# Patient Record
Sex: FEMALE | Race: WHITE | Employment: FULL TIME | URBAN - METROPOLITAN AREA
[De-identification: names, ages, dates, MRNs, and addresses within clinical notes are randomized per-mention and may not be internally consistent; named-entity substitution may affect disease eponyms.]

---

## 2017-06-27 PROBLEM — E78.2 MIXED HYPERLIPIDEMIA: Status: ACTIVE | Noted: 2017-06-27

## 2017-06-27 PROBLEM — G43.001 MIGRAINE WITHOUT AURA AND WITH STATUS MIGRAINOSUS, NOT INTRACTABLE: Status: ACTIVE | Noted: 2017-06-27

## 2017-06-27 PROBLEM — M19.90 ARTHRITIS: Status: ACTIVE | Noted: 2017-06-27

## 2017-06-27 PROBLEM — E03.9 ACQUIRED HYPOTHYROIDISM: Status: ACTIVE | Noted: 2017-06-27

## 2017-06-27 PROBLEM — M79.89 SWELLING OF BOTH HANDS: Status: ACTIVE | Noted: 2017-06-27

## 2017-07-06 ENCOUNTER — HOSPITAL ENCOUNTER (OUTPATIENT)
Dept: GENERAL RADIOLOGY | Age: 52
Discharge: HOME | End: 2017-07-06
Payer: COMMERCIAL

## 2017-07-06 DIAGNOSIS — M79.89 SWELLING OF BOTH HANDS: ICD-10-CM

## 2017-07-06 DIAGNOSIS — M19.90 ARTHRITIS: ICD-10-CM

## 2017-07-06 PROCEDURE — 73120 X-RAY EXAM OF HAND: CPT

## 2017-07-06 NOTE — PROGRESS NOTES
Please let her know that her XR does not show any erosions, just some minor changes from osteoarthritis. At this point we can say this is not rheumatoid or autoimmune arthritis we are dealing with. I would like her to get back with me so we can discuss ways to treat this for her. Thanks.

## 2017-09-14 PROBLEM — D17.1 LIPOMA OF TORSO: Status: ACTIVE | Noted: 2017-09-14

## 2017-09-14 PROBLEM — M19.041 OSTEOARTHRITIS OF BOTH HANDS: Status: ACTIVE | Noted: 2017-06-27

## 2017-09-14 PROBLEM — M19.042 OSTEOARTHRITIS OF BOTH HANDS: Status: ACTIVE | Noted: 2017-06-27

## 2017-11-15 ENCOUNTER — HOSPITAL ENCOUNTER (OUTPATIENT)
Dept: SURGERY | Age: 52
Discharge: HOME | End: 2017-11-15

## 2017-11-15 NOTE — PERIOP NOTES
Patient did not show for 0815 PAT appointment. Message left with Dr. Giselle Whittaker Showers nurse, informing that patient did not show for appointment and will need to be rescheduled.

## 2018-01-02 PROBLEM — F33.41 MDD (MAJOR DEPRESSIVE DISORDER), RECURRENT, IN PARTIAL REMISSION (HCC): Status: ACTIVE | Noted: 2018-01-02

## 2018-01-02 PROBLEM — Z23 ENCOUNTER FOR IMMUNIZATION: Status: ACTIVE | Noted: 2018-01-02

## 2018-01-02 PROBLEM — R68.84 JAW PAIN: Status: ACTIVE | Noted: 2018-01-02

## 2018-01-02 PROBLEM — K21.9 GASTROESOPHAGEAL REFLUX DISEASE WITHOUT ESOPHAGITIS: Status: ACTIVE | Noted: 2018-01-02

## 2018-01-02 PROBLEM — F17.200 TOBACCO DEPENDENCE: Status: ACTIVE | Noted: 2018-01-02

## 2018-01-03 ENCOUNTER — HOSPITAL ENCOUNTER (OUTPATIENT)
Dept: SURGERY | Age: 53
Discharge: HOME | End: 2018-01-03
Attending: SURGERY

## 2018-01-03 VITALS
SYSTOLIC BLOOD PRESSURE: 117 MMHG | HEART RATE: 72 BPM | BODY MASS INDEX: 19.15 KG/M2 | TEMPERATURE: 97.2 F | RESPIRATION RATE: 18 BRPM | HEIGHT: 67 IN | DIASTOLIC BLOOD PRESSURE: 72 MMHG | OXYGEN SATURATION: 100 % | WEIGHT: 122 LBS

## 2018-01-03 RX ORDER — BISMUTH SUBSALICYLATE 262 MG
1 TABLET,CHEWABLE ORAL DAILY
COMMUNITY
End: 2018-04-18

## 2018-01-03 RX ORDER — ZINC GLUCONATE 10 MG
LOZENGE ORAL 2 TIMES DAILY
COMMUNITY
End: 2018-04-18

## 2018-01-03 RX ORDER — LANOLIN ALCOHOL/MO/W.PET/CERES
1000 CREAM (GRAM) TOPICAL DAILY
COMMUNITY
End: 2018-04-18

## 2018-01-03 RX ORDER — IBUPROFEN 200 MG
TABLET ORAL
COMMUNITY
End: 2018-04-18

## 2018-01-03 NOTE — PERIOP NOTES
Patient verified name, , and surgery as listed in [facility]. Type 2 surgery, PAT walk-in assessment complete. Labs per surgeon: No orders received. Orders will be put in [facility] by surgeon. Labs per anesthesia protocol: None per anesthesia protocol and none per Dr. Mario April protocol. EKG: None per anesthesia protocol. Patient states hx of heart murmur. Per Dr. Michelle Bruno note (3/23/17) \"Murmurs: no. Rhythm: regular. Heart sounds: normal, no gallops. \" Per Dr. Saintclair Deep note (17) \"Cardiovascular: Normal rate, regular rhythm and normal heart sounds. Exam reveals no gallop and no friction rub. No murmur heard. \"  No murmur auscultated at PAT appointment by this nurse or by Chivo Edge RN. Patient states last echo was over 10 years ago. Hibiclens and instructions given per hospital policy. Patient provided with and instructed on educational handouts including Guide to Surgery, Pain Management, Hand Hygiene, Blood Transfusion Education, and [facility] Brochure. Patient answered medical/surgical history questions at their best of ability. All prior to admission medications documented in [facility]. Original medication prescription bottle NOT visualized during patient appointment. Patient instructed to hold all vitamins 7 days prior to surgery and NSAIDS 5 days prior to surgery, patient verbalized understanding. Medications to be held: All vitamins/supplements, Excedrin, and Ibuprofen. Patient instructed to continue previous medications as prescribed prior to surgery and to take the following medications the day of surgery according to anesthesia guidelines with a small sip of water: Claritin, Nexium, Citalopram, and Levothyroxine. Patient teach back successful and patient demonstrates knowledge of instructions.

## 2018-01-08 ENCOUNTER — ANESTHESIA EVENT (OUTPATIENT)
Dept: SURGERY | Age: 53
End: 2018-01-08
Payer: COMMERCIAL

## 2018-01-09 ENCOUNTER — HOSPITAL ENCOUNTER (OUTPATIENT)
Age: 53
Setting detail: OUTPATIENT SURGERY
Discharge: HOME | End: 2018-01-09
Attending: SURGERY | Admitting: SURGERY
Payer: COMMERCIAL

## 2018-01-09 ENCOUNTER — ANESTHESIA (OUTPATIENT)
Dept: SURGERY | Age: 53
End: 2018-01-09
Payer: COMMERCIAL

## 2018-01-09 VITALS
HEART RATE: 92 BPM | BODY MASS INDEX: 19.15 KG/M2 | WEIGHT: 122 LBS | RESPIRATION RATE: 16 BRPM | SYSTOLIC BLOOD PRESSURE: 105 MMHG | TEMPERATURE: 97.9 F | HEIGHT: 67 IN | DIASTOLIC BLOOD PRESSURE: 54 MMHG | OXYGEN SATURATION: 93 %

## 2018-01-09 DIAGNOSIS — D17.1 LIPOMA OF TORSO: Primary | ICD-10-CM

## 2018-01-09 PROCEDURE — 76210000017 HC OR PH I REC 1.5 TO 2 HR: Performed by: SURGERY

## 2018-01-09 PROCEDURE — 77030008522 HC TBNG INSUF LAPRO STRY -B: Performed by: SURGERY

## 2018-01-09 PROCEDURE — 88312 SPECIAL STAINS GROUP 1: CPT | Performed by: SURGERY

## 2018-01-09 PROCEDURE — 77030021678 HC GLIDESCP STAT DISP VERT -B: Performed by: ANESTHESIOLOGY

## 2018-01-09 PROCEDURE — 77030031139 HC SUT VCRL2 J&J -A: Performed by: SURGERY

## 2018-01-09 PROCEDURE — 77030008477 HC STYL SATN SLP COVD -A: Performed by: ANESTHESIOLOGY

## 2018-01-09 PROCEDURE — 77030034154 HC SHR COAG HARM ACE J&J -F: Performed by: SURGERY

## 2018-01-09 PROCEDURE — 74011000250 HC RX REV CODE- 250

## 2018-01-09 PROCEDURE — 77030008703 HC TU ET UNCUF COVD -A: Performed by: ANESTHESIOLOGY

## 2018-01-09 PROCEDURE — 88307 TISSUE EXAM BY PATHOLOGIST: CPT | Performed by: SURGERY

## 2018-01-09 PROCEDURE — 77030018836 HC SOL IRR NACL ICUM -A: Performed by: SURGERY

## 2018-01-09 PROCEDURE — 77030011640 HC PAD GRND REM COVD -A: Performed by: SURGERY

## 2018-01-09 PROCEDURE — 74011250636 HC RX REV CODE- 250/636: Performed by: ANESTHESIOLOGY

## 2018-01-09 PROCEDURE — 76010000149 HC OR TIME 1 TO 1.5 HR: Performed by: SURGERY

## 2018-01-09 PROCEDURE — 77030003029 HC SUT VCRL J&J -B: Performed by: SURGERY

## 2018-01-09 PROCEDURE — 77030020782 HC GWN BAIR PAWS FLX 3M -B: Performed by: ANESTHESIOLOGY

## 2018-01-09 PROCEDURE — 77030002916 HC SUT ETHLN J&J -A: Performed by: SURGERY

## 2018-01-09 PROCEDURE — 76060000033 HC ANESTHESIA 1 TO 1.5 HR: Performed by: SURGERY

## 2018-01-09 PROCEDURE — 88304 TISSUE EXAM BY PATHOLOGIST: CPT | Performed by: SURGERY

## 2018-01-09 PROCEDURE — 77030032490 HC SLV COMPR SCD KNE COVD -B: Performed by: SURGERY

## 2018-01-09 PROCEDURE — 77030002982 HC SUT POLYSRB J&J -A: Performed by: SURGERY

## 2018-01-09 PROCEDURE — 77030037892: Performed by: SURGERY

## 2018-01-09 PROCEDURE — 88313 SPECIAL STAINS GROUP 2: CPT | Performed by: SURGERY

## 2018-01-09 PROCEDURE — 77030010513 HC APPL CLP LIG J&J -C: Performed by: SURGERY

## 2018-01-09 PROCEDURE — 74011250636 HC RX REV CODE- 250/636

## 2018-01-09 PROCEDURE — 74011000250 HC RX REV CODE- 250: Performed by: SURGERY

## 2018-01-09 RX ORDER — BUPIVACAINE HYDROCHLORIDE 2.5 MG/ML
INJECTION, SOLUTION EPIDURAL; INFILTRATION; INTRACAUDAL AS NEEDED
Status: DISCONTINUED | OUTPATIENT
Start: 2018-01-09 | End: 2018-01-09 | Stop reason: HOSPADM

## 2018-01-09 RX ORDER — DEXAMETHASONE SODIUM PHOSPHATE 4 MG/ML
INJECTION, SOLUTION INTRA-ARTICULAR; INTRALESIONAL; INTRAMUSCULAR; INTRAVENOUS; SOFT TISSUE AS NEEDED
Status: DISCONTINUED | OUTPATIENT
Start: 2018-01-09 | End: 2018-01-09 | Stop reason: HOSPADM

## 2018-01-09 RX ORDER — GLYCOPYRROLATE 0.2 MG/ML
INJECTION INTRAMUSCULAR; INTRAVENOUS AS NEEDED
Status: DISCONTINUED | OUTPATIENT
Start: 2018-01-09 | End: 2018-01-09 | Stop reason: HOSPADM

## 2018-01-09 RX ORDER — ONDANSETRON 2 MG/ML
INJECTION INTRAMUSCULAR; INTRAVENOUS AS NEEDED
Status: DISCONTINUED | OUTPATIENT
Start: 2018-01-09 | End: 2018-01-09 | Stop reason: HOSPADM

## 2018-01-09 RX ORDER — SODIUM CHLORIDE 0.9 % (FLUSH) 0.9 %
5-10 SYRINGE (ML) INJECTION AS NEEDED
Status: DISCONTINUED | OUTPATIENT
Start: 2018-01-09 | End: 2018-01-09 | Stop reason: HOSPADM

## 2018-01-09 RX ORDER — MIDAZOLAM HYDROCHLORIDE 1 MG/ML
2 INJECTION, SOLUTION INTRAMUSCULAR; INTRAVENOUS
Status: DISCONTINUED | OUTPATIENT
Start: 2018-01-09 | End: 2018-01-09 | Stop reason: HOSPADM

## 2018-01-09 RX ORDER — LIDOCAINE HYDROCHLORIDE 20 MG/ML
INJECTION, SOLUTION EPIDURAL; INFILTRATION; INTRACAUDAL; PERINEURAL AS NEEDED
Status: DISCONTINUED | OUTPATIENT
Start: 2018-01-09 | End: 2018-01-09 | Stop reason: HOSPADM

## 2018-01-09 RX ORDER — PROPOFOL 10 MG/ML
INJECTION, EMULSION INTRAVENOUS AS NEEDED
Status: DISCONTINUED | OUTPATIENT
Start: 2018-01-09 | End: 2018-01-09 | Stop reason: HOSPADM

## 2018-01-09 RX ORDER — SODIUM CHLORIDE 9 MG/ML
25 INJECTION, SOLUTION INTRAVENOUS CONTINUOUS
Status: DISCONTINUED | OUTPATIENT
Start: 2018-01-09 | End: 2018-01-09 | Stop reason: HOSPADM

## 2018-01-09 RX ORDER — HYDROCODONE BITARTRATE AND ACETAMINOPHEN 5; 325 MG/1; MG/1
TABLET ORAL
Qty: 40 TAB | Refills: 0 | Status: SHIPPED
Start: 2018-01-09 | End: 2018-04-18

## 2018-01-09 RX ORDER — FAMOTIDINE 20 MG/1
20 TABLET, FILM COATED ORAL ONCE
Status: DISCONTINUED | OUTPATIENT
Start: 2018-01-09 | End: 2018-01-09 | Stop reason: HOSPADM

## 2018-01-09 RX ORDER — NEOSTIGMINE METHYLSULFATE 1 MG/ML
INJECTION INTRAVENOUS AS NEEDED
Status: DISCONTINUED | OUTPATIENT
Start: 2018-01-09 | End: 2018-01-09 | Stop reason: HOSPADM

## 2018-01-09 RX ORDER — SODIUM CHLORIDE, SODIUM LACTATE, POTASSIUM CHLORIDE, CALCIUM CHLORIDE 600; 310; 30; 20 MG/100ML; MG/100ML; MG/100ML; MG/100ML
100 INJECTION, SOLUTION INTRAVENOUS CONTINUOUS
Status: DISCONTINUED | OUTPATIENT
Start: 2018-01-09 | End: 2018-01-09 | Stop reason: HOSPADM

## 2018-01-09 RX ORDER — OXYCODONE HYDROCHLORIDE 5 MG/1
5 TABLET ORAL
Status: DISCONTINUED | OUTPATIENT
Start: 2018-01-09 | End: 2018-01-09 | Stop reason: HOSPADM

## 2018-01-09 RX ORDER — ROCURONIUM BROMIDE 10 MG/ML
INJECTION, SOLUTION INTRAVENOUS AS NEEDED
Status: DISCONTINUED | OUTPATIENT
Start: 2018-01-09 | End: 2018-01-09 | Stop reason: HOSPADM

## 2018-01-09 RX ORDER — NALOXONE HYDROCHLORIDE 0.4 MG/ML
0.1 INJECTION, SOLUTION INTRAMUSCULAR; INTRAVENOUS; SUBCUTANEOUS AS NEEDED
Status: DISCONTINUED | OUTPATIENT
Start: 2018-01-09 | End: 2018-01-09 | Stop reason: HOSPADM

## 2018-01-09 RX ORDER — HYDROMORPHONE HYDROCHLORIDE 2 MG/ML
0.5 INJECTION, SOLUTION INTRAMUSCULAR; INTRAVENOUS; SUBCUTANEOUS
Status: COMPLETED | OUTPATIENT
Start: 2018-01-09 | End: 2018-01-09

## 2018-01-09 RX ORDER — HYDROCODONE BITARTRATE AND ACETAMINOPHEN 7.5; 325 MG/1; MG/1
1 TABLET ORAL AS NEEDED
Status: DISCONTINUED | OUTPATIENT
Start: 2018-01-09 | End: 2018-01-09 | Stop reason: HOSPADM

## 2018-01-09 RX ORDER — ONDANSETRON 8 MG/1
8 TABLET, ORALLY DISINTEGRATING ORAL
Qty: 10 TAB | Refills: 1 | Status: SHIPPED
Start: 2018-01-09 | End: 2018-04-18

## 2018-01-09 RX ORDER — FENTANYL CITRATE 50 UG/ML
100 INJECTION, SOLUTION INTRAMUSCULAR; INTRAVENOUS ONCE
Status: DISCONTINUED | OUTPATIENT
Start: 2018-01-09 | End: 2018-01-09 | Stop reason: HOSPADM

## 2018-01-09 RX ORDER — SODIUM CHLORIDE 0.9 % (FLUSH) 0.9 %
5-10 SYRINGE (ML) INJECTION EVERY 8 HOURS
Status: DISCONTINUED | OUTPATIENT
Start: 2018-01-09 | End: 2018-01-09 | Stop reason: HOSPADM

## 2018-01-09 RX ORDER — LIDOCAINE HYDROCHLORIDE 10 MG/ML
0.1 INJECTION INFILTRATION; PERINEURAL AS NEEDED
Status: DISCONTINUED | OUTPATIENT
Start: 2018-01-09 | End: 2018-01-09 | Stop reason: HOSPADM

## 2018-01-09 RX ORDER — ACETAMINOPHEN 10 MG/ML
1000 INJECTION, SOLUTION INTRAVENOUS ONCE
Status: COMPLETED | OUTPATIENT
Start: 2018-01-09 | End: 2018-01-09

## 2018-01-09 RX ADMIN — HYDROMORPHONE HYDROCHLORIDE 0.5 MG: 2 INJECTION, SOLUTION INTRAMUSCULAR; INTRAVENOUS; SUBCUTANEOUS at 17:42

## 2018-01-09 RX ADMIN — MIDAZOLAM HYDROCHLORIDE 2 MG: 1 INJECTION, SOLUTION INTRAMUSCULAR; INTRAVENOUS at 15:48

## 2018-01-09 RX ADMIN — GLYCOPYRROLATE 0.2 MG: 0.2 INJECTION INTRAMUSCULAR; INTRAVENOUS at 17:03

## 2018-01-09 RX ADMIN — NEOSTIGMINE METHYLSULFATE 2 MG: 1 INJECTION INTRAVENOUS at 17:03

## 2018-01-09 RX ADMIN — ROCURONIUM BROMIDE 35 MG: 10 INJECTION, SOLUTION INTRAVENOUS at 16:07

## 2018-01-09 RX ADMIN — DEXAMETHASONE SODIUM PHOSPHATE 5 MG: 4 INJECTION, SOLUTION INTRA-ARTICULAR; INTRALESIONAL; INTRAMUSCULAR; INTRAVENOUS; SOFT TISSUE at 16:16

## 2018-01-09 RX ADMIN — HYDROMORPHONE HYDROCHLORIDE 0.5 MG: 2 INJECTION, SOLUTION INTRAMUSCULAR; INTRAVENOUS; SUBCUTANEOUS at 17:47

## 2018-01-09 RX ADMIN — PROPOFOL 200 MG: 10 INJECTION, EMULSION INTRAVENOUS at 16:07

## 2018-01-09 RX ADMIN — SODIUM CHLORIDE, SODIUM LACTATE, POTASSIUM CHLORIDE, AND CALCIUM CHLORIDE: 600; 310; 30; 20 INJECTION, SOLUTION INTRAVENOUS at 17:02

## 2018-01-09 RX ADMIN — ONDANSETRON 4 MG: 2 INJECTION INTRAMUSCULAR; INTRAVENOUS at 16:16

## 2018-01-09 RX ADMIN — HYDROMORPHONE HYDROCHLORIDE 0.5 MG: 2 INJECTION, SOLUTION INTRAMUSCULAR; INTRAVENOUS; SUBCUTANEOUS at 17:25

## 2018-01-09 RX ADMIN — SODIUM CHLORIDE, SODIUM LACTATE, POTASSIUM CHLORIDE, AND CALCIUM CHLORIDE 100 ML/HR: 600; 310; 30; 20 INJECTION, SOLUTION INTRAVENOUS at 14:47

## 2018-01-09 RX ADMIN — HYDROMORPHONE HYDROCHLORIDE 0.5 MG: 2 INJECTION, SOLUTION INTRAMUSCULAR; INTRAVENOUS; SUBCUTANEOUS at 17:30

## 2018-01-09 RX ADMIN — LIDOCAINE HYDROCHLORIDE 100 MG: 20 INJECTION, SOLUTION EPIDURAL; INFILTRATION; INTRACAUDAL; PERINEURAL at 16:07

## 2018-01-09 RX ADMIN — ACETAMINOPHEN 1000 MG: 10 INJECTION, SOLUTION INTRAVENOUS at 18:15

## 2018-01-09 NOTE — OP NOTES
Operative Report    Patient: Vahe Berry MRN: [id number]      YOB: 1965  Age: 45 y.o. Sex: female       Date of Surgery: 1/9/2018     Preoperative Diagnosis: Biliary dyskinesia [K82.8]  Lipoma of back [D17.1]     Postoperative Diagnosis: Biliary dyskinesia [K82.8]  Lipoma of back [D17.1],  Non-specific liver abnormality     Procedure:  Laparoscopic Cholecystectomy -Single Incision/SILS  Laparoscopic wedge liver biopsy  Excision subcutaneous mass right flank, 6cm    Anesthesia: General   Complications: none  Estimated Blood Loss: per anesthesia    Indications:  As outlined in the History and Physical.  Single incision technique is planned to provide the patient with the benefit of less incisional pain and improved cosmesis due to fewer incisions as well as the potential for lower risk of wound infection. This technique is significantly more intricate than standard laparoscopic techniques and typically increases the complexity of the procedure by 10-20%. Procedure in Detail:   Informed consent was obtained and the patient was brought to the operating room and placed on the table in supine position with adequate padding of all pressure points and compression devices on both lower extremities. After the successful induction of general anesthesia, the patients abdomen was prepped and draped sterilely. Under direct laparoscopic visualization and additional local anesthetic, a 5mm optiview-type Trocar was inserted through a curved infraumbilical  incision and pneumoperitoneum was created. Visual exploration revealed no immediately apparent pathology other than what appeared to be focal thickening and/or fibrosis of the capsule of the undersurface of the liver lateral to and just medial to the gallbladder. An additional 5mm Trocar was placed through the incision  and a [place] grasper was inserted alongside that trocar sleeve.  The visibly normal gallbladder was grasped by its neck and retracted anterolaterally. The tissues investing this area were incised and blunt dissection was used to skeletonize the normal caliber cystic duct for approximately 10mm. A generous window was created behind the duct allowing identification of the cystic artery, which was traced to the gallbladder wall and divided with the harmonic scalpel. This permitted  generation of a large window behind the neck of the gallbladder allowing a near 360 degree view of the region to confirm normal anatomy of the origin of the cystic duct. A clip was then placed across the neck of the gallbladder and two on the duct 0.5 cm medially and it was transected. The gallbladder was excised with the harmonic. The liver was noted to demonstrate probable capsular fibrosis as noted above, but the plaque-like areas warranted biopsy. The harmonic scalpel was used to excise a roughly 1 cubic cm wedge from the right lateral aspect at one of the areas of thickening. The site was confirmed to be hemostatic with the harmonic scalpel. After confirming hemostasis of the liver bed, the liver biopsy and gallbladder were placed in a pouch which was extracted through the umbilical site. Pneumoperitoneum was released and the trocars were removed. The fascia was closed with a figure-of-eight 0-Vicryl,  then the incision was  made hemostatic with cautery and closed with subcuticular 4-0 Vicryl and Steri-Strips were applied. The right flank subcutaneous mass was then excised using blunt and cautery dissection. This revealed a 6cm transverse lipomatous mass. The area was infiltrated with local and made hemostatic with cautery. It was closed with layered 4-0 interrupted vicryl. Steri strips were placed. The patient tolerated the procedure well. There were no immediate apparent complications. She was awakened from anesthesia and extubated in the operating room, taken to recovery in satisfactory condition.            Specimens:   ID Type Source Tests Collected by Time Destination   1 : Gallbladder and contents Preservative Gallbladder  Jennifer Alonzo MD 1/9/2018 1628 Pathology   3 : liver biopsy Preservative   Jennifer Alonzo MD 1/9/2018 1639 Pathology   1 : right flank lipoma Tissue   Jennifer Alonzo MD 1/9/2018 1703 Discarded           Counts: Sponge and needle counts were correct.     Signed By:  Jennifer Alonzo MD     January 9, 2018

## 2018-01-09 NOTE — ANESTHESIA PREPROCEDURE EVALUATION
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          Physical Exam    Airway  Mallampati: III  TM Distance: 4 - 6 cm  Neck ROM: normal range of motion   Mouth opening: Normal     Cardiovascular  Regular rate and rhythm,  S1 and S2 normal,  no murmur, click, rub, or gallop             Dental  No notable dental hx       Pulmonary                 Abdominal  GI exam deferred       Other Findings            Anesthetic Plan    ASA: 2  Anesthesia type: general          Induction: Intravenous  Anesthetic plan and risks discussed with: Patient

## 2018-01-09 NOTE — DISCHARGE INSTRUCTIONS
Jaimie Mendoza M.D.  [phone]    Instructions following Laparoscopic Cholecystectomy:    ACTIVITY:   Try to take a few short walks with help around the house later today. It is very important to take short walks to avoid blood clots and pneumonia.  You may be light-headed or sleepy from anesthesia, so be careful going up and down stairs.  Avoid any activity that may be dangerous or involves heavy objects for 24-48 hours. DIET:   Drink only clear, non-carbonated liquids when you get home (sugar-free if you are diabetic), such as Gatorade, chicken broth, etc.   Tomorrow start soft foods such as grits and eggs, mashed potatoes, yogurt, cottage cheese, etc. Remain on soft foods for at least 24-36 hours then you may eat whatever foods you wish.  Many people experience nausea for a day or so after surgery, and many people have loose stools or diarrhea immediately after gallbladder surgery. It is also not uncommon to not have a bowel movement for 2-3 days. PAIN:   You will be given a prescription for pain medication and nausea.  Try to take the pain medication with food, even a few crackers.  You may also use Tylenol, Motrin, Advil, or Aleve instead of the prescription pain medication. Do no take Tylenol and the prescription pain medication within 4 hours of each other.  URINARY RETENTION: If you are unable to empty your bladder within 6 hours after returning home, please go to your nearest Emergency Department or Urgent Care for urinary catheterization. WOUND CARE:   You may shower the day after surgery   It is not uncommon for the incisions to ooze or drain blood-tinged fluid. Cover the area with gauze if the drainage continues.  Incisions will sometimes develop redness around them, up to the size of a quarter, as well as a hard lumpy feel. If this redness continues to get larger, please call the office.     PLEASE NOTE: it is not uncommon to have pain and a \"knot\" to the left of the belly button. CALL THE DOCTOR IF:   You have a temperature higher than 101.5° Fahrenheit for more than 6 hours.  You have severe nausea or vomiting not relieved by medication; or diarrhea.  You develop increasing redness or infection at the incision. Continue home medications as previously prescribed.

## 2018-01-09 NOTE — ANESTHESIA POSTPROCEDURE EVALUATION
Post-Anesthesia Evaluation and Assessment    Patient: Julio Saavedra MRN: [id number]  SSN: [id number]    YOB: 1965  Age: 45 y.o. Sex: female       Cardiovascular Function/Vital Signs  Visit Vitals    /54    Pulse 92    Temp 36.6 °C (97.9 °F)    Resp 16    Ht 5' 7\" (1.702 m)    Wt 55.3 kg (122 lb)    SpO2 93%    BMI 19.11 kg/m2       Patient is status post general anesthesia for Procedure(s):  CHOLECYSTECTOMY LAPAROSCOPIC SINGLE INCISION (SILS)  EXCISION OF RIGHT BACK LIPOMA  LIVER BIOPSY. Nausea/Vomiting: None    Postoperative hydration reviewed and adequate. Pain:  Pain Scale 1: Visual (01/09/18 1800)  Pain Intensity 1: 3 (01/09/18 1800)   Managed    Neurological Status:   Neuro (WDL): Exceptions to WDL (01/09/18 1800)  Neuro  Neurologic State: Drowsy (01/09/18 1800)  Cognition: Follows commands (01/09/18 1800)  LUE Motor Response: Purposeful (01/09/18 1800)  LLE Motor Response: Purposeful (01/09/18 1800)  RUE Motor Response: Purposeful (01/09/18 1800)  RLE Motor Response: Purposeful (01/09/18 1800)   At baseline    Mental Status and Level of Consciousness: Arousable    Pulmonary Status:   O2 Device: Room air (01/09/18 1815)   Adequate oxygenation and airway patent    Complications related to anesthesia: None    Post-anesthesia assessment completed.  No concerns    Signed By: Mark Borges MD     January 9, 2018

## 2018-01-09 NOTE — H&P
Primary/Requesting provider: Cassy Avila MD          Chief Complaint   Patient presents with    Skin Problem       lipoma                  HISTORY OF PRESENT ILLNESS  Kirsten Hines is a 45 y.o. female. HPI  Patient is a 45 y.o. female who presents for consideration of mass removal.  She has a recently enlarging subcutaneous mass of her right flank which has been present for at least 10 years. With recent enlargement she is experiencing occasional pains at the site but also pain when she lays on her right side- \"feels like I'm laying on a Lego piece. \"  She has not had any erythema or bruising at the site. The mass remains mobile.     She then asked if she could discuss her gallbladder. She has had symptoms for at least 10 years including tightness in a band-like distribution across her upper abdomen with nausea and bloating which can last upwards of 36 hours. This seems to be post-prandial and she mentions fried foods and avacados as examples of foods she is trying to avoid. Even just eating bland soup/sandwich at lunch she typically has some degree of symptoms at least once a week. She was evaluated many years ago- recalls HIDA- but never had surgery. She does not report vomiting, fever, chills, jaundice. She does have occasional diarrheal stool but cannot confidently tie it to the other symptoms or to any specific food trigger. She believes her HIDA result was 25% [facility]) and she distinctly remembers having significant pain reproduction with the injection. She has reached a point of intolerance to her symptoms.     Medications:        Current Outpatient Prescriptions   Medication Sig    atorvastatin (LIPITOR) 10 mg tablet Take 1 Tab by mouth daily.  rizatriptan (MAXALT) 5 mg tablet Take 1 Tab by mouth once as needed for Migraine for up to 1 dose.  levothyroxine (SYNTHROID) 50 mcg tablet TAKE 1 TABLET (50 MCG) BY MOUTH DAILY.     butalbital-acetaminophen-caff (FIORICET) -40 mg per capsule Take 1 Cap by mouth every four (4) hours as needed for Pain. Max Daily Amount: 6 Caps.  citalopram (CELEXA) 20 mg tablet Take 1 Tab by mouth daily.  estradiol (ESTRACE) 2 mg tablet Take 1 Tab by mouth daily.  medroxyPROGESTERone (PROVERA) 5 mg tablet Take 1 Tab by mouth daily.  ondansetron hcl (ZOFRAN) 4 mg tablet Take 4 mg by mouth.  oxyCODONE (OXYIR) 5 mg capsule Take 1 Cap by mouth every four (4) hours as needed. Max Daily Amount: 30 mg.    esomeprazole (NEXIUM) 20 mg capsule Take 40 mg by mouth daily.  loratadine (CLARITIN) 10 mg tablet Take 10 mg by mouth daily.      No current facility-administered medications for this visit.          Allergies: Allergies   Allergen Reactions    Uribel [Methen-M. Blue-S. Phos-Phsal-Hyo] Anaphylaxis    Bactrim [Sulfamethoprim Ds] Unknown (comments)    Diflucan [Fluconazole] Unknown (comments)    Penicillins Unknown (comments)    Zyrtec [Cetirizine] Other (comments)       Stomach cramps vaginal bleeding         Past History:       Past Medical History:   Diagnosis Date    Depression      GERD (gastroesophageal reflux disease)      History of chicken pox      History of migraine headaches      History of TMJ disorder      Hypercholesterolemia      Measles      Mumps      Thyroid disease              Past Surgical History:   Procedure Laterality Date    HX BREAST AUGMENTATION        HX COLONOSCOPY   2016    HX GYN         endo ablation and essure tubal    HX GYN   1989 dysplasia     2 cone biopsy           Family and Social History:        Family History   Problem Relation Age of Onset    Hypertension Mother      Hypertension Maternal Grandmother      Ovarian Cancer Neg Hx      Breast Cancer Neg Hx      Colon Cancer Neg Hx        Social History            Social History    Marital status:        Spouse name: N/A    Number of children: N/A    Years of education: N/A          Occupational History    Not on file.     Social History Main Topics    Smoking status: Former Smoker    Smokeless tobacco: Never Used    Alcohol use No    Drug use: No    Sexual activity: Yes       Partners: Male       Birth control/ protection: Surgical           Other Topics Concern    Not on file      Social History Narrative       Review of Systems   Respiratory: Negative for cough, hemoptysis, sputum production and shortness of breath. Cardiovascular: Negative. Negative for chest pain, palpitations, orthopnea, claudication, leg swelling and PND. Gastrointestinal: Negative for abdominal pain, blood in stool, constipation, diarrhea, heartburn, nausea and vomiting. Skin:        Lipoma on the torso of the right side. Has been present for 10-12 years per patient. It has grown in size and is becoming unconformable. Neurological: Negative for headaches. Psychiatric/Behavioral: Negative. Negative for depression, hallucinations, substance abuse and suicidal ideas. The patient is not nervous/anxious.          Physical Exam   Constitutional: She appears well-developed and well-nourished. She is cooperative. Non-toxic appearance. HENT:   Head: Normocephalic and atraumatic. Mouth/Throat: Oropharynx is clear and moist.   Eyes: Conjunctivae and EOM are normal. Pupils are equal, round, and reactive to light. No scleral icterus. Neck: Normal range of motion. No JVD present. No tracheal deviation present. No thyromegaly present. Cardiovascular: Normal rate and regular rhythm. Exam reveals no gallop and no friction rub. No murmur heard. Pulmonary/Chest: Effort normal and breath sounds normal. No respiratory distress. She has no wheezes. She has no rales. Abdominal: Soft. Bowel sounds are normal. She exhibits no distension. There is tenderness (RUQ>epigastrum. increased with inspiration, but no true Mead's sign). There is guarding. There is no rebound. Musculoskeletal:   No gross deformities   Neurological: She is alert.  No cranial nerve deficit. Skin: Skin is warm. She is not diaphoretic. 5+cm lipomatous mass right flank between costal margin and iliac crest   Psychiatric: She has a normal mood and affect. Her behavior is normal. Judgment and thought content normal.   Vitals reviewed.        ASSESSMENT and PLAN       Encounter Diagnoses   Name Primary?  Lipoma of flank Yes    Biliary dyskinesia        GI symptoms are certainly suggestive of a gallbladder etiology, and her HIDA is reported to be abnormal-(25.6% in 2008 per Dr Marco Mcdowell office note at [facility] in 2016)  I think cholecystectomy is reasonable.     Will proceed with cholecystectomy and removal of the right flank 5+cm mass. Technical details of the procedure are reviewed, including the non-standard single incision (SILS) technique if indicated. Risks reviewed include anesthetic risks, bleeding, infection, visceral injury including bile leak, incomplete symptom resolution and persistent post-operative diarrhea as well as conversion to open technique. All questions are answered.

## 2018-01-09 NOTE — IP AVS SNAPSHOT
Jordynthierry Oliver 
 
 
 [address] [address] 
[phone] Patient: Tye Rodrigues 
MRN: [id number] :1965 About your hospitalization You were admitted on:  2018 You last received care in the:  [facility] PACU You were discharged on:  2018 Why you were hospitalized Your primary diagnosis was:  Not on File Follow-up Information Follow up With Details Comments Contact Info Gurdeep Serra MD   [address] [address] 
[phone] Jocelin Kwong MD  as directed [address] [address] 
[phone] Discharge Orders None A check krishan indicates which time of day the medication should be taken. My Medications START taking these medications Instructions Each Dose to Equal  
 Morning Noon Evening Bedtime HYDROcodone-acetaminophen 5-325 mg per tablet Commonly known as:  Jase Rodriguez Your last dose was: Your next dose is: Take 1-2 tabs po Q4-6hrs prn pain  
     
   
   
   
  
 ondansetron 8 mg disintegrating tablet Commonly known as:  ZOFRAN ODT Your last dose was: Your next dose is: Take 1 Tab by mouth every eight (8) hours as needed for Nausea. 8 mg CHANGE how you take these medications Instructions Each Dose to Equal  
 Morning Noon Evening Bedtime  
 atorvastatin 10 mg tablet Commonly known as:  LIPITOR What changed:  when to take this Your last dose was: Your next dose is: Take 1 Tab by mouth daily. 10 mg  
    
   
   
   
  
 cyclobenzaprine 10 mg tablet Commonly known as:  FLEXERIL What changed:  when to take this Your last dose was: Your next dose is: Take 1 Tab by mouth three (3) times daily as needed for Muscle Spasm(s). 10 mg  
    
   
   
   
  
 estradiol 2 mg tablet Commonly known as:  ESTRACE What changed:  when to take this Your last dose was: Your next dose is: Take 1 Tab by mouth daily. 2 mg  
    
   
   
   
  
 medroxyPROGESTERone 5 mg tablet Commonly known as:  PROVERA What changed:  when to take this Your last dose was: Your next dose is: Take 1 Tab by mouth daily. 5 mg CONTINUE taking these medications Instructions Each Dose to Equal  
 Morning Noon Evening Bedtime  
 citalopram 20 mg tablet Commonly known as:  Clinton Mac Your last dose was: Your next dose is: Take 1 Tab by mouth daily. 20 mg CLARITIN 10 mg tablet Generic drug:  loratadine Your last dose was: Your next dose is: Take 10 mg by mouth daily. 10 mg  
    
   
   
   
  
 cyanocobalamin 1,000 mcg tablet Your last dose was: Your next dose is: Take 1,000 mcg by mouth daily. 1000 mcg  
    
   
   
   
  
 esomeprazole 40 mg capsule Commonly known as:  NexIUM Your last dose was: Your next dose is: Take 1 Cap by mouth daily. 40 mg  
    
   
   
   
  
 EXCEDRIN EXTRA STRENGTH PO Your last dose was: Your next dose is: Take  by mouth daily as needed. levothyroxine 50 mcg tablet Commonly known as:  SYNTHROID Your last dose was: Your next dose is: Take 1 Tab by mouth Daily (before breakfast). TAKE 1 TABLET (50 MCG) BY MOUTH DAILY. 50 mcg  
    
   
   
   
  
 magnesium 250 mg Tab Your last dose was: Your next dose is: Take  by mouth two (2) times a day. MOTRIN  mg tablet Generic drug:  ibuprofen Your last dose was: Your next dose is: Take  by mouth every six (6) hours as needed for Pain. multivitamin tablet Commonly known as:  ONE A DAY Your last dose was: Your next dose is: Take 1 Tab by mouth daily. 1 Tab  
    
   
   
   
  
 ondansetron hcl 4 mg tablet Commonly known as:  Ken Burris Your last dose was: Your next dose is: Take 4 mg by mouth. 4 mg  
    
   
   
   
  
 oxyCODONE 5 mg capsule Commonly known as:  OXYIR Your last dose was: Your next dose is: Take 1 Cap by mouth every four (4) hours as needed. Max Daily Amount: 30 mg.  
 5 mg  
    
   
   
   
  
 rizatriptan 5 mg tablet Commonly known as:  Chastity Jimenez Your last dose was: Your next dose is: Take 1-2 Tabs by mouth once as needed for Migraine for up to 1 dose. 5-10 mg Where to Get Your Medications Information on where to get these meds will be given to you by the nurse or doctor. ! Ask your nurse or doctor about these medications HYDROcodone-acetaminophen 5-325 mg per tablet  
 ondansetron 8 mg disintegrating tablet Discharge Instructions Brandon Miner M.D. 
[phone] Instructions following Laparoscopic Cholecystectomy: 
 
ACTIVITY: 
? Try to take a few short walks with help around the house later today. It is very important to take short walks to avoid blood clots and pneumonia. ? You may be light-headed or sleepy from anesthesia, so be careful going up and down stairs. ? Avoid any activity that may be dangerous or involves heavy objects for 24-48 hours. DIET: 
? Drink only clear, non-carbonated liquids when you get home (sugar-free if you are diabetic), such as Gatorade, chicken broth, etc. 
? Tomorrow start soft foods such as grits and eggs, mashed potatoes, yogurt, cottage cheese, etc. Remain on soft foods for at least 24-36 hours then you may eat whatever foods you wish. ? Many people experience nausea for a day or so after surgery, and many people have loose stools or diarrhea immediately after gallbladder surgery. It is also not uncommon to not have a bowel movement for 2-3 days. PAIN: 
? You will be given a prescription for pain medication and nausea. ? Try to take the pain medication with food, even a few crackers. ? You may also use Tylenol, Motrin, Advil, or Aleve instead of the prescription pain medication. Do no take Tylenol and the prescription pain medication within 4 hours of each other. ? URINARY RETENTION: If you are unable to empty your bladder within 6 hours after returning home, please go to your nearest Emergency Department or Urgent Care for urinary catheterization. WOUND CARE: 
? You may shower the day after surgery ? It is not uncommon for the incisions to ooze or drain blood-tinged fluid. Cover the area with gauze if the drainage continues. ? Incisions will sometimes develop redness around them, up to the size of a quarter, as well as a hard lumpy feel. If this redness continues to get larger, please call the office. ? PLEASE NOTE: it is not uncommon to have pain and a \"knot\" to the left of the belly button. CALL THE DOCTOR IF: 
? You have a temperature higher than 101.5° Fahrenheit for more than 6 hours. ? You have severe nausea or vomiting not relieved by medication; or diarrhea. 
? You develop increasing redness or infection at the incision. Continue home medications as previously prescribed. Introducing [facility] HEALTH [facility]! [facility] introduces [employer] patient portal. Now you can access parts of your medical record, email your doctor's office, and request medication refills online. 1. In your internet browser, go to [email or web address] 2. Click on the First Time User? Click Here link in the Sign In box. You will see the New Member Sign Up page. 3. Enter your [email or web address] Access Code exactly as it appears below. You will not need to use this code after youve completed the sign-up process. If you do not sign up before the expiration date, you must request a new code. · [email or web address] Access Code: [email or web address] Expires: 4/2/2018  9:13 AM 
 
4. Enter the last four digits of your Social Security Number (xxxx) and Date of Birth (mm/dd/yyyy) as indicated and click Submit. You will be taken to the next sign-up page. 5. Create a [employer] ID. This will be your [email or web address] login ID and cannot be changed, so think of one that is secure and easy to remember. 6. Create a [employer] password. You can change your password at any time. 7. Enter your Password Reset Question and Answer. This can be used at a later time if you forget your password. 8. Enter your e-mail address. You will receive e-mail notification when new information is available in [address] 9. Click Sign Up. You can now view and download portions of your medical record. 10. Click the Download Summary menu link to download a portable copy of your medical information. If you have questions, please visit the Frequently Asked Questions section of the [email or web address] website. Remember, [email or web address] is NOT to be used for urgent needs. For medical emergencies, dial 911. Now available from your iPhone and Android! Providers Seen During Your Hospitalization Provider Specialty Primary office phone Payton Pete MD General Surgery [phone] Your Primary Care Physician (PCP) Primary Care Physician Office Phone Office Fax Nii Diaz [phone] You are allergic to the following Allergen Reactions Uribel (Methen-M. Blue-S. Phos-Phsal-Hyo) Anaphylaxis Bactrim (Sulfamethoprim Ds) Unknown (comments) Diflucan (Fluconazole) Unknown (comments) Echinacea Swelling Throat swelling Penicillins Unknown (comments) Zyrtec (Cetirizine) Other (comments) Stomach cramps vaginal bleeding Recent Documentation Height Weight BMI OB Status Smoking Status 1.702 m 55.3 kg 19.11 kg/m2 Ablation Current Every Day Smoker Emergency Contacts Name Discharge Info Relation Home Work Mobile Hernan Curiel  Spouse [3] [phone] Patient Belongings The following personal items are in your possession at time of discharge: 
  Dental Appliances: None Please provide this summary of care documentation to your next provider. Signatures-by signing, you are acknowledging that this After Visit Summary has been reviewed with you and you have received a copy. Patient Signature:  ____________________________________________________________ Date:  ____________________________________________________________  
  
[facility] Provider Signature:  ____________________________________________________________ Date:  ____________________________________________________________

## 2018-03-27 PROBLEM — R51.9 UNILATERAL HEADACHE: Status: ACTIVE | Noted: 2018-03-27

## 2018-03-27 PROBLEM — G43.011 INTRACTABLE MIGRAINE WITHOUT AURA AND WITH STATUS MIGRAINOSUS: Status: ACTIVE | Noted: 2017-06-27

## 2018-04-16 ENCOUNTER — HOSPITAL ENCOUNTER (OUTPATIENT)
Dept: MRI IMAGING | Age: 53
Discharge: HOME | End: 2018-04-16
Attending: PSYCHIATRY & NEUROLOGY
Payer: COMMERCIAL

## 2018-04-16 DIAGNOSIS — R51.9 UNILATERAL HEADACHE: ICD-10-CM

## 2018-04-16 DIAGNOSIS — G43.011 INTRACTABLE MIGRAINE WITHOUT AURA AND WITH STATUS MIGRAINOSUS: ICD-10-CM

## 2018-04-16 PROCEDURE — A9577 INJ MULTIHANCE: HCPCS | Performed by: PSYCHIATRY & NEUROLOGY

## 2018-04-16 PROCEDURE — 70553 MRI BRAIN STEM W/O & W/DYE: CPT

## 2018-04-16 PROCEDURE — 74011250636 HC RX REV CODE- 250/636: Performed by: PSYCHIATRY & NEUROLOGY

## 2018-04-16 RX ORDER — SODIUM CHLORIDE 0.9 % (FLUSH) 0.9 %
10 SYRINGE (ML) INJECTION
Status: COMPLETED | OUTPATIENT
Start: 2018-04-16 | End: 2018-04-16

## 2018-04-16 RX ADMIN — Medication 10 ML: at 18:05

## 2018-04-16 RX ADMIN — GADOBENATE DIMEGLUMINE 11 ML: 529 INJECTION, SOLUTION INTRAVENOUS at 18:05

## 2018-04-18 PROBLEM — J41.0 SIMPLE CHRONIC BRONCHITIS (HCC): Status: ACTIVE | Noted: 2018-04-18

## 2018-04-18 PROBLEM — Z12.31 SCREENING MAMMOGRAM, ENCOUNTER FOR: Status: ACTIVE | Noted: 2018-04-18

## 2018-04-18 PROBLEM — R61 NIGHT SWEATS: Status: ACTIVE | Noted: 2018-04-18

## 2018-05-11 ENCOUNTER — HOSPITAL ENCOUNTER (OUTPATIENT)
Dept: MAMMOGRAPHY | Age: 53
Discharge: HOME | End: 2018-05-11
Attending: FAMILY MEDICINE
Payer: COMMERCIAL

## 2018-05-11 DIAGNOSIS — Z12.31 SCREENING MAMMOGRAM, ENCOUNTER FOR: ICD-10-CM

## 2018-05-11 PROCEDURE — 77067 SCR MAMMO BI INCL CAD: CPT

## 2018-08-06 PROBLEM — G43.809 CERVICAL MIGRAINE SYNDROME: Status: ACTIVE | Noted: 2018-08-06

## 2018-10-16 PROBLEM — M54.81 OCCIPITAL NEURALGIA OF LEFT SIDE: Status: ACTIVE | Noted: 2018-10-16

## 2018-10-23 PROBLEM — M79.89 BILATERAL HAND SWELLING: Status: ACTIVE | Noted: 2018-10-23

## 2018-10-25 PROBLEM — J44.1 COPD EXACERBATION (HCC): Status: ACTIVE | Noted: 2018-10-25

## 2018-11-02 ENCOUNTER — APPOINTMENT (OUTPATIENT)
Dept: GENERAL RADIOLOGY | Age: 53
End: 2018-11-02
Attending: EMERGENCY MEDICINE
Payer: COMMERCIAL

## 2018-11-02 ENCOUNTER — HOSPITAL ENCOUNTER (OUTPATIENT)
Age: 53
Setting detail: OBSERVATION
LOS: 1 days | Discharge: HOME | End: 2018-11-03
Attending: EMERGENCY MEDICINE | Admitting: INTERNAL MEDICINE
Payer: COMMERCIAL

## 2018-11-02 DIAGNOSIS — J20.9 ACUTE BRONCHITIS, UNSPECIFIED ORGANISM: Primary | ICD-10-CM

## 2018-11-02 DIAGNOSIS — J44.1 COPD EXACERBATION (HCC): ICD-10-CM

## 2018-11-02 PROBLEM — R06.00 DYSPNEA: Status: ACTIVE | Noted: 2018-11-02

## 2018-11-02 PROBLEM — J18.9 PNEUMONIA OF LEFT LOWER LOBE DUE TO INFECTIOUS ORGANISM: Status: ACTIVE | Noted: 2018-11-02

## 2018-11-02 LAB
ANION GAP SERPL CALC-SCNC: 8 MMOL/L (ref 7–16)
APPEARANCE UR: CLEAR
ATRIAL RATE: 77 BPM
BACTERIA URNS QL MICRO: 0 /HPF
BASOPHILS # BLD: 0 K/UL (ref 0–0.2)
BASOPHILS NFR BLD: 0 % (ref 0–2)
BILIRUB UR QL: NEGATIVE
BNP SERPL-MCNC: 19 PG/ML
BUN SERPL-MCNC: 8 MG/DL (ref 6–23)
CALCIUM SERPL-MCNC: 9.1 MG/DL (ref 8.3–10.4)
CALCULATED P AXIS, ECG09: 83 DEGREES
CALCULATED R AXIS, ECG10: 81 DEGREES
CALCULATED T AXIS, ECG11: 71 DEGREES
CHLORIDE SERPL-SCNC: 103 MMOL/L (ref 98–107)
CO2 SERPL-SCNC: 28 MMOL/L (ref 21–32)
COLOR UR: YELLOW
CREAT SERPL-MCNC: 0.8 MG/DL (ref 0.6–1)
DIAGNOSIS, 93000: NORMAL
DIFFERENTIAL METHOD BLD: ABNORMAL
EOSINOPHIL # BLD: 0.1 K/UL (ref 0–0.8)
EOSINOPHIL NFR BLD: 0 % (ref 0.5–7.8)
EPI CELLS #/AREA URNS HPF: ABNORMAL /HPF
ERYTHROCYTE [DISTWIDTH] IN BLOOD BY AUTOMATED COUNT: 13.3 %
EST. AVERAGE GLUCOSE BLD GHB EST-MCNC: 123 MG/DL
FLUAV AG NPH QL IA: NEGATIVE
FLUBV AG NPH QL IA: NEGATIVE
GLUCOSE BLD STRIP.AUTO-MCNC: 295 MG/DL (ref 65–100)
GLUCOSE SERPL-MCNC: 151 MG/DL (ref 65–100)
GLUCOSE UR STRIP.AUTO-MCNC: ABNORMAL MG/DL
HBA1C MFR BLD: 5.9 % (ref 4.8–6)
HCT VFR BLD AUTO: 41.4 % (ref 35.8–46.3)
HGB BLD-MCNC: 13.7 G/DL (ref 11.7–15.4)
HGB UR QL STRIP: ABNORMAL
IMM GRANULOCYTES # BLD: 0.2 K/UL (ref 0–0.5)
IMM GRANULOCYTES NFR BLD AUTO: 1 % (ref 0–5)
KETONES UR QL STRIP.AUTO: NEGATIVE MG/DL
LACTATE BLD-SCNC: 1.85 MMOL/L (ref 0.5–1.9)
LEUKOCYTE ESTERASE UR QL STRIP.AUTO: NEGATIVE
LYMPHOCYTES # BLD: 1.8 K/UL (ref 0.5–4.6)
LYMPHOCYTES NFR BLD: 8 % (ref 13–44)
MAGNESIUM SERPL-MCNC: 2.1 MG/DL (ref 1.8–2.4)
MCH RBC QN AUTO: 31.5 PG (ref 26.1–32.9)
MCHC RBC AUTO-ENTMCNC: 33.1 G/DL (ref 31.4–35)
MCV RBC AUTO: 95.2 FL (ref 79.6–97.8)
MONOCYTES # BLD: 0.8 K/UL (ref 0.1–1.3)
MONOCYTES NFR BLD: 4 % (ref 4–12)
NEUTS SEG # BLD: 19.8 K/UL (ref 1.7–8.2)
NEUTS SEG NFR BLD: 87 % (ref 43–78)
NITRITE UR QL STRIP.AUTO: NEGATIVE
NRBC # BLD: 0 K/UL (ref 0–0.2)
OTHER OBSERVATIONS,UCOM: ABNORMAL
P-R INTERVAL, ECG05: 136 MS
PH UR STRIP: 5.5 [PH] (ref 5–9)
PLATELET # BLD AUTO: 258 K/UL (ref 150–450)
PMV BLD AUTO: 10.6 FL (ref 9.4–12.3)
POTASSIUM SERPL-SCNC: 3.8 MMOL/L (ref 3.5–5.1)
PROCALCITONIN SERPL-MCNC: <0.1 NG/ML
PROT UR STRIP-MCNC: NEGATIVE MG/DL
Q-T INTERVAL, ECG07: 370 MS
QRS DURATION, ECG06: 74 MS
QTC CALCULATION (BEZET), ECG08: 418 MS
RBC # BLD AUTO: 4.35 M/UL (ref 4.05–5.2)
RBC #/AREA URNS HPF: ABNORMAL /HPF
SODIUM SERPL-SCNC: 139 MMOL/L (ref 136–145)
SP GR UR REFRACTOMETRY: 1.02 (ref 1–1.02)
SPECIMEN SOURCE: NORMAL
TROPONIN I BLD-MCNC: 0 NG/ML (ref 0.02–0.05)
UROBILINOGEN UR QL STRIP.AUTO: 0.2 EU/DL (ref 0.2–1)
VENTRICULAR RATE, ECG03: 77 BPM
WBC # BLD AUTO: 22.7 K/UL (ref 4.3–11.1)
WBC URNS QL MICRO: ABNORMAL /HPF

## 2018-11-02 PROCEDURE — 36415 COLL VENOUS BLD VENIPUNCTURE: CPT

## 2018-11-02 PROCEDURE — 82962 GLUCOSE BLOOD TEST: CPT

## 2018-11-02 PROCEDURE — 74011250636 HC RX REV CODE- 250/636: Performed by: NURSE PRACTITIONER

## 2018-11-02 PROCEDURE — 74011250636 HC RX REV CODE- 250/636: Performed by: EMERGENCY MEDICINE

## 2018-11-02 PROCEDURE — 94760 N-INVAS EAR/PLS OXIMETRY 1: CPT

## 2018-11-02 PROCEDURE — 96374 THER/PROPH/DIAG INJ IV PUSH: CPT | Performed by: EMERGENCY MEDICINE

## 2018-11-02 PROCEDURE — 99218 HC RM OBSERVATION: CPT

## 2018-11-02 PROCEDURE — 81003 URINALYSIS AUTO W/O SCOPE: CPT

## 2018-11-02 PROCEDURE — 93005 ELECTROCARDIOGRAM TRACING: CPT | Performed by: EMERGENCY MEDICINE

## 2018-11-02 PROCEDURE — 87804 INFLUENZA ASSAY W/OPTIC: CPT

## 2018-11-02 PROCEDURE — 85025 COMPLETE CBC W/AUTO DIFF WBC: CPT

## 2018-11-02 PROCEDURE — 99285 EMERGENCY DEPT VISIT HI MDM: CPT | Performed by: EMERGENCY MEDICINE

## 2018-11-02 PROCEDURE — 71045 X-RAY EXAM CHEST 1 VIEW: CPT

## 2018-11-02 PROCEDURE — 74011000250 HC RX REV CODE- 250: Performed by: EMERGENCY MEDICINE

## 2018-11-02 PROCEDURE — 94640 AIRWAY INHALATION TREATMENT: CPT

## 2018-11-02 PROCEDURE — G0378 HOSPITAL OBSERVATION PER HR: HCPCS

## 2018-11-02 PROCEDURE — 77030013140 HC MSK NEB VYRM -A

## 2018-11-02 PROCEDURE — 84484 ASSAY OF TROPONIN QUANT: CPT

## 2018-11-02 PROCEDURE — 83036 HEMOGLOBIN GLYCOSYLATED A1C: CPT

## 2018-11-02 PROCEDURE — 83605 ASSAY OF LACTIC ACID: CPT

## 2018-11-02 PROCEDURE — 83735 ASSAY OF MAGNESIUM: CPT

## 2018-11-02 PROCEDURE — 83880 ASSAY OF NATRIURETIC PEPTIDE: CPT

## 2018-11-02 PROCEDURE — 84145 PROCALCITONIN (PCT): CPT

## 2018-11-02 PROCEDURE — 80048 BASIC METABOLIC PNL TOTAL CA: CPT

## 2018-11-02 PROCEDURE — 74011250637 HC RX REV CODE- 250/637: Performed by: NURSE PRACTITIONER

## 2018-11-02 PROCEDURE — 74011000250 HC RX REV CODE- 250: Performed by: NURSE PRACTITIONER

## 2018-11-02 RX ORDER — SODIUM CHLORIDE 0.9 % (FLUSH) 0.9 %
5-10 SYRINGE (ML) INJECTION AS NEEDED
Status: DISCONTINUED | OUTPATIENT
Start: 2018-11-02 | End: 2018-11-03 | Stop reason: HOSPADM

## 2018-11-02 RX ORDER — SODIUM CHLORIDE 0.9 % (FLUSH) 0.9 %
5-10 SYRINGE (ML) INJECTION EVERY 8 HOURS
Status: DISCONTINUED | OUTPATIENT
Start: 2018-11-02 | End: 2018-11-03 | Stop reason: HOSPADM

## 2018-11-02 RX ORDER — ALBUTEROL SULFATE 0.83 MG/ML
1.25 SOLUTION RESPIRATORY (INHALATION)
Status: DISCONTINUED | OUTPATIENT
Start: 2018-11-02 | End: 2018-11-03 | Stop reason: HOSPADM

## 2018-11-02 RX ORDER — HYDROCODONE BITARTRATE AND ACETAMINOPHEN 5; 325 MG/1; MG/1
1 TABLET ORAL
Status: DISCONTINUED | OUTPATIENT
Start: 2018-11-02 | End: 2018-11-03 | Stop reason: HOSPADM

## 2018-11-02 RX ORDER — IPRATROPIUM BROMIDE AND ALBUTEROL SULFATE 2.5; .5 MG/3ML; MG/3ML
3 SOLUTION RESPIRATORY (INHALATION)
Status: DISCONTINUED | OUTPATIENT
Start: 2018-11-02 | End: 2018-11-03 | Stop reason: HOSPADM

## 2018-11-02 RX ORDER — IPRATROPIUM BROMIDE AND ALBUTEROL SULFATE 2.5; .5 MG/3ML; MG/3ML
3 SOLUTION RESPIRATORY (INHALATION)
Status: COMPLETED | OUTPATIENT
Start: 2018-11-02 | End: 2018-11-02

## 2018-11-02 RX ORDER — ACETAMINOPHEN 325 MG/1
650 TABLET ORAL
Status: DISCONTINUED | OUTPATIENT
Start: 2018-11-02 | End: 2018-11-03 | Stop reason: HOSPADM

## 2018-11-02 RX ORDER — CYCLOBENZAPRINE HCL 10 MG
10 TABLET ORAL
Status: DISCONTINUED | OUTPATIENT
Start: 2018-11-02 | End: 2018-11-03 | Stop reason: HOSPADM

## 2018-11-02 RX ORDER — LORAZEPAM 0.5 MG/1
0.5 TABLET ORAL
Status: DISCONTINUED | OUTPATIENT
Start: 2018-11-02 | End: 2018-11-03 | Stop reason: HOSPADM

## 2018-11-02 RX ORDER — ZOLPIDEM TARTRATE 5 MG/1
5 TABLET ORAL
Status: DISCONTINUED | OUTPATIENT
Start: 2018-11-02 | End: 2018-11-03 | Stop reason: HOSPADM

## 2018-11-02 RX ORDER — NICOTINE 7MG/24HR
1 PATCH, TRANSDERMAL 24 HOURS TRANSDERMAL EVERY 24 HOURS
Status: DISCONTINUED | OUTPATIENT
Start: 2018-11-02 | End: 2018-11-03 | Stop reason: HOSPADM

## 2018-11-02 RX ORDER — AZITHROMYCIN 250 MG/1
250 TABLET, FILM COATED ORAL DAILY
Status: DISCONTINUED | OUTPATIENT
Start: 2018-11-03 | End: 2018-11-03 | Stop reason: HOSPADM

## 2018-11-02 RX ORDER — LEVOTHYROXINE SODIUM 50 UG/1
50 TABLET ORAL
Status: DISCONTINUED | OUTPATIENT
Start: 2018-11-03 | End: 2018-11-03 | Stop reason: HOSPADM

## 2018-11-02 RX ORDER — PREDNISONE 20 MG/1
40 TABLET ORAL
Status: DISCONTINUED | OUTPATIENT
Start: 2018-11-03 | End: 2018-11-03 | Stop reason: HOSPADM

## 2018-11-02 RX ORDER — GABAPENTIN 100 MG/1
100 CAPSULE ORAL 3 TIMES DAILY
Status: DISCONTINUED | OUTPATIENT
Start: 2018-11-02 | End: 2018-11-03 | Stop reason: HOSPADM

## 2018-11-02 RX ORDER — CEFDINIR 300 MG/1
300 CAPSULE ORAL 2 TIMES DAILY
Status: DISCONTINUED | OUTPATIENT
Start: 2018-11-02 | End: 2018-11-03

## 2018-11-02 RX ORDER — ATORVASTATIN CALCIUM 10 MG/1
10 TABLET, FILM COATED ORAL
Status: DISCONTINUED | OUTPATIENT
Start: 2018-11-02 | End: 2018-11-03 | Stop reason: HOSPADM

## 2018-11-02 RX ORDER — NALOXONE HYDROCHLORIDE 0.4 MG/ML
0.4 INJECTION, SOLUTION INTRAMUSCULAR; INTRAVENOUS; SUBCUTANEOUS AS NEEDED
Status: DISCONTINUED | OUTPATIENT
Start: 2018-11-02 | End: 2018-11-03 | Stop reason: HOSPADM

## 2018-11-02 RX ORDER — ELETRIPTAN HYDROBROMIDE 20 MG/1
20 TABLET, FILM COATED ORAL
COMMUNITY
End: 2018-11-21 | Stop reason: DRUGHIGH

## 2018-11-02 RX ORDER — HEPARIN SODIUM 5000 [USP'U]/ML
5000 INJECTION, SOLUTION INTRAVENOUS; SUBCUTANEOUS EVERY 8 HOURS
Status: DISCONTINUED | OUTPATIENT
Start: 2018-11-02 | End: 2018-11-03 | Stop reason: HOSPADM

## 2018-11-02 RX ORDER — INSULIN LISPRO 100 [IU]/ML
INJECTION, SOLUTION INTRAVENOUS; SUBCUTANEOUS
Status: DISCONTINUED | OUTPATIENT
Start: 2018-11-03 | End: 2018-11-03 | Stop reason: HOSPADM

## 2018-11-02 RX ORDER — ONDANSETRON 2 MG/ML
4 INJECTION INTRAMUSCULAR; INTRAVENOUS
Status: DISCONTINUED | OUTPATIENT
Start: 2018-11-02 | End: 2018-11-03 | Stop reason: HOSPADM

## 2018-11-02 RX ORDER — CITALOPRAM 20 MG/1
20 TABLET, FILM COATED ORAL DAILY
Status: DISCONTINUED | OUTPATIENT
Start: 2018-11-03 | End: 2018-11-03 | Stop reason: HOSPADM

## 2018-11-02 RX ORDER — SODIUM CHLORIDE 9 MG/ML
50 INJECTION, SOLUTION INTRAVENOUS CONTINUOUS
Status: DISCONTINUED | OUTPATIENT
Start: 2018-11-02 | End: 2018-11-03 | Stop reason: HOSPADM

## 2018-11-02 RX ORDER — AMOXICILLIN 250 MG
2 CAPSULE ORAL
Status: DISCONTINUED | OUTPATIENT
Start: 2018-11-02 | End: 2018-11-03 | Stop reason: HOSPADM

## 2018-11-02 RX ORDER — PANTOPRAZOLE SODIUM 40 MG/1
40 TABLET, DELAYED RELEASE ORAL
Status: DISCONTINUED | OUTPATIENT
Start: 2018-11-03 | End: 2018-11-03 | Stop reason: HOSPADM

## 2018-11-02 RX ADMIN — Medication 10 ML: at 23:48

## 2018-11-02 RX ADMIN — Medication 10 ML: at 23:49

## 2018-11-02 RX ADMIN — METHYLPREDNISOLONE SODIUM SUCCINATE 125 MG: 125 INJECTION, POWDER, FOR SOLUTION INTRAMUSCULAR; INTRAVENOUS at 14:29

## 2018-11-02 RX ADMIN — GABAPENTIN 100 MG: 100 CAPSULE ORAL at 18:16

## 2018-11-02 RX ADMIN — SODIUM CHLORIDE 1000 ML: 900 INJECTION, SOLUTION INTRAVENOUS at 14:29

## 2018-11-02 RX ADMIN — SODIUM CHLORIDE 50 ML/HR: 900 INJECTION, SOLUTION INTRAVENOUS at 18:15

## 2018-11-02 RX ADMIN — IPRATROPIUM BROMIDE AND ALBUTEROL SULFATE 3 ML: .5; 3 SOLUTION RESPIRATORY (INHALATION) at 20:45

## 2018-11-02 RX ADMIN — Medication 5 ML: at 17:09

## 2018-11-02 RX ADMIN — IPRATROPIUM BROMIDE AND ALBUTEROL SULFATE 3 ML: .5; 3 SOLUTION RESPIRATORY (INHALATION) at 14:19

## 2018-11-02 RX ADMIN — HEPARIN SODIUM 5000 UNITS: 5000 INJECTION INTRAVENOUS; SUBCUTANEOUS at 23:48

## 2018-11-02 RX ADMIN — Medication 10 ML: at 18:15

## 2018-11-02 RX ADMIN — IPRATROPIUM BROMIDE AND ALBUTEROL SULFATE 3 ML: .5; 3 SOLUTION RESPIRATORY (INHALATION) at 15:19

## 2018-11-02 NOTE — ED NOTES
TRANSFER - OUT REPORT: 
 
Verbal report given to Opal Rivers on [address] [place] [address] Fig  being transferred to [facility] for routine progression of care Report consisted of patients Situation, Background, Assessment and  
Recommendations(SBAR). Information from the following report(s) SBAR, MAR and Recent Results was reviewed with the receiving nurse. Lines:  
Peripheral IV 11/02/18 Left Antecubital (Active) Site Assessment Clean, dry, & intact 11/2/2018  1:25 PM  
Phlebitis Assessment 0 11/2/2018  1:25 PM  
Infiltration Assessment 0 11/2/2018  1:25 PM  
Dressing Status Clean, dry, & intact 11/2/2018  1:25 PM  
  
 
Opportunity for questions and clarification was provided.

## 2018-11-02 NOTE — PROGRESS NOTES
Patient arrived to the floor via stretcher from ED. Patient ambulated to bed with no assistance. No incident noted. Patient is stable condition. Respirations even/unlabored. No acute distress noted. Skin remains intact. Dual skin assessment completed with Angie Taylor RN. No pressure ulcers noted. Patient oriented to floor. Spouse at bedside. Will continue to monitor and carry out MD orders.

## 2018-11-02 NOTE — ED PROVIDER NOTES
58-year-old female presents with complaints of shortness of breath, productive cough Patient has been seen several times by her primary care provider has currently on her second round of antibiotics, Omnicef, and Zithromax. She's been maintained on steroids throughout this time as well. She continues to smoke Patient was sent here to the ER due to lack of improvement with her current treatment. The history is provided by the patient and the spouse. Cough This is a recurrent problem. The current episode started more than 1 week ago. The problem occurs constantly. The problem has been gradually worsening. The cough is productive of sputum. Patient reports a subjective fever - was not measured. Associated symptoms include chest pain, chills, myalgias, shortness of breath and wheezing. Pertinent negatives include no ear pain, no headaches, no rhinorrhea and no vomiting. She has tried inhalers, steroids and antibiotics for the symptoms. The treatment provided no relief. She is a smoker. Her past medical history is significant for pneumonia. Past Medical History:  
Diagnosis Date  Cervical migraine syndrome 8/6/2018  Current smoker  Depression  Environmental and seasonal allergies  GERD (gastroesophageal reflux disease)   
 managed with medication-patient states she has to remain slightly inclined when she sleeps to prevent GERD.  Heart murmur   
 history per patient-\"I was told years ago that I had a murmur. \" Per patient last echo over 10 years ago. No murmur ascultated at PAT appointment.  History of chicken pox  History of migraine headaches PRN medication-followed by PCP  History of TMJ disorder  Hypercholesterolemia   
 managed with medication  Intractable migraine without aura and with status migrainosus 6/27/2017  Measles  Mumps  Occipital neuralgia of left side 10/16/2018  Thyroid disease   
 hypo-managed with medication  Unilateral headache 3/27/2018 Past Surgical History:  
Procedure Laterality Date  HX BREAST AUGMENTATION    
 HX COLONOSCOPY  2016  HX GYN    
 endo ablation and essure tubal  
 HX GYN  1989 dysplasia 2 cone biopsy [facility] TMJ surgery [address] Family History:  
Problem Relation Age of Onset  Hypertension Mother  Hypertension Maternal Grandmother  Ovarian Cancer Neg Hx  Breast Cancer Neg Hx  Colon Cancer Neg Hx Social History Socioeconomic History  Marital status:  Spouse name: Not on file  Number of children: Not on file  Years of education: Not on file  Highest education level: Not on file Social Needs  Financial resource strain: Not on file  Food insecurity - worry: Not on file  Food insecurity - inability: Not on file  Transportation needs - medical: Not on file  Transportation needs - non-medical: Not on file Occupational History  Not on file Tobacco Use  Smoking status: Current Every Day Smoker Packs/day: 0.50 Years: 5.00 Pack years: 2.50  Smokeless tobacco: Never Used Substance and Sexual Activity  Alcohol use: No  
 Drug use: No  
 Sexual activity: Yes  
  Partners: Male Birth control/protection: Surgical  
Other Topics Concern  Not on file Social History Narrative  Not on file ALLERGIES: Uribel [methen-m.blue-s.phos-phsal-hyo]; Bactrim [sulfamethoprim ds]; Diflucan [fluconazole]; Echinacea; Penicillins; and Zyrtec [cetirizine] Review of Systems Constitutional: Positive for chills. Negative for fever. HENT: Negative for congestion, ear pain and rhinorrhea. Eyes: Negative for photophobia and discharge. Respiratory: Positive for cough, shortness of breath and wheezing. Cardiovascular: Positive for chest pain. Negative for palpitations.   
Gastrointestinal: Negative for abdominal pain, constipation, diarrhea and vomiting. Endocrine: Negative for cold intolerance and heat intolerance. Genitourinary: Negative for dysuria and flank pain. Musculoskeletal: Positive for myalgias. Negative for arthralgias and neck pain. Skin: Negative for rash and wound. Allergic/Immunologic: Negative for environmental allergies and food allergies. Neurological: Negative for syncope and headaches. Hematological: Negative for adenopathy. Does not bruise/bleed easily. Psychiatric/Behavioral: Negative for dysphoric mood. The patient is not nervous/anxious. All other systems reviewed and are negative. Vitals:  
 11/02/18 1322 BP: 126/81 Pulse: 99 Resp: 18 Temp: 98.5 °F (36.9 °C) SpO2: 99% Weight: 54.9 kg (121 lb) Height: 5' 7\" (1.702 m) Physical Exam  
Constitutional: She is oriented to person, place, and time. She appears well-developed and well-nourished. She has a sickly appearance. She appears distressed. HENT:  
Head: Normocephalic and atraumatic. Right Ear: External ear normal.  
Left Ear: External ear normal.  
Mouth/Throat: Oropharynx is clear and moist. No oropharyngeal exudate. Eyes: Conjunctivae and EOM are normal. Pupils are equal, round, and reactive to light. Neck: Normal range of motion. Neck supple. No JVD present. Cardiovascular: Normal rate, regular rhythm, normal heart sounds and intact distal pulses. Exam reveals no gallop and no friction rub. No murmur heard. Pulmonary/Chest: Effort normal. No accessory muscle usage. No apnea. No respiratory distress. She has decreased breath sounds. She has wheezes. She has no rales. Rhonchi and wheezing noted in both lower lung fields Abdominal: Soft. Normal appearance and bowel sounds are normal. She exhibits no distension and no mass. There is no hepatosplenomegaly. There is no tenderness. Musculoskeletal: Normal range of motion. She exhibits no edema or deformity. No pedal edema Neurological: She is alert and oriented to person, place, and time. She has normal strength. No cranial nerve deficit or sensory deficit. She displays a negative Romberg sign. Gait normal.  
Skin: Skin is warm and dry. Capillary refill takes less than 2 seconds. No rash noted. Psychiatric: She has a normal mood and affect. Her speech is normal and behavior is normal. Judgment and thought content normal. Cognition and memory are normal.  
Nursing note and vitals reviewed. MDM Number of Diagnoses or Management Options Acute bronchitis, unspecified organism: new and requires workup COPD exacerbation ([facility] [address]): established and worsening Diagnosis management comments: EKG reviewed Sinus rhythm Normal axis, normal intervals No ectopy No acute ischemic changes 3:12 PM 
Patient has persistent wheezing and dyspnea on exertion We'll repeat treatment; ask the hospitalist to evaluate for admission Amount and/or Complexity of Data Reviewed Clinical lab tests: ordered and reviewed Tests in the radiology section of CPT®: ordered and reviewed Tests in the medicine section of CPT®: ordered and reviewed Obtain history from someone other than the patient: yes Review and summarize past medical records: yes Discuss the patient with other providers: yes Independent visualization of images, tracings, or specimens: yes Risk of Complications, Morbidity, and/or Mortality Presenting problems: moderate Diagnostic procedures: moderate Management options: moderate General comments: Elements of this note have been dictated via voice recognition software. Text and phrases may be limited by the accuracy of the software. The chart has been reviewed, but errors may still be present. Patient Progress Patient progress: stable Procedures

## 2018-11-02 NOTE — PROGRESS NOTES
TRANSFER - IN REPORT: 
 
Verbal report received from [place], RN(name) on [address] Fig  being received from ER(unit) for routine progression of care Report consisted of patients Situation, Background, Assessment and  
Recommendations(SBAR). Information from the following report(s) SBAR, Kardex, [facility] and Recent Results was reviewed with the receiving nurse. Opportunity for questions and clarification was provided. Report given to Girish Gilliland RN, who will be assuming primary care on arrival to floor.

## 2018-11-02 NOTE — PROGRESS NOTES
Patient remains in stable condition. Respirations even/unlabored. No acute distress noted. IVF running at MD ordered rate. Safety measures remain in place. SBAR given to DELPHINE Clarke.

## 2018-11-02 NOTE — ED TRIAGE NOTES
PT TO ED WITH C.O POSSIBLE PNEUMONIA - PT REPORTS THAT SHE HAS BEEN SHOB AND PRODUCTIVE COUGH SINCE Monday - PT REPORTS THAT SHE HAS BEEN WHEEZING - PT REPORTS THAT SHE WAS SEEN AT HER PCP TODAY AND SENT TO THE ED

## 2018-11-02 NOTE — H&P
Hospitalist H&P Note Admit Date:  2018  1:28 PM  
Name:  Donnell Rivera  
Age:  52 y.o. 
:  1965 MRN:  [id number] PCP:  Venu Petty MD 
Treatment Team: Attending Provider: Leo Friedman MD 
 
HPI:  
 
Patient is a 49yo female with PMH GERD, Migraines, Depression, Tobacco abuse, who came into the ER after visiting her PCP with reports of productive cough with yellow sputum, wheezing, sore throat, and chills. Patient has been treated by her PCP for bronchitis/COPD exacerbation with doxy and prednisone. Patient then returned without improvement and was given azithromycin and cefdinir for PNA. PCP sent patient to ER today after patient reported she was not improving. CXR negative. WBC 22.7 (on prednisone), afebrile, O2 sats in 90's on room air. Lactic Acid <2.0 . Crackles heard in LLL. Patient reported she hasn't gotten better, but she hasn't gotten any worse with current treatment. Report migraine headache earlier today, but not hurting now. Denies neck pain, HA, sinus drainage, n/v/d, body aches, CP.   
 
 
10 systems reviewed and negative except as noted in HPI. Past Medical History:  
Diagnosis Date  Cervical migraine syndrome 2018  Current smoker  Depression  Environmental and seasonal allergies  GERD (gastroesophageal reflux disease)   
 managed with medication-patient states she has to remain slightly inclined when she sleeps to prevent GERD.  Heart murmur   
 history per patient-\"I was told years ago that I had a murmur. \" Per patient last echo over 10 years ago. No murmur ascultated at PAT appointment.  History of chicken pox  History of migraine headaches PRN medication-followed by PCP  History of TMJ disorder  Hypercholesterolemia   
 managed with medication  Intractable migraine without aura and with status migrainosus 2017  Measles  Mumps  Occipital neuralgia of left side 10/16/2018  Thyroid disease hypo-managed with medication  Unilateral headache 3/27/2018 Past Surgical History:  
Procedure Laterality Date  HX BREAST AUGMENTATION    
 HX COLONOSCOPY  2016  HX GYN    
 endo ablation and essure tubal  
 HX GYN  1989 dysplasia 2 cone biopsy [facility] 27 TMJ surgery [address] Allergies Allergen Reactions  Uribel [Methen-M. Blue-S. Phos-Phsal-Hyo] Anaphylaxis  Bactrim [Sulfamethoprim Ds] Unknown (comments)  Diflucan [Fluconazole] Unknown (comments)  Echinacea Swelling Throat swelling  Penicillins Unknown (comments)  Zyrtec [Cetirizine] Other (comments) Stomach cramps vaginal bleeding Social History Tobacco Use  Smoking status: Current Every Day Smoker Packs/day: 0.50 Years: 5.00 Pack years: 2.50  Smokeless tobacco: Never Used Substance Use Topics  Alcohol use: No  
  
Family History Problem Relation Age of Onset  Hypertension Mother  Hypertension Maternal Grandmother  Ovarian Cancer Neg Hx  Breast Cancer Neg Hx  Colon Cancer Neg Hx Immunization History Administered Date(s) Administered  Pneumococcal Conjugate (PCV-13) 2018 PTA Medications: 
Prior to Admission Medications Prescriptions Last Dose Informant Patient Reported? Taking? EPINEPHrine (EPIPEN) 0.3 mg/0.3 mL injection   No No  
Si.3 mL by IntraMUSCular route once as needed for up to 1 dose. ZOLMitriptan (ZOMIG-ZMT) 5 mg disintegrating tablet   No No  
Sig: Take 1 Tab by mouth as needed for Migraine. albuterol (PROVENTIL HFA, VENTOLIN HFA, PROAIR HFA) 90 mcg/actuation inhaler   No No  
Sig: Take 2 Puffs by inhalation every four (4) hours as needed for Wheezing. atorvastatin (LIPITOR) 10 mg tablet   No No  
Sig: Take 1 Tab by mouth nightly. azithromycin (ZITHROMAX) 250 mg tablet   No No  
Sig: Take 1 Tab by mouth See Admin Instructions for 5 days. cefdinir (OMNICEF) 300 mg capsule   No No  
Sig: Take 1 Cap by mouth two (2) times a day. citalopram (CELEXA) 20 mg tablet   No No  
Sig: Take 1 Tab by mouth daily. cyclobenzaprine (FLEXERIL) 10 mg tablet   No No  
Sig: Take 1 Tab by mouth nightly. doxycycline (MONODOX) 100 mg capsule   No No  
Sig: Take 1 Cap by mouth two (2) times a day. esomeprazole (NEXIUM) 40 mg capsule   No No  
Sig: Take 1 Cap by mouth daily. gabapentin (NEURONTIN) 100 mg capsule   Yes No  
Si tid, may increase by 1 every 3 days up to 2-3 tid prn.  
lamoTRIgine (LAMICTAL) 100 mg tablet   No No  
Si/2 tab qhs x 1 wk, then 1/2 tab bid x 1 wk, 1/2 tab qam and 1 qhs x 1 wk, then 1 bid  
levothyroxine (SYNTHROID) 50 mcg tablet   No No  
Sig: Take 1 Tab by mouth Daily (before breakfast). TAKE 1 TABLET (50 MCG) BY MOUTH DAILY. predniSONE (STERAPRED DS) 10 mg dose pack   No No  
Sig: Take 1 Tab by mouth See Admin Instructions. See administration instruction per 10mg dose pack Facility-Administered Medications: None Objective:  
 
Patient Vitals for the past 24 hrs: 
 Temp Pulse Resp BP SpO2  
18 1558  (!) 101 18 134/79 99 % 18 1519     99 % 18 1439     97 % 18 1322 98.5 °F (36.9 °C) 99 18 126/81 99 % Oxygen Therapy O2 Sat (%): 99 % (18 1558) Pulse via Oximetry: 101 beats per minute (18 1558) O2 Device: Room air (18 1519) No intake or output data in the 24 hours ending 18 1630 Physical Exam: 
General:    Well nourished. Alert. Eyes:   Normal sclera. Extraocular movements intact. ENT:  Normocephalic, atraumatic. Moist mucous membranes. Throat red. CV:   RRR. No m/r/g. Peripheral pulses 2+. Capillary refill <2s. Lungs:  Crackles LLL. Diminished. Room air Abdomen: Soft, nontender, nondistended. Bowel sounds normal.  
Extremities: Warm and dry. No cyanosis or edema. Neurologic: CN II-XII grossly intact. Sensation intact. Skin:     No rashes or jaundice. Normal coloration Psych:  Normal mood and affect. I reviewed the labs, imaging, EKGs, telemetry, and other studies done this admission. Data Review:  
Recent Results (from the past 24 hour(s)) CBC WITH AUTOMATED DIFF Collection Time: 11/02/18  1:26 PM  
Result Value Ref Range WBC 22.7 (H) 4.3 - 11.1 K/uL  
 RBC 4.35 4.05 - 5.2 M/uL  
 HGB 13.7 11.7 - 15.4 g/dL HCT 41.4 35.8 - 46.3 % MCV 95.2 79.6 - 97.8 FL  
 MCH 31.5 26.1 - 32.9 PG  
 MCHC 33.1 31.4 - 35.0 g/dL  
 RDW 13.3 % PLATELET 991 562 - 947 K/uL MPV 10.6 9.4 - 12.3 FL ABSOLUTE NRBC 0.00 0.0 - 0.2 K/uL  
 DF AUTOMATED NEUTROPHILS 87 (H) 43 - 78 % LYMPHOCYTES 8 (L) 13 - 44 % MONOCYTES 4 4.0 - 12.0 % EOSINOPHILS 0 (L) 0.5 - 7.8 % BASOPHILS 0 0.0 - 2.0 % IMMATURE GRANULOCYTES 1 0.0 - 5.0 %  
 ABS. NEUTROPHILS 19.8 (H) 1.7 - 8.2 K/UL  
 ABS. LYMPHOCYTES 1.8 0.5 - 4.6 K/UL  
 ABS. MONOCYTES 0.8 0.1 - 1.3 K/UL  
 ABS. EOSINOPHILS 0.1 0.0 - 0.8 K/UL  
 ABS. BASOPHILS 0.0 0.0 - 0.2 K/UL  
 ABS. IMM. GRANS. 0.2 0.0 - 0.5 K/UL METABOLIC PANEL, BASIC Collection Time: 11/02/18  1:26 PM  
Result Value Ref Range Sodium 139 136 - 145 mmol/L Potassium 3.8 3.5 - 5.1 mmol/L Chloride 103 98 - 107 mmol/L  
 CO2 28 21 - 32 mmol/L Anion gap 8 7 - 16 mmol/L Glucose 151 (H) 65 - 100 mg/dL BUN 8 6 - 23 MG/DL Creatinine 0.80 0.6 - 1.0 MG/DL  
 GFR est AA >60 >60 ml/min/1.73m2 GFR est non-AA >60 >60 ml/min/1.73m2 Calcium 9.1 8.3 - 10.4 MG/DL  
BNP Collection Time: 11/02/18  1:26 PM  
Result Value Ref Range BNP 19 pg/mL POC LACTIC ACID Collection Time: 11/02/18  2:20 PM  
Result Value Ref Range Lactic Acid (POC) 1.85 0.5 - 1.9 mmol/L  
EKG, 12 LEAD, INITIAL Collection Time: 11/02/18  2:21 PM  
Result Value Ref Range Ventricular Rate 77 BPM  
 Atrial Rate 77 BPM  
 P-R Interval 136 ms QRS Duration 74 ms Q-T Interval 370 ms QTC Calculation (Bezet) 418 ms Calculated P Axis 83 degrees Calculated R Axis 81 degrees Calculated T Axis 71 degrees Diagnosis    
  !! AGE AND GENDER SPECIFIC ECG ANALYSIS !! Normal sinus rhythm Biatrial enlargement Anteroseptal infarct , age undetermined Abnormal ECG No previous ECGs available Confirmed by Tiffanie Douglas MD (), JS KEMP ([id number]) on 11/2/2018 3:43:29 PM 
  
POC TROPONIN-I Collection Time: 11/02/18  2:32 PM  
Result Value Ref Range Troponin-I (POC) 0 (L) 0.02 - 0.05 ng/ml All Micro Results None Other Studies: Xr Chest [facility] Result Date: 11/2/2018 Single View portable upright chest x-ray dated   November 2, 2018 Reference Exam: None Indication: Chest pain Findings: The cardiac silhouette is normal in size and contour. The lungs and pulmonary vascularity appear normal.  
 
Impression: Normal single portable chest x-ray. Assessment and Plan:  
 
Hospital Problems as of 11/2/2018 Date Reviewed: 11/2/2018 Codes Class Noted - Resolved POA Dyspnea ICD-10-CM: R06.00 
ICD-9-CM: 786.09  11/2/2018 - Present Unknown PLAN: 
Bronchitis/COPD exacerbation 
-Continue current home abx x 3 days 
-Prednisone 40mg every day 
-Duonebs Q 6 
-CXR negative 
-PRN pain/fever/nausea 
-BNP 
-Procalcintonin Fatigue/Malaise -Influenza 
-Strep GERD 
-Continue home meds Hypothyroidism 
-Continue home medication Tobacco Abuse 
-Cessation counseling 
-nicoderm Discharge planning:  home DVT ppx: SQH Code status:  Full Estimated LOS:  Greater than 2 midnights Risk:  high Plan of care discussed with Dr. Liz Mueller, Signed: 
Yesi Reese NP

## 2018-11-03 VITALS
RESPIRATION RATE: 18 BRPM | WEIGHT: 121 LBS | BODY MASS INDEX: 18.99 KG/M2 | OXYGEN SATURATION: 98 % | HEIGHT: 67 IN | SYSTOLIC BLOOD PRESSURE: 130 MMHG | DIASTOLIC BLOOD PRESSURE: 76 MMHG | TEMPERATURE: 98.1 F | HEART RATE: 87 BPM

## 2018-11-03 LAB
ANION GAP SERPL CALC-SCNC: 7 MMOL/L (ref 7–16)
BASOPHILS # BLD: 0 K/UL (ref 0–0.2)
BASOPHILS NFR BLD: 0 % (ref 0–2)
BUN SERPL-MCNC: 8 MG/DL (ref 6–23)
CALCIUM SERPL-MCNC: 8.5 MG/DL (ref 8.3–10.4)
CHLORIDE SERPL-SCNC: 111 MMOL/L (ref 98–107)
CO2 SERPL-SCNC: 26 MMOL/L (ref 21–32)
CREAT SERPL-MCNC: 0.55 MG/DL (ref 0.6–1)
DEPRECATED S PYO AG THROAT QL EIA: NEGATIVE
DIFFERENTIAL METHOD BLD: ABNORMAL
EOSINOPHIL # BLD: 0 K/UL (ref 0–0.8)
EOSINOPHIL NFR BLD: 0 % (ref 0.5–7.8)
ERYTHROCYTE [DISTWIDTH] IN BLOOD BY AUTOMATED COUNT: 13.2 %
EST. AVERAGE GLUCOSE BLD GHB EST-MCNC: 134 MG/DL
GLUCOSE BLD STRIP.AUTO-MCNC: 108 MG/DL (ref 65–100)
GLUCOSE BLD STRIP.AUTO-MCNC: 117 MG/DL (ref 65–100)
GLUCOSE SERPL-MCNC: 114 MG/DL (ref 65–100)
HBA1C MFR BLD: 6.3 % (ref 4.8–6)
HCT VFR BLD AUTO: 36.5 % (ref 35.8–46.3)
HGB BLD-MCNC: 12 G/DL (ref 11.7–15.4)
IMM GRANULOCYTES # BLD: 0.2 K/UL (ref 0–0.5)
IMM GRANULOCYTES NFR BLD AUTO: 1 % (ref 0–5)
LYMPHOCYTES # BLD: 1.8 K/UL (ref 0.5–4.6)
LYMPHOCYTES NFR BLD: 12 % (ref 13–44)
MCH RBC QN AUTO: 31.3 PG (ref 26.1–32.9)
MCHC RBC AUTO-ENTMCNC: 32.9 G/DL (ref 31.4–35)
MCV RBC AUTO: 95.3 FL (ref 79.6–97.8)
MONOCYTES # BLD: 1 K/UL (ref 0.1–1.3)
MONOCYTES NFR BLD: 6 % (ref 4–12)
NEUTS SEG # BLD: 12.8 K/UL (ref 1.7–8.2)
NEUTS SEG NFR BLD: 81 % (ref 43–78)
NRBC # BLD: 0 K/UL (ref 0–0.2)
PLATELET # BLD AUTO: 211 K/UL (ref 150–450)
PMV BLD AUTO: 10.9 FL (ref 9.4–12.3)
POTASSIUM SERPL-SCNC: 4 MMOL/L (ref 3.5–5.1)
RBC # BLD AUTO: 3.83 M/UL (ref 4.05–5.2)
SODIUM SERPL-SCNC: 144 MMOL/L (ref 136–145)
WBC # BLD AUTO: 15.8 K/UL (ref 4.3–11.1)

## 2018-11-03 PROCEDURE — 36415 COLL VENOUS BLD VENIPUNCTURE: CPT

## 2018-11-03 PROCEDURE — 83036 HEMOGLOBIN GLYCOSYLATED A1C: CPT

## 2018-11-03 PROCEDURE — 74011250637 HC RX REV CODE- 250/637: Performed by: NURSE PRACTITIONER

## 2018-11-03 PROCEDURE — 94761 N-INVAS EAR/PLS OXIMETRY MLT: CPT

## 2018-11-03 PROCEDURE — 99218 HC RM OBSERVATION: CPT

## 2018-11-03 PROCEDURE — 87880 STREP A ASSAY W/OPTIC: CPT

## 2018-11-03 PROCEDURE — 94640 AIRWAY INHALATION TREATMENT: CPT

## 2018-11-03 PROCEDURE — 94760 N-INVAS EAR/PLS OXIMETRY 1: CPT

## 2018-11-03 PROCEDURE — 74011250636 HC RX REV CODE- 250/636: Performed by: NURSE PRACTITIONER

## 2018-11-03 PROCEDURE — 74011636637 HC RX REV CODE- 636/637: Performed by: NURSE PRACTITIONER

## 2018-11-03 PROCEDURE — 82962 GLUCOSE BLOOD TEST: CPT

## 2018-11-03 PROCEDURE — 74011000250 HC RX REV CODE- 250: Performed by: NURSE PRACTITIONER

## 2018-11-03 PROCEDURE — G0378 HOSPITAL OBSERVATION PER HR: HCPCS

## 2018-11-03 PROCEDURE — 80048 BASIC METABOLIC PNL TOTAL CA: CPT

## 2018-11-03 PROCEDURE — 85025 COMPLETE CBC W/AUTO DIFF WBC: CPT

## 2018-11-03 PROCEDURE — 87081 CULTURE SCREEN ONLY: CPT

## 2018-11-03 RX ORDER — AZITHROMYCIN 500 MG/1
500 TABLET, FILM COATED ORAL DAILY
Qty: 3 TAB | Refills: 0 | Status: SHIPPED | OUTPATIENT
Start: 2018-11-03 | End: 2018-11-06 | Stop reason: ALTCHOICE

## 2018-11-03 RX ORDER — PREDNISONE 20 MG/1
20 TABLET ORAL
Qty: 5 TAB | Refills: 0 | Status: SHIPPED | OUTPATIENT
Start: 2018-11-03 | End: 2018-11-06 | Stop reason: ALTCHOICE

## 2018-11-03 RX ORDER — NICOTINE 7MG/24HR
1 PATCH, TRANSDERMAL 24 HOURS TRANSDERMAL EVERY 24 HOURS
Qty: 30 PATCH | Refills: 0 | Status: SHIPPED | OUTPATIENT
Start: 2018-11-03 | End: 2018-12-03

## 2018-11-03 RX ORDER — NEBULIZER AND COMPRESSOR
1 EACH MISCELLANEOUS
Qty: 1 EACH | Refills: 0 | Status: SHIPPED | OUTPATIENT
Start: 2018-11-03 | End: 2019-08-28

## 2018-11-03 RX ORDER — IPRATROPIUM BROMIDE AND ALBUTEROL SULFATE 2.5; .5 MG/3ML; MG/3ML
3 SOLUTION RESPIRATORY (INHALATION)
Qty: 30 NEBULE | Refills: 0 | Status: SHIPPED | OUTPATIENT
Start: 2018-11-03 | End: 2019-08-28

## 2018-11-03 RX ORDER — PREDNISONE 20 MG/1
40 TABLET ORAL
Qty: 6 TAB | Refills: 0 | Status: SHIPPED | OUTPATIENT
Start: 2018-11-04 | End: 2018-11-21

## 2018-11-03 RX ORDER — CEFDINIR 300 MG/1
300 CAPSULE ORAL 2 TIMES DAILY
Status: DISCONTINUED | OUTPATIENT
Start: 2018-11-03 | End: 2018-11-03 | Stop reason: HOSPADM

## 2018-11-03 RX ADMIN — IPRATROPIUM BROMIDE AND ALBUTEROL SULFATE 3 ML: .5; 3 SOLUTION RESPIRATORY (INHALATION) at 08:27

## 2018-11-03 RX ADMIN — Medication 10 ML: at 05:49

## 2018-11-03 RX ADMIN — IPRATROPIUM BROMIDE AND ALBUTEROL SULFATE 3 ML: .5; 3 SOLUTION RESPIRATORY (INHALATION) at 13:42

## 2018-11-03 RX ADMIN — CEFDINIR 300 MG: 300 CAPSULE ORAL at 09:00

## 2018-11-03 RX ADMIN — LEVOTHYROXINE SODIUM 50 MCG: 50 TABLET ORAL at 05:46

## 2018-11-03 RX ADMIN — PREDNISONE 40 MG: 20 TABLET ORAL at 08:18

## 2018-11-03 RX ADMIN — IPRATROPIUM BROMIDE AND ALBUTEROL SULFATE 3 ML: .5; 3 SOLUTION RESPIRATORY (INHALATION) at 01:32

## 2018-11-03 RX ADMIN — HEPARIN SODIUM 5000 UNITS: 5000 INJECTION INTRAVENOUS; SUBCUTANEOUS at 05:47

## 2018-11-03 RX ADMIN — AZITHROMYCIN 250 MG: 250 TABLET, FILM COATED ORAL at 08:18

## 2018-11-03 RX ADMIN — PANTOPRAZOLE SODIUM 40 MG: 40 TABLET, DELAYED RELEASE ORAL at 05:46

## 2018-11-03 RX ADMIN — GABAPENTIN 100 MG: 100 CAPSULE ORAL at 08:18

## 2018-11-03 RX ADMIN — CITALOPRAM HYDROBROMIDE 20 MG: 20 TABLET ORAL at 08:18

## 2018-11-03 NOTE — PROGRESS NOTES
Patient is resting quietly in bed. Assessment completed. Respirations are even and unlabored. Patient denies any pain. No distress at this time. Patient is encouraged to call for assistance. Safety measures in place.

## 2018-11-03 NOTE — PROGRESS NOTES
Patient discharged via wheelchair to private auto, accompanied by spouse and hospital personnel. Patient in stable condition with respirations even/unlabored. No acute distress noted. No further needs noted.

## 2018-11-03 NOTE — PROGRESS NOTES
Report from  that patient urine glucose was 2,000. MD notified. Dr Julieta Rodriguez asked the nurse to check blood sugar.

## 2018-11-03 NOTE — PROGRESS NOTES
Patient took home medications for tonight. Nurse instructed patient not  to take home medications and asked patient to send them home.

## 2018-11-03 NOTE — PROGRESS NOTES
MD has spoken with patient and convinced her to walk with RT before she leaves. RT contacted and will come walk patient. Will continue to monitor.

## 2018-11-03 NOTE — DISCHARGE SUMMARY
Hospitalist Discharge Summary     Patient ID:  Tess Moreno  [id number]  40 y.o.  1965  Admit date: 11/2/2018  1:28 PM  Discharge date and time: 11/3/2018  Attending: Jolane Mcburney, MD  PCP:  Tonja Dailey MD  Treatment Team: Attending Provider: Jolane Mcburney, MD; Utilization Review: Danie Wheat RN    Principal Diagnosis <principal problem not specified>   Active Problems:    COPD exacerbation ([address] (10/25/2018)      Dyspnea (11/2/2018)     Hospital Course: \"51yo female with PMH GERD, Migraines, Depression, Tobacco abuse, who came into the ER after visiting her PCP with reports of productive cough with yellow sputum, wheezing, sore throat, and chills. Patient has been treated by her PCP for bronchitis/COPD exacerbation with doxy and prednisone. Patient then returned without improvement and was given azithromycin and cefdinir for PNA. PCP sent patient to ER patient reported she was not improving.      CXR negative. WBC 22.7 (on prednisone), afebrile, O2 sats in 90's on room air. Lactic Acid <2.0 . Crackles heard in LLL. \"    Pro calcitonin within normal limits. Patient was placed on scheduled nebulizer treatments. Throughout stay, patient's breathing improved significantly and satting well on RA. Still had cough but patient started to threaten leaving AMA because she \"cannot sleep nor smoke a cigarette. \" In order for the patient to receive her medications, will discharge but stated the importance for close follow up with her PCP. She understands and agrees. If worsening SOB, chest pain, or AMS, please come back to the ED. Vitals stable, patient has mild cough with wheezing on exam, but no desaturations in oxygen levels. Will discharge home with dounebs, compressor machine, nicotine patches, Prednisone 40mg daily for the next three days, then titrate to prednisone 20mg daily for three days, and then prednisone 10mg daily until finished.  Azithromycin daily for three days,       Please refer to the admission H&P for details of presentation. In summary, the patient is stable for discharge. Significant Diagnostic Studies:       Labs: Results:       Chemistry Recent Labs     11/03/18  0534 11/02/18  1326   * 151*    139   K 4.0 3.8   * 103   CO2 26 28   BUN 8 8   CREA 0.55* 0.80   CA 8.5 9.1   AGAP 7 8      CBC w/Diff Recent Labs     11/03/18  0534 11/02/18  1326   WBC 15.8* 22.7*   RBC 3.83* 4.35   HGB 12.0 13.7   HCT 36.5 41.4    258   GRANS 81* 87*   LYMPH 12* 8*   EOS 0* 0*      Cardiac Enzymes No results for input(s): CPK, CKND1, VIDYA in the last 72 hours. No lab exists for component: CKRMB, TROIP   Coagulation No results for input(s): PTP, INR, APTT in the last 72 hours. No lab exists for component: INREXT    Lipid Panel Lab Results   Component Value Date/Time    Cholesterol, total 172 06/27/2017 09:04 AM    HDL Cholesterol 32 (L) 06/27/2017 09:04 AM    LDL, calculated 116 (H) 06/27/2017 09:04 AM    VLDL, calculated 24 06/27/2017 09:04 AM    Triglyceride 121 06/27/2017 09:04 AM      BNP No results for input(s): BNPP in the last 72 hours. Liver Enzymes No results for input(s): TP, ALB, TBIL, AP, SGOT, GPT in the last 72 hours. No lab exists for component: DBIL   Thyroid Studies Lab Results   Component Value Date/Time    TSH 3.510 08/03/2018 12:29 PM            Discharge Exam:  Visit Vitals  /76   Pulse 87   Temp 98.1 °F (36.7 °C)   Resp 18   Ht 5' 7\" (1.702 m)   Wt 54.9 kg (121 lb)   SpO2 98%   BMI 18.95 kg/m²     General appearance: alert, cooperative, no distress, appears stated age  Lungs: inspiratory wheezing at lower lobes, more so on the left. Heart: regular rate and rhythm, S1, S2 normal, no murmur, click, rub or gallop  Abdomen: soft, non-tender.  Bowel sounds normal. No masses,  no organomegaly  Extremities: no cyanosis or edema  Neurologic: Grossly normal    Disposition: home   Discharge Condition: stable  Patient Instructions: as above Current Discharge Medication List      START taking these medications    Details   albuterol-ipratropium (DUO-NEB) 2.5 mg-0.5 mg/3 ml nebu 3 mL by Nebulization route every six (6) hours as needed. Qty: 30 Nebule, Refills: 0      nicotine (NICODERM CQ) 7 mg/24 hr 1 Patch by TransDERmal route every twenty-four (24) hours for 30 days. Qty: 30 Patch, Refills: 0      predniSONE (DELTASONE) 20 mg tablet Take 2 Tabs by mouth daily (with breakfast). Qty: 6 Tab, Refills: 0      Nebulizer & Compressor machine 1 Each by Nebulization route every six (6) hours as needed. Qty: 1 Each, Refills: 0         CONTINUE these medications which have CHANGED    Details   azithromycin (ZITHROMAX) 500 mg tab Take 1 Tab by mouth daily. Qty: 3 Tab, Refills: 0         CONTINUE these medications which have NOT CHANGED    Details   eletriptan (RELPAX) 20 mg tablet Take 20 mg by mouth two (2) times daily as needed for Migraine. may repeat in 2 hours if necessary      albuterol (PROVENTIL HFA, VENTOLIN HFA, PROAIR HFA) 90 mcg/actuation inhaler Take 2 Puffs by inhalation every four (4) hours as needed for Wheezing. Qty: 1 Inhaler, Refills: 1    Associated Diagnoses: COPD exacerbation (HCC)      ZOLMitriptan (ZOMIG-ZMT) 5 mg disintegrating tablet Take 1 Tab by mouth as needed for Migraine. Qty: 15 Tab, Refills: 1    Associated Diagnoses: Intractable migraine without aura and with status migrainosus      gabapentin (NEURONTIN) 100 mg capsule 1 tid, may increase by 1 every 3 days up to 2-3 tid prn. Currently taking BID    Associated Diagnoses: Intractable migraine without aura and with status migrainosus; Primary osteoarthritis of both hands      citalopram (CELEXA) 20 mg tablet Take 1 Tab by mouth daily. Qty: 90 Tab, Refills: 0      levothyroxine (SYNTHROID) 50 mcg tablet Take 1 Tab by mouth Daily (before breakfast). TAKE 1 TABLET (50 MCG) BY MOUTH DAILY.   Qty: 90 Tab, Refills: 1    Associated Diagnoses: Acquired hypothyroidism esomeprazole (NEXIUM) 40 mg capsule Take 1 Cap by mouth daily. Qty: 90 Cap, Refills: 1    Associated Diagnoses: Gastroesophageal reflux disease without esophagitis      atorvastatin (LIPITOR) 10 mg tablet Take 1 Tab by mouth nightly. Qty: 90 Tab, Refills: 1    Associated Diagnoses: Mixed hyperlipidemia      cyclobenzaprine (FLEXERIL) 10 mg tablet Take 1 Tab by mouth nightly. Qty: 90 Tab, Refills: 1    Associated Diagnoses: Jaw pain      EPINEPHrine (EPIPEN) 0.3 mg/0.3 mL injection 0.3 mL by IntraMUSCular route once as needed for up to 1 dose. Qty: 2 Syringe, Refills: 1         STOP taking these medications       cefdinir (OMNICEF) 300 mg capsule Comments:   Reason for Stopping:         predniSONE (STERAPRED DS) 10 mg dose pack Comments:   Reason for Stopping:               Activity: up and keny   Diet: Regular   Wound Care: none     Follow-up PCP in one week.    ·     Time spent to discharge patient 35 minutes  Signed:  Luma Madrid DO  11/3/2018  2:15 PM

## 2018-11-03 NOTE — DISCHARGE INSTRUCTIONS
Chronic Obstructive Pulmonary Disease (COPD): Care Instructions  Your Care Instructions    Chronic obstructive pulmonary disease (COPD) is a general term for a group of lung diseases, including emphysema and chronic bronchitis. People with COPD have decreased airflow in and out of the lungs, which makes it hard to breathe. The airways also can get clogged with thick mucus. Cigarette smoking is a major cause of COPD. Although there is no cure for COPD, you can slow its progress. Following your treatment plan and taking care of yourself can help you feel better and live longer. Follow-up care is a key part of your treatment and safety. Be sure to make and go to all appointments, and call your doctor if you are having problems. It's also a good idea to know your test results and keep a list of the medicines you take. How can you care for yourself at home?   Staying healthy    · Do not smoke. This is the most important step you can take to prevent more damage to your lungs. If you need help quitting, talk to your doctor about stop-smoking programs and medicines. These can increase your chances of quitting for good.     · Avoid colds and flu. Get a pneumococcal vaccine shot. If you have had one before, ask your doctor whether you need a second dose. Get the flu vaccine every fall. If you must be around people with colds or the flu, wash your hands often.     · Avoid secondhand smoke, air pollution, and high altitudes. Also avoid cold, dry air and hot, humid air. Stay at home with your windows closed when air pollution is bad.    Medicines and oxygen therapy    · Take your medicines exactly as prescribed. Call your doctor if you think you are having a problem with your medicine.     · You may be taking medicines such as:  ? Bronchodilators. These help open your airways and make breathing easier. Bronchodilators are either short-acting (work for 6 to 9 hours) or long-acting (work for 24 hours).  You inhale most bronchodilators, so they start to act quickly. Always carry your quick-relief inhaler with you in case you need it while you are away from home. ? Corticosteroids (prednisone, budesonide). These reduce airway inflammation. They come in pill or inhaled form. You must take these medicines every day for them to work well.     · A spacer may help you get more inhaled medicine to your lungs. Ask your doctor or pharmacist if a spacer is right for you. If it is, ask how to use it properly.     · Do not take any vitamins, over-the-counter medicine, or herbal products without talking to your doctor first.     · If your doctor prescribed antibiotics, take them as directed. Do not stop taking them just because you feel better. You need to take the full course of antibiotics.     · Oxygen therapy boosts the amount of oxygen in your blood and helps you breathe easier. Use the flow rate your doctor has recommended, and do not change it without talking to your doctor first.   Activity    · Get regular exercise. Walking is an easy way to get exercise. Start out slowly, and walk a little more each day.     · Pay attention to your breathing. You are exercising too hard if you cannot talk while you are exercising.     · Take short rest breaks when doing household chores and other activities.     · Learn breathing methods--such as breathing through pursed lips--to help you become less short of breath.     · If your doctor has not set you up with a pulmonary rehabilitation program, talk to him or her about whether rehab is right for you. Rehab includes exercise programs, education about your disease and how to manage it, help with diet and other changes, and emotional support. Diet    · Eat regular, healthy meals. Use bronchodilators about 1 hour before you eat to make it easier to eat. Eat several small meals instead of three large ones.  Drink beverages at the end of the meal. Avoid foods that are hard to chew.     · Eat foods that contain protein so that you do not lose muscle mass.     · Talk with your doctor if you gain too much weight or if you lose weight without trying.    Mental health    · Talk to your family, friends, or a therapist about your feelings. It is normal to feel frightened, angry, hopeless, helpless, and even guilty. Talking openly about bad feelings can help you cope. If these feelings last, talk to your doctor. When should you call for help? Call 911 anytime you think you may need emergency care. For example, call if:    · You have severe trouble breathing.    Call your doctor now or seek immediate medical care if:    · You have new or worse trouble breathing.     · You cough up blood.     · You have a fever.    Watch closely for changes in your health, and be sure to contact your doctor if:    · You cough more deeply or more often, especially if you notice more mucus or a change in the color of your mucus.     · You have new or worse swelling in your legs or belly.     · You are not getting better as expected. Where can you learn more? Go to http://tariq-roxana.info/. Pascual Hashimoto in the search box to learn more about \"Chronic Obstructive Pulmonary Disease (COPD): Care Instructions. \"  Current as of: December 6, 2017  Content Version: 11.8  © 3079-0393 [employer]. Care instructions adapted under license by [employer] (which disclaims liability or warranty for this information). If you have questions about a medical condition or this instruction, always ask your healthcare professional. [place] [address] any warranty or liability for your use of this information.       DISCHARGE SUMMARY from Nurse    PATIENT INSTRUCTIONS:    After general anesthesia or intravenous sedation, for 24 hours or while taking prescription Narcotics:  Limit your activities  Do not drive and operate hazardous machinery  Do not make important personal or business decisions  Do  not drink alcoholic beverages  If you have not urinated within 8 hours after discharge, please contact your surgeon on call. Report the following to your surgeon:  Excessive pain, swelling, redness or odor of or around the surgical area  Temperature over 100.5  Nausea and vomiting lasting longer than 4 hours or if unable to take medications  Any signs of decreased circulation or nerve impairment to extremity: change in color, persistent  numbness, tingling, coldness or increase pain  Any questions    What to do at Home:  Recommended activity: Activity as tolerated,     If you experience any of the following symptoms fever greater then 100.5, pain unrelieved by medication, increase in shortness of breath , please follow up with primary care doctor. *  Please give a list of your current medications to your Primary Care Provider. *  Please update this list whenever your medications are discontinued, doses are      changed, or new medications (including over-the-counter products) are added. *  Please carry medication information at all times in case of emergency situations. These are general instructions for a healthy lifestyle:    No smoking/ No tobacco products/ Avoid exposure to second hand smoke  Surgeon General's Warning:  Quitting smoking now greatly reduces serious risk to your health. Obesity, smoking, and sedentary lifestyle greatly increases your risk for illness    A healthy diet, regular physical exercise & weight monitoring are important for maintaining a healthy lifestyle    You may be retaining fluid if you have a history of heart failure or if you experience any of the following symptoms:  Weight gain of 3 pounds or more overnight or 5 pounds in a week, increased swelling in our hands or feet or shortness of breath while lying flat in bed. Please call your doctor as soon as you notice any of these symptoms; do not wait until your next office visit.     Recognize signs and symptoms of STROKE:    F-face looks uneven    A-arms unable to move or move unevenly    S-speech slurred or non-existent    T-time-call 911 as soon as signs and symptoms begin-DO NOT go       Back to bed or wait to see if you get better-TIME IS BRAIN. Warning Signs of HEART ATTACK     Call 911 if you have these symptoms:  Chest discomfort. Most heart attacks involve discomfort in the center of the chest that lasts more than a few minutes, or that goes away and comes back. It can feel like uncomfortable pressure, squeezing, fullness, or pain. Discomfort in other areas of the upper body. Symptoms can include pain or discomfort in one or both arms, the back, neck, jaw, or stomach. Shortness of breath with or without chest discomfort. Other signs may include breaking out in a cold sweat, nausea, or lightheadedness. Don't wait more than five minutes to call 911 - MINUTES MATTER! Fast action can save your life. Calling 911 is almost always the fastest way to get lifesaving treatment. Emergency Medical Services staff can begin treatment when they arrive -- up to an hour sooner than if someone gets to the hospital by car. The discharge information has been reviewed with the patient. The patient verbalized understanding. Discharge medications reviewed with the patient and appropriate educational materials and side effects teaching were provided.   ___________________________________________________________________________________________________________________________________

## 2018-11-03 NOTE — PROGRESS NOTES
Patient leaving AMA. Patient has signed papers and IV has been removed. MD notified and requests to come speak with the patient first. Pharmacy is sending one of the patients home medications back to the unit and will return.

## 2018-11-03 NOTE — PROGRESS NOTES
Oxygen Qualifier Room air: SpO2 with O2 and liter flow Resting SpO2  98% Ambulating SpO2  95% Completed by: 
 
Salvador Rubio, RT

## 2018-11-03 NOTE — PROGRESS NOTES
Gave discharge instructions given to the pt. The pt voices a clear understanding, no iv to remove. All questions answered.

## 2018-11-03 NOTE — PROGRESS NOTES
BOLIVARAR recevied from Vazquez Grady RN. Patient remains in stable condition with respirations even/unlabored. No acute distress noted at this time. Call light remains within reach, patient encouraged to call nurse prn assist. Will continue to monitor per policy.

## 2018-11-05 LAB
BACTERIA SPEC CULT: NORMAL
SERVICE CMNT-IMP: NORMAL

## 2018-11-06 PROBLEM — F17.211 CIGARETTE NICOTINE DEPENDENCE IN REMISSION: Status: ACTIVE | Noted: 2018-01-02

## 2018-11-06 PROBLEM — J44.1 COPD EXACERBATION (HCC): Status: RESOLVED | Noted: 2018-10-25 | Resolved: 2018-11-06

## 2019-02-27 PROBLEM — J18.9 PNEUMONIA OF LEFT LOWER LOBE DUE TO INFECTIOUS ORGANISM: Status: RESOLVED | Noted: 2018-11-02 | Resolved: 2019-02-27

## 2019-04-01 ENCOUNTER — HOSPITAL ENCOUNTER (OUTPATIENT)
Dept: GENERAL RADIOLOGY | Age: 54
Discharge: HOME | End: 2019-04-01

## 2019-04-01 DIAGNOSIS — M25.522 LEFT ELBOW PAIN: ICD-10-CM

## 2019-04-01 NOTE — PROGRESS NOTES
XR looks OK, I am not sure what is going on with her elbow. Is it feeling any better? Has she been icing it? Thanks.

## 2019-06-07 PROBLEM — M62.838 TRAPEZIUS MUSCLE SPASM: Status: ACTIVE | Noted: 2019-06-07

## 2019-09-24 PROBLEM — Z12.31 SCREENING MAMMOGRAM, ENCOUNTER FOR: Status: RESOLVED | Noted: 2018-04-18 | Resolved: 2019-09-24

## 2019-09-24 PROBLEM — Z23 ENCOUNTER FOR IMMUNIZATION: Status: RESOLVED | Noted: 2018-01-02 | Resolved: 2019-09-24

## 2019-12-03 PROBLEM — J30.9 ALLERGIC RHINITIS: Status: ACTIVE | Noted: 2019-12-03

## 2019-12-03 PROBLEM — Z91.030 BEE STING ALLERGY: Status: ACTIVE | Noted: 2019-12-03

## 2019-12-03 PROBLEM — M25.561 ACUTE PAIN OF RIGHT KNEE: Status: ACTIVE | Noted: 2019-12-03

## 2019-12-03 PROBLEM — M06.9 RHEUMATOID ARTHRITIS INVOLVING BOTH HANDS (HCC): Status: ACTIVE | Noted: 2019-12-03

## 2020-02-15 PROBLEM — M17.11 OSTEOARTHRITIS OF RIGHT KNEE: Status: ACTIVE | Noted: 2020-02-15

## 2020-05-28 PROBLEM — M22.41 CHONDROMALACIA OF RIGHT PATELLA: Status: ACTIVE | Noted: 2020-05-28

## 2020-05-28 PROBLEM — M25.561 CHRONIC PAIN OF RIGHT KNEE: Status: ACTIVE | Noted: 2020-05-28

## 2020-05-28 PROBLEM — G89.29 CHRONIC PAIN OF RIGHT KNEE: Status: ACTIVE | Noted: 2020-05-28

## 2020-05-28 PROBLEM — Z12.11 SCREEN FOR COLON CANCER: Status: ACTIVE | Noted: 2020-05-28

## 2020-06-27 PROBLEM — Z12.11 SCREEN FOR COLON CANCER: Status: RESOLVED | Noted: 2020-05-28 | Resolved: 2020-06-27

## 2020-06-30 PROBLEM — M54.2 NECK PAIN: Status: ACTIVE | Noted: 2020-06-30

## 2020-07-02 ENCOUNTER — ANESTHESIA EVENT (OUTPATIENT)
Dept: ENDOSCOPY | Age: 55
End: 2020-07-02
Payer: COMMERCIAL

## 2020-07-02 RX ORDER — SODIUM CHLORIDE, SODIUM LACTATE, POTASSIUM CHLORIDE, CALCIUM CHLORIDE 600; 310; 30; 20 MG/100ML; MG/100ML; MG/100ML; MG/100ML
100 INJECTION, SOLUTION INTRAVENOUS CONTINUOUS
Status: CANCELLED | OUTPATIENT
Start: 2020-07-02

## 2020-07-02 RX ORDER — SODIUM CHLORIDE 0.9 % (FLUSH) 0.9 %
5-40 SYRINGE (ML) INJECTION AS NEEDED
Status: CANCELLED | OUTPATIENT
Start: 2020-07-02

## 2020-07-02 RX ORDER — SODIUM CHLORIDE 0.9 % (FLUSH) 0.9 %
5-40 SYRINGE (ML) INJECTION EVERY 8 HOURS
Status: CANCELLED | OUTPATIENT
Start: 2020-07-02

## 2020-07-03 ENCOUNTER — ANESTHESIA (OUTPATIENT)
Dept: ENDOSCOPY | Age: 55
End: 2020-07-03
Payer: COMMERCIAL

## 2020-07-03 ENCOUNTER — HOSPITAL ENCOUNTER (OUTPATIENT)
Age: 55
Setting detail: OUTPATIENT SURGERY
Discharge: HOME | End: 2020-07-03
Attending: SURGERY | Admitting: SURGERY
Payer: COMMERCIAL

## 2020-07-03 VITALS
SYSTOLIC BLOOD PRESSURE: 112 MMHG | TEMPERATURE: 98.6 F | RESPIRATION RATE: 20 BRPM | OXYGEN SATURATION: 100 % | WEIGHT: 122 LBS | HEIGHT: 66 IN | DIASTOLIC BLOOD PRESSURE: 64 MMHG | BODY MASS INDEX: 19.61 KG/M2 | HEART RATE: 76 BPM

## 2020-07-03 DIAGNOSIS — Z12.11 ENCOUNTER FOR COLONOSCOPY DUE TO HISTORY OF ADENOMATOUS COLONIC POLYPS: ICD-10-CM

## 2020-07-03 DIAGNOSIS — Z86.010 ENCOUNTER FOR COLONOSCOPY DUE TO HISTORY OF ADENOMATOUS COLONIC POLYPS: ICD-10-CM

## 2020-07-03 PROCEDURE — 45378 DIAGNOSTIC COLONOSCOPY: CPT | Performed by: SURGERY

## 2020-07-03 PROCEDURE — 76040000026: Performed by: SURGERY

## 2020-07-03 PROCEDURE — 74011250636 HC RX REV CODE- 250/636: Performed by: NURSE ANESTHETIST, CERTIFIED REGISTERED

## 2020-07-03 PROCEDURE — 76060000032 HC ANESTHESIA 0.5 TO 1 HR: Performed by: SURGERY

## 2020-07-03 PROCEDURE — 74011250636 HC RX REV CODE- 250/636: Performed by: ANESTHESIOLOGY

## 2020-07-03 PROCEDURE — 74011000250 HC RX REV CODE- 250: Performed by: NURSE ANESTHETIST, CERTIFIED REGISTERED

## 2020-07-03 RX ORDER — PROPOFOL 10 MG/ML
INJECTION, EMULSION INTRAVENOUS
Status: DISCONTINUED | OUTPATIENT
Start: 2020-07-03 | End: 2020-07-03 | Stop reason: HOSPADM

## 2020-07-03 RX ORDER — SODIUM CHLORIDE, SODIUM LACTATE, POTASSIUM CHLORIDE, CALCIUM CHLORIDE 600; 310; 30; 20 MG/100ML; MG/100ML; MG/100ML; MG/100ML
100 INJECTION, SOLUTION INTRAVENOUS CONTINUOUS
Status: DISCONTINUED | OUTPATIENT
Start: 2020-07-03 | End: 2020-07-03 | Stop reason: HOSPADM

## 2020-07-03 RX ORDER — PROPOFOL 10 MG/ML
INJECTION, EMULSION INTRAVENOUS AS NEEDED
Status: DISCONTINUED | OUTPATIENT
Start: 2020-07-03 | End: 2020-07-03 | Stop reason: HOSPADM

## 2020-07-03 RX ORDER — LIDOCAINE HYDROCHLORIDE 20 MG/ML
INJECTION, SOLUTION EPIDURAL; INFILTRATION; INTRACAUDAL; PERINEURAL AS NEEDED
Status: DISCONTINUED | OUTPATIENT
Start: 2020-07-03 | End: 2020-07-03 | Stop reason: HOSPADM

## 2020-07-03 RX ORDER — SODIUM CHLORIDE 9 MG/ML
10 INJECTION, SOLUTION INTRAVENOUS CONTINUOUS
Status: DISCONTINUED | OUTPATIENT
Start: 2020-07-03 | End: 2020-07-03 | Stop reason: HOSPADM

## 2020-07-03 RX ADMIN — LIDOCAINE HYDROCHLORIDE 40 MG: 20 INJECTION, SOLUTION EPIDURAL; INFILTRATION; INTRACAUDAL; PERINEURAL at 08:38

## 2020-07-03 RX ADMIN — SODIUM CHLORIDE, SODIUM LACTATE, POTASSIUM CHLORIDE, AND CALCIUM CHLORIDE 100 ML/HR: 600; 310; 30; 20 INJECTION, SOLUTION INTRAVENOUS at 08:06

## 2020-07-03 RX ADMIN — PROPOFOL 70 MG: 10 INJECTION, EMULSION INTRAVENOUS at 08:38

## 2020-07-03 RX ADMIN — PROPOFOL 160 MCG/KG/MIN: 10 INJECTION, EMULSION INTRAVENOUS at 08:38

## 2020-07-03 NOTE — PROCEDURES
Procedure in Detail:  Informed consent was obtained for the procedure. The patient was placed in the left lateral decubitus position and sedation was induced by anesthesia. The scope was inserted into the rectum and advanced under direct vision to the cecum, which was identified by presumed ileocecal valve and transillumination. The quality of the colonic preparation was poor,with formed stool at all flexures, and sludge throughout which was lavaged. A careful inspection was made as the colonoscope was withdrawn, including a retroflexed view of the rectum; findings and interventions are described below. Appropriate photodocumentation was obtained. Findings:   no mass identified in any segment. visualized segments of mucosa of the transverse, descending, and rectal segments were healthy with no polyps    Specimens: No specimens were collected. Complications: None; patient tolerated the procedure well. \    EBL - none    Recommendations:   - Repeat colonoscopy in 3 years.      - If < 10 years, reason: inadequate prep     PT WILL REQUIRE 2-DAY PREP     Signed By: Irais Smith MD                        July 3, 2020

## 2020-07-03 NOTE — ROUTINE PROCESS
Pt discharged home with , Yousif Harness,  driving, VSS, instructions reviewed with Pt and , understanding verbalized. All belongings sent home with Pt. Pt transported out via wheelchair by yeison Burton

## 2020-07-03 NOTE — DISCHARGE INSTRUCTIONS
Chloe Bryant M.D.  [phone]    Instructions following colonoscopy:    ACTIVITY:   Resume usual, basic activities around the house today.  You may be light-headed or sleepy from anesthesia, so be careful going up and down stairs.  Avoid driving, operating machinery, or signing documents for 24 hours. DIET:   No restriction. Please note, some people may have nausea or cramps after this procedure which can result in an upset stomach after eating.  Do not drink alcohol today.  Many people have loose stools or diarrhea immediately after colonoscopy. It is also not uncommon to not have a bowel movement for 2-3 days. PAIN:   Some cramping or gas pain is normal after colonoscopy. However, if you experience worsening pain over the course of the day, or pain with associated fever please call the office immediately      [address] IF:   You have a temperature higher than 101.5° Fahrenheit for more than 6 hours.  You have severe nausea or vomiting not relieved by medication; or diarrhea. Repeat colonoscopy in 3 years due to inadequate prep. Please do a two day prep prior to next colonoscopy.     PT WILL REQUIRE 2-DAY PREP        Otherwise, continue home medications as previously prescribed.

## 2020-07-03 NOTE — ANESTHESIA PREPROCEDURE EVALUATION
Relevant Problems   No relevant active problems       Anesthetic History   No history of anesthetic complications            Review of Systems / Medical History  Patient summary reviewed and pertinent labs reviewed    Pulmonary          Smoker (vapes)         Neuro/Psych         Headaches     Cardiovascular              Hyperlipidemia    Exercise tolerance: >4 METS     GI/Hepatic/Renal     GERD: well controlled           Endo/Other      Hypothyroidism: well controlled  Arthritis     Other Findings              Physical Exam    Airway  Mallampati: II  TM Distance: 4 - 6 cm  Neck ROM: normal range of motion   Mouth opening: Normal     Cardiovascular  Regular rate and rhythm,  S1 and S2 normal,  no murmur, click, rub, or gallop  Rhythm: regular  Rate: normal         Dental      Comments: Temp crown   Pulmonary  Breath sounds clear to auscultation               Abdominal  Abdominal exam normal       Other Findings            Anesthetic Plan    ASA: 2  Anesthesia type: total IV anesthesia          Induction: Intravenous

## 2020-07-03 NOTE — H&P
History and Physical      Patient: Hermilo Powell    Physician: Radha Cruz MD    Referring Physician: Davy Willis MD    Chief Complaint: For colonoscopy    History of Present Illness: Pt presents for colonoscopy. Last exam date: dr Diogenes [place]- 9/2016. Findings:normal colonic mucosa throughout and 5 total polyps, TAs and Hyperplastic- numbers of each not specified in office notes;. History:  Past Medical History:   Diagnosis Date    Cervical migraine syndrome 8/6/2018    Current smoker     Depression     Environmental and seasonal allergies     GERD (gastroesophageal reflux disease)     managed with medication-patient states she has to remain slightly inclined when she sleeps to prevent GERD.  Heart murmur     history per patient-\"I was told years ago that I had a murmur. \" Per patient last echo over 10 years ago. No murmur ascultated at PAT appointment.      High cholesterol 02/15/2020    History of chicken pox     History of migraine headaches     PRN medication-followed by PCP     History of TMJ disorder     Hypercholesterolemia     managed with medication     Intractable migraine without aura and with status migrainosus 6/27/2017    Measles     Mumps     Neck pain 6/30/2020    Nicotine vapor product user     Occipital neuralgia of left side 10/16/2018    Thyroid disease     hypo-managed with medication     Trapezius muscle spasm 6/7/2019    Unilateral headache 3/27/2018     Past Surgical History:   Procedure Laterality Date    HX BREAST AUGMENTATION      HX COLONOSCOPY  2016    HX GYN      endo ablation and essure tubal    HX GYN  1989 dysplasia    2 cone biopsy      HX HEENT  1990    TMJ surgery     IMPLANT BREAST SILICONE/EQ        Social History     Socioeconomic History    Marital status:      Spouse name: Not on file    Number of children: Not on file    Years of education: Not on file    Highest education level: Not on file   Tobacco Use    Smoking status: Current Every Day Smoker     Packs/day: 0.50     Years: 5.00     Pack years: 2.50    Smokeless tobacco: Never Used    Tobacco comment: vape    Substance and Sexual Activity    Alcohol use: No    Drug use: No    Sexual activity: Yes     Partners: Male     Birth control/protection: Surgical   Other Topics Concern      Family History   Problem Relation Age of Onset    Hypertension Mother     Hypertension Maternal Grandmother     Ovarian Cancer Neg Hx     Breast Cancer Neg Hx     Colon Cancer Neg Hx        Medications:   Prior to Admission medications    Medication Sig Start Date End Date Taking? Authorizing Provider   rizatriptan (MAXALT-MLT) 10 mg disintegrating tablet Young@[employer] migraine, may repeat in 2h prn, max 2/24h, do not use > 3d/wk 6/30/20  Yes Maria Isabel Mitchell MD   atorvastatin (LIPITOR) 10 mg tablet Take 1 Tab by mouth nightly. 5/28/20  Yes Adrianne Yang MD   diclofenac (VOLTAREN) 1 % gel Apply  to affected area four (4) times daily. 5/28/20  Yes Adrianne Yang MD   promethazine (PHENERGAN) 25 mg tablet Take 1 Tab by mouth nightly as needed for Nausea. 5/28/20  Yes Adrianne Yang MD   butalbital-acetaminophen-caffeine (FIORICET, ESGIC) -40 mg per tablet 1 for moderate to severe migraine, may repeat in 4-6 hrs. Do not take > 3d/wk, 24 tabs/mo 5/28/20  Yes Adrianne Yang MD   montelukast (SINGULAIR) 10 mg tablet TAKE 1 TABLET BY MOUTH EVERY DAY 5/22/20  Yes Steven MCNAMARA MD   tiZANidine (ZANAFLEX) 4 mg tablet 1 qhs, may up to 1.5 mg in 2 weeks prn, max 2 qhs or 1 bid. Stop flexeril. Indications: muscle spasms caused by a spinal disease 4/16/20  Yes Maria Isabel Mitchell MD   FLUoxetine (PROZAC) 20 mg capsule TAKE 1 CAPSULE BY MOUTH EVERY DAY 3/9/20  Yes Steven MCNAMARA MD   levothyroxine (SYNTHROID) 100 mcg tablet TAKE 1 TAB BY MOUTH DAILY (BEFORE BREAKFAST). TAKE 1 TABLET (50 MCG) BY MOUTH DAILY.  2/27/20  Yes Adrianne Ynag MD   ondansetron hcl (ZOFRAN) 8 mg tablet Take 1 Tab by mouth every eight (8) hours as needed for Nausea (2 months supply). 12/13/19  Yes Nessa Rodriguez MD   galcanezumab-gnlm (EMGALITY) 120 mg/mL injection 1 mL by SubCUTAneous route every thirty (30) days. 12/13/19  Yes Nessa Rodriguez MD   methotrexate (RHEUMATREX) 2.5 mg tablet Every Saturday. Saturday 8/9/19  Yes Provider, Historical   hydroxychloroquine (PLAQUENIL) 200 mg tablet Take 200 mg by mouth. 8/9/19  Yes Provider, Historical   esomeprazole (NEXIUM) 40 mg capsule Take 1 Cap by mouth daily. 8/3/18  Yes Terra Carrasco MD       Allergies: Allergies   Allergen Reactions    Fire Ant Anaphylaxis    Hymenoptera Allergenic Extract Anaphylaxis    Ayan Bury [Methen-M. Blue-S. Phos-Phsal-Hyo] Anaphylaxis    Bactrim [Sulfamethoprim Ds] Unknown (comments)    Diflucan [Fluconazole] Unknown (comments)    Echinacea Swelling     Throat swelling     Levaquin [Levofloxacin] Other (comments)     Drops her blood pressure    Penicillins Unknown (comments)    Zyrtec [Cetirizine] Other (comments)     Stomach cramps vaginal bleeding       Physical Exam:     Vital Signs:   Visit Vitals  /58   Pulse 86   Temp 98.1 °F (36.7 °C)   Resp 17   Ht 5' 6\" (1.676 m)   Wt 122 lb (55.3 kg)   SpO2 98%   Breastfeeding No   BMI 19.69 kg/m²     . General: no distress      Heart: regular   Lungs: unlabored   Abdominal: soft   Neurological: Grossly normal        Findings/Diagnosis: Encounter for colorectal cancer screening due to personal history of adenomatous polyp(s)     Plan of Care/Planned Procedure: Colonoscopy, possible polypectomy. Pt/designee has reviewed the colonoscopy information sheet. Any questions have been discussed. They agree to proceed.       Signed:  Bel Menard MD   7/3/2020

## 2020-07-03 NOTE — ANESTHESIA POSTPROCEDURE EVALUATION
Procedure(s): 
COLONOSCOPY 20. 
 
total IV anesthesia Anesthesia Post Evaluation Multimodal analgesia: multimodal analgesia used between 6 hours prior to anesthesia start to PACU discharge Patient location during evaluation: bedside Patient participation: complete - patient participated Level of consciousness: awake Pain management: adequate Airway patency: patent Anesthetic complications: no 
Cardiovascular status: acceptable and stable Respiratory status: acceptable and room air Hydration status: acceptable INITIAL Post-op Vital signs: No vitals data found for the desired time range.

## 2021-01-06 PROBLEM — E86.0 DEHYDRATION: Status: ACTIVE | Noted: 2021-01-06

## 2021-01-06 PROBLEM — K59.09 OTHER CONSTIPATION: Status: ACTIVE | Noted: 2021-01-06

## 2021-01-06 PROBLEM — A08.4 STOMACH FLU: Status: ACTIVE | Noted: 2021-01-06

## 2021-03-10 PROBLEM — Z91.89 AT RISK FOR PROLONGED QT INTERVAL SYNDROME: Status: ACTIVE | Noted: 2021-03-10

## 2021-03-10 PROBLEM — R94.31 ABNORMAL EKG: Status: ACTIVE | Noted: 2021-03-10

## 2021-03-10 PROBLEM — L82.1 SEBORRHEIC KERATOSES: Status: ACTIVE | Noted: 2021-03-10

## 2021-03-24 PROBLEM — Z71.3 DIETARY COUNSELING: Status: ACTIVE | Noted: 2020-05-28

## 2021-03-24 PROBLEM — E78.5 HYPERLIPIDEMIA: Status: ACTIVE | Noted: 2017-06-27

## 2021-03-31 PROBLEM — T78.2XXA ANAPHYLACTIC SYNDROME: Status: ACTIVE | Noted: 2021-03-31

## 2021-06-28 PROBLEM — R53.82 CHRONIC FATIGUE: Status: ACTIVE | Noted: 2021-06-28

## 2021-06-28 PROBLEM — R10.13 EPIGASTRIC PAIN: Status: ACTIVE | Noted: 2021-06-28

## 2021-06-29 ENCOUNTER — HOSPITAL ENCOUNTER (OUTPATIENT)
Dept: GENERAL RADIOLOGY | Age: 56
Discharge: HOME | End: 2021-06-29

## 2021-06-29 DIAGNOSIS — R10.13 EPIGASTRIC PAIN: ICD-10-CM

## 2021-11-17 PROBLEM — E55.9 AVITAMINOSIS D: Status: ACTIVE | Noted: 2021-11-17

## 2021-11-17 PROBLEM — M79.671 RIGHT FOOT PAIN: Status: ACTIVE | Noted: 2021-11-17

## 2021-11-17 PROBLEM — D50.9 IRON DEFICIENCY ANEMIA: Status: ACTIVE | Noted: 2021-11-17

## 2021-11-17 PROBLEM — Z12.31 SCREENING MAMMOGRAM FOR BREAST CANCER: Status: ACTIVE | Noted: 2021-11-17

## 2021-12-07 NOTE — PROGRESS NOTES
Please notify patient that I have reviewed brain MRI report and pictures, there are several spots could be related migraine or others. Will bring her back for discussion. chronic severe severe chronic

## 2021-12-15 ENCOUNTER — ANESTHESIA EVENT (OUTPATIENT)
Dept: SURGERY | Age: 56
End: 2021-12-15
Payer: COMMERCIAL

## 2021-12-16 ENCOUNTER — HOSPITAL ENCOUNTER (OUTPATIENT)
Age: 56
Setting detail: OUTPATIENT SURGERY
Discharge: HOME | End: 2021-12-16
Attending: ORTHOPAEDIC SURGERY | Admitting: ORTHOPAEDIC SURGERY
Payer: COMMERCIAL

## 2021-12-16 ENCOUNTER — APPOINTMENT (OUTPATIENT)
Dept: GENERAL RADIOLOGY | Age: 56
End: 2021-12-16
Attending: ORTHOPAEDIC SURGERY
Payer: COMMERCIAL

## 2021-12-16 ENCOUNTER — ANESTHESIA (OUTPATIENT)
Dept: SURGERY | Age: 56
End: 2021-12-16
Payer: COMMERCIAL

## 2021-12-16 VITALS
TEMPERATURE: 97.6 F | HEART RATE: 71 BPM | DIASTOLIC BLOOD PRESSURE: 67 MMHG | SYSTOLIC BLOOD PRESSURE: 130 MMHG | WEIGHT: 125 LBS | BODY MASS INDEX: 20.18 KG/M2 | OXYGEN SATURATION: 98 % | RESPIRATION RATE: 16 BRPM

## 2021-12-16 DIAGNOSIS — G89.18 ACUTE POST-OPERATIVE PAIN: Primary | ICD-10-CM

## 2021-12-16 PROCEDURE — 76210000063 HC OR PH I REC FIRST 0.5 HR: Performed by: ORTHOPAEDIC SURGERY

## 2021-12-16 PROCEDURE — 77030003602 HC NDL NRV BLK BBMI -B: Performed by: ANESTHESIOLOGY

## 2021-12-16 PROCEDURE — C1769 GUIDE WIRE: HCPCS | Performed by: ORTHOPAEDIC SURGERY

## 2021-12-16 PROCEDURE — 77030002916 HC SUT ETHLN J&J -A: Performed by: ORTHOPAEDIC SURGERY

## 2021-12-16 PROCEDURE — 74011250636 HC RX REV CODE- 250/636: Performed by: ANESTHESIOLOGY

## 2021-12-16 PROCEDURE — 76210000020 HC REC RM PH II FIRST 0.5 HR: Performed by: ORTHOPAEDIC SURGERY

## 2021-12-16 PROCEDURE — 77030040922 HC BLNKT HYPOTHRM STRY -A: Performed by: ANESTHESIOLOGY

## 2021-12-16 PROCEDURE — C1713 ANCHOR/SCREW BN/BN,TIS/BN: HCPCS | Performed by: ORTHOPAEDIC SURGERY

## 2021-12-16 PROCEDURE — 28297 COR HLX VLGS JT ARTHRD: CPT | Performed by: NURSE PRACTITIONER

## 2021-12-16 PROCEDURE — 76010010054 HC POST OP PAIN BLOCK: Performed by: ORTHOPAEDIC SURGERY

## 2021-12-16 PROCEDURE — 74011250636 HC RX REV CODE- 250/636: Performed by: NURSE ANESTHETIST, CERTIFIED REGISTERED

## 2021-12-16 PROCEDURE — 28308 INCISION OF METATARSAL: CPT | Performed by: ORTHOPAEDIC SURGERY

## 2021-12-16 PROCEDURE — 74011000250 HC RX REV CODE- 250: Performed by: NURSE ANESTHETIST, CERTIFIED REGISTERED

## 2021-12-16 PROCEDURE — 76942 ECHO GUIDE FOR BIOPSY: CPT | Performed by: ORTHOPAEDIC SURGERY

## 2021-12-16 PROCEDURE — 20900 REMOVAL OF BONE FOR GRAFT: CPT | Performed by: ORTHOPAEDIC SURGERY

## 2021-12-16 PROCEDURE — 77030000032 HC CUF TRNQT ZIMM -B: Performed by: ORTHOPAEDIC SURGERY

## 2021-12-16 PROCEDURE — 28297 COR HLX VLGS JT ARTHRD: CPT | Performed by: ORTHOPAEDIC SURGERY

## 2021-12-16 PROCEDURE — 77030002933 HC SUT MCRYL J&J -A: Performed by: ORTHOPAEDIC SURGERY

## 2021-12-16 PROCEDURE — 77030002982 HC SUT POLYSRB J&J -A: Performed by: ORTHOPAEDIC SURGERY

## 2021-12-16 PROCEDURE — 77030006788 HC BLD SAW OSC STRY -B: Performed by: ORTHOPAEDIC SURGERY

## 2021-12-16 PROCEDURE — 28310 REVISION OF BIG TOE: CPT | Performed by: ORTHOPAEDIC SURGERY

## 2021-12-16 PROCEDURE — 77030029732 HC BIT DRL ORTHOLOC 3DI WRGH -B: Performed by: ORTHOPAEDIC SURGERY

## 2021-12-16 PROCEDURE — 77030020275 HC MISC ORTHOPEDIC: Performed by: ORTHOPAEDIC SURGERY

## 2021-12-16 PROCEDURE — 20900 REMOVAL OF BONE FOR GRAFT: CPT | Performed by: NURSE PRACTITIONER

## 2021-12-16 PROCEDURE — 76010000161 HC OR TIME 1 TO 1.5 HR INTENSV-TIER 1: Performed by: ORTHOPAEDIC SURGERY

## 2021-12-16 PROCEDURE — 77030019908 HC STETH ESOPH SIMS -A: Performed by: ANESTHESIOLOGY

## 2021-12-16 PROCEDURE — 74011250636 HC RX REV CODE- 250/636: Performed by: NURSE PRACTITIONER

## 2021-12-16 PROCEDURE — 2709999900 HC NON-CHARGEABLE SUPPLY: Performed by: ORTHOPAEDIC SURGERY

## 2021-12-16 PROCEDURE — 28308 INCISION OF METATARSAL: CPT | Performed by: NURSE PRACTITIONER

## 2021-12-16 PROCEDURE — 76060000033 HC ANESTHESIA 1 TO 1.5 HR: Performed by: ORTHOPAEDIC SURGERY

## 2021-12-16 DEVICE — IMPLANTABLE DEVICE
Type: IMPLANTABLE DEVICE | Site: FOOT | Status: FUNCTIONAL
Brand: ORTHOLOC 3DI

## 2021-12-16 DEVICE — IMPLANTABLE DEVICE
Type: IMPLANTABLE DEVICE | Site: FOOT | Status: FUNCTIONAL
Brand: ORTHOLOC

## 2021-12-16 DEVICE — IMPLANTABLE DEVICE: Type: IMPLANTABLE DEVICE | Site: FOOT | Status: FUNCTIONAL

## 2021-12-16 DEVICE — IMPLANTABLE DEVICE
Type: IMPLANTABLE DEVICE | Site: FOOT | Status: FUNCTIONAL
Brand: FUSEFORCE

## 2021-12-16 DEVICE — K-WIRE: Type: IMPLANTABLE DEVICE | Site: FOOT | Status: FUNCTIONAL

## 2021-12-16 DEVICE — SNAP-OFF SCREW
Type: IMPLANTABLE DEVICE | Site: FOOT | Status: FUNCTIONAL
Brand: CHARLOTTE

## 2021-12-16 DEVICE — IMPLANTABLE DEVICE
Type: IMPLANTABLE DEVICE | Site: FOOT | Status: FUNCTIONAL
Brand: ORTHOLOC™ 2 LAPIFUSE™

## 2021-12-16 RX ORDER — MIDAZOLAM HYDROCHLORIDE 1 MG/ML
2 INJECTION, SOLUTION INTRAMUSCULAR; INTRAVENOUS
Status: COMPLETED | OUTPATIENT
Start: 2021-12-16 | End: 2021-12-16

## 2021-12-16 RX ORDER — LIDOCAINE HYDROCHLORIDE 20 MG/ML
INJECTION, SOLUTION EPIDURAL; INFILTRATION; INTRACAUDAL; PERINEURAL AS NEEDED
Status: DISCONTINUED | OUTPATIENT
Start: 2021-12-16 | End: 2021-12-16 | Stop reason: HOSPADM

## 2021-12-16 RX ORDER — ALBUTEROL SULFATE 0.83 MG/ML
2.5 SOLUTION RESPIRATORY (INHALATION) AS NEEDED
Status: DISCONTINUED | OUTPATIENT
Start: 2021-12-16 | End: 2021-12-16 | Stop reason: HOSPADM

## 2021-12-16 RX ORDER — ROPIVACAINE HYDROCHLORIDE 5 MG/ML
INJECTION, SOLUTION EPIDURAL; INFILTRATION; PERINEURAL
Status: COMPLETED | OUTPATIENT
Start: 2021-12-16 | End: 2021-12-16

## 2021-12-16 RX ORDER — SODIUM CHLORIDE, SODIUM LACTATE, POTASSIUM CHLORIDE, CALCIUM CHLORIDE 600; 310; 30; 20 MG/100ML; MG/100ML; MG/100ML; MG/100ML
1000 INJECTION, SOLUTION INTRAVENOUS CONTINUOUS
Status: DISCONTINUED | OUTPATIENT
Start: 2021-12-16 | End: 2021-12-16 | Stop reason: HOSPADM

## 2021-12-16 RX ORDER — PROPOFOL 10 MG/ML
INJECTION, EMULSION INTRAVENOUS
Status: DISCONTINUED | OUTPATIENT
Start: 2021-12-16 | End: 2021-12-16 | Stop reason: HOSPADM

## 2021-12-16 RX ORDER — HYDROMORPHONE HYDROCHLORIDE 2 MG/1
2 TABLET ORAL
Qty: 30 TABLET | Refills: 0 | Status: SHIPPED | OUTPATIENT
Start: 2021-12-16 | End: 2021-12-21

## 2021-12-16 RX ORDER — OXYCODONE HYDROCHLORIDE 5 MG/1
5 TABLET ORAL
Status: DISCONTINUED | OUTPATIENT
Start: 2021-12-16 | End: 2021-12-16 | Stop reason: HOSPADM

## 2021-12-16 RX ORDER — PROPOFOL 10 MG/ML
INJECTION, EMULSION INTRAVENOUS AS NEEDED
Status: DISCONTINUED | OUTPATIENT
Start: 2021-12-16 | End: 2021-12-16 | Stop reason: HOSPADM

## 2021-12-16 RX ORDER — CEFAZOLIN SODIUM/WATER 2 G/20 ML
2 SYRINGE (ML) INTRAVENOUS ONCE
Status: COMPLETED | OUTPATIENT
Start: 2021-12-16 | End: 2021-12-16

## 2021-12-16 RX ORDER — ACETAMINOPHEN 500 MG
1000 TABLET ORAL ONCE
Status: DISCONTINUED | OUTPATIENT
Start: 2021-12-16 | End: 2021-12-16 | Stop reason: HOSPADM

## 2021-12-16 RX ORDER — SODIUM CHLORIDE 0.9 % (FLUSH) 0.9 %
5-40 SYRINGE (ML) INJECTION EVERY 8 HOURS
Status: DISCONTINUED | OUTPATIENT
Start: 2021-12-16 | End: 2021-12-16 | Stop reason: HOSPADM

## 2021-12-16 RX ORDER — CEPHALEXIN 500 MG/1
500 CAPSULE ORAL 4 TIMES DAILY
Qty: 12 CAPSULE | Refills: 0 | Status: SHIPPED | OUTPATIENT
Start: 2021-12-16 | End: 2022-02-23 | Stop reason: ALTCHOICE

## 2021-12-16 RX ORDER — SODIUM CHLORIDE 0.9 % (FLUSH) 0.9 %
5-40 SYRINGE (ML) INJECTION AS NEEDED
Status: DISCONTINUED | OUTPATIENT
Start: 2021-12-16 | End: 2021-12-16 | Stop reason: HOSPADM

## 2021-12-16 RX ORDER — LIDOCAINE HYDROCHLORIDE 10 MG/ML
0.1 INJECTION INFILTRATION; PERINEURAL AS NEEDED
Status: DISCONTINUED | OUTPATIENT
Start: 2021-12-16 | End: 2021-12-16 | Stop reason: HOSPADM

## 2021-12-16 RX ORDER — HYDROMORPHONE HYDROCHLORIDE 2 MG/ML
0.5 INJECTION, SOLUTION INTRAMUSCULAR; INTRAVENOUS; SUBCUTANEOUS
Status: DISCONTINUED | OUTPATIENT
Start: 2021-12-16 | End: 2021-12-16 | Stop reason: HOSPADM

## 2021-12-16 RX ORDER — ONDANSETRON 2 MG/ML
4 INJECTION INTRAMUSCULAR; INTRAVENOUS
Status: DISCONTINUED | OUTPATIENT
Start: 2021-12-16 | End: 2021-12-16 | Stop reason: HOSPADM

## 2021-12-16 RX ADMIN — LIDOCAINE HYDROCHLORIDE 40 MG: 20 INJECTION, SOLUTION EPIDURAL; INFILTRATION; INTRACAUDAL; PERINEURAL at 12:31

## 2021-12-16 RX ADMIN — ROPIVACAINE HYDROCHLORIDE 15 ML: 5 INJECTION, SOLUTION EPIDURAL; INFILTRATION; PERINEURAL at 10:42

## 2021-12-16 RX ADMIN — CEFAZOLIN 2 G: 1 INJECTION, POWDER, FOR SOLUTION INTRAVENOUS at 12:35

## 2021-12-16 RX ADMIN — PROPOFOL 100 MCG/KG/MIN: 10 INJECTION, EMULSION INTRAVENOUS at 12:36

## 2021-12-16 RX ADMIN — SODIUM CHLORIDE, SODIUM LACTATE, POTASSIUM CHLORIDE, AND CALCIUM CHLORIDE 1000 ML: 600; 310; 30; 20 INJECTION, SOLUTION INTRAVENOUS at 09:59

## 2021-12-16 RX ADMIN — MIDAZOLAM 2 MG: 1 INJECTION INTRAMUSCULAR; INTRAVENOUS at 10:29

## 2021-12-16 RX ADMIN — ROPIVACAINE HYDROCHLORIDE 20 ML: 5 INJECTION, SOLUTION EPIDURAL; INFILTRATION; PERINEURAL at 10:39

## 2021-12-16 RX ADMIN — PROPOFOL 40 MG: 10 INJECTION, EMULSION INTRAVENOUS at 12:31

## 2021-12-16 NOTE — OP NOTES
FULL OP NOTE    PATIENT NAME: Tegan Vizcaino  MRN: [id number]    DATE OF SURGERY: 12/16/2021    PREOPERATIVE DIAGNOSIS: Hallux valgus, right [M20.11]  Metatarsalgia of right foot [M77.41]      POSTOPERATIVE DIAGNOSIS: Hallux valgus, right [M20.11]  Metatarsalgia of right foot [M77.41]      PROCEDURE: 1. Right Lapidus bunionectomy, [id number]                            2.  Right closing wedge proximal phalangeal osteotomy of the great toe, Zac osteotomy, 28282                             3.  Right second MTP Weil osteotomy, 56342                             4.  Right calcaneal autograft harvest, 20900    SURGEON: Marisela Polk MD    ASSISTANT: Keyanna Diaz NP  An assistant was required for positioning, retraction, and wound closure for this procedure. HARDWARE:   Implant Name Type Inv.  Item Serial No.  Lot No. LRB No. Used Action   STAPLE BNE FIX [id number] NIT - [id number]  STAPLE BNE FIX [id number] NIT  [facility]_ [id number] Right 1 Implanted   PLATE BNE LAPIDUS STD RT ORTHOLOC 2 LAPIFUSE - [id number]  PLATE BNE LAPIDUS STD RT ORTHOLOC 2 LAPIFUSE  [employer] [id number] Right 1 Implanted   SCREW BNE L18MM DIA3.5MM ANATOLY FT ANK TI KIRILL FULL THRD FOR - [id number]  SCREW BNE L18MM DIA3.5MM ANATOLY FT ANK TI KIRILL FULL THRD FOR  [employer] [id number] Right 2 Implanted   SCREW BNE L20MM DIA3.5MM ANATOLY FT ANK TI KIRILL FULL THRD FOR - [id number]  SCREW BNE L20MM DIA3.5MM ANATOLY FT ANK TI KIRILL FULL THRD FOR  [employer] [id number] Right 2 Implanted   SCREW BNE L18MM DIA3.5MM ANATOLY FT ANK TI NONLOCKING FULL - [id number]  SCREW BNE L18MM DIA3.5MM ANATOLY FT ANK TI NONLOCKING FULL  [employer] [id number] Right 1 Implanted   SCREW BNE L40MM OD4MM SELF MIKAEL VIVEROS LNG THRD MAXTORQUE - [id number]  SCREW BNE L40MM OD4MM SELF DRL FELICE LNG THRD [facility]  [employer]WD [id number] Right 1 Implanted   K WIRE FIX L150MM DIA2. 5MM FOR ORTHOLOC 3DI SYS - [id number]  K WIRE FIX L150MM DIA2. 5MM FOR [employer]_WD [id number] Right 2 Implanted   SCREW BNE L11MM DIA2MM ANATOLY TI ALLY ST SELF DRL FELICE - [id number]  SCREW BNE L11MM DIA2MM ANATOLY TI ALLY ST SELF DRL FELICE  [employer]WD [id number] Right 1 Implanted     INDICATIONS: This patient is a 60y.o. year old female with a history of Hallux valgus, right [M20.11]  Metatarsalgia of right foot [M77.41] who has failed conservative therapy and desires surgical treatment. Risks and benefits of the procedure including, but not limited to, anesthetic complications as well as surgical complications including damage to nerves and blood vessels, risk of infection, risk of incomplete pain relief, risk of malunion, nonunion and need for additional surgery have been discussed with the patient who wishes to proceed. PROCEDURE IN DETAIL: A time out was done to confirm the operating procedure, surgeon, patient and site. A block was placed by the department of anesthesia. During a preop surgical timeout the right lower extremity was identified as the correct surgical site and prepped and draped in a standard sterile fashion using ChloraPrep solution. A 1 cm approach to the lateral calcaneus well was opened at that time. An [email or web address] bone graft harvester was introduced and approximately 5 cc of cancellous graft was obtained the calcaneus and later used into the TMT arthrodesis site. This wound was irrigated and closed using Monocryl nylon sutures. A dorsal approach the first webspace was an open. The fibular sesamoid was then released and there was a release of the lateral first MTP joint capsule performed at that time. A medial approach the first MTP joint was an open and carried at the level of tarsometatarsal joint. A longitudinal capsulotomy was performed. The medial eminence was resected using oscillating saw.   The first TMT joint was prepared using a curette followed by drill bit and osteotomes. Cancellous autograft was then packed into the joint and the clamp was then used to reduce the patient's intermetatarsal angle and correct her malrotation of her first metatarsal.  After confirming satisfactory reduction of the first TMT a guidewire for the lag screw was in place in the middle cuneiform and a lag screw was placed over this with good purchase noted. A dorsal medial plate was affixed using proper length locking and nonlocking screws. An Zac osteotomy was performed the proximal phalanx of the great toe and secured using a compression staple as well. A capsulotomy of the second MTP joint was and opened and Weil osteotomy was performed with the capital fragment shifted approximately 2 mm proximally and secured using a twist off screw. The wounds were then irrigated and closed using Vicryl and the capsule followed by Monocryl nylon sutures on the skin. Sterile dressings and applied followed by well-padded posterior splint. Anesthesia was discontinued. The patient was transferred back to recovery bed. She was taken to recovery in satisfactory condition. She appeared to tolerate the procedure well. There were no apparent trouble or anesthetic complications. All needle and sponge counts were correct. Postoperatively she is nonweightbearing in a splint taking aspirin 325 mg daily for DVT prophylaxis. TOURNIQUET TIME: Approx 42 minutes. SPECIMENS: none    ESTIMATED BLOOD LOSS: min mL.

## 2021-12-16 NOTE — ANESTHESIA PROCEDURE NOTES
Peripheral Block    Start time: 12/16/2021 10:40 AM  End time: 12/16/2021 10:42 AM  Performed by: Karma Bowens MD  Authorized by: Karma Bowens MD       Pre-procedure: Indications: at surgeon's request and post-op pain management    Preanesthetic Checklist: patient identified, risks and benefits discussed, site marked, timeout performed, anesthesia consent given and patient being monitored    Timeout Time: 10:40 EST          Block Type:   Block Type:   Adductor canal  Laterality:  Right  Monitoring:  Continuous pulse ox, frequent vital sign checks, heart rate, oxygen and responsive to questions  Injection Technique:  Single shot  Procedures: ultrasound guided    Patient Position: supine  Prep: chlorhexidine    Location:  Mid thigh  Needle Gauge:  20 G  Needle Localization:  Ultrasound guidance  Medication Injected:  Ropivacaine (PF) (NAROPIN)(0.5%) 5 mg/mL injection, 15 mL  Med Admin Time: 12/16/2021 10:42 AM    Assessment:  Number of attempts:  1  Injection Assessment:  Incremental injection every 5 mL, local visualized surrounding nerve on ultrasound, negative aspiration for blood, no intravascular symptoms, no paresthesia and ultrasound image on chart  Patient tolerance:  Patient tolerated the procedure well with no immediate complications

## 2021-12-16 NOTE — H&P
Outpatient Surgery History and Physical:  Carisa Joel was seen and examined. CHIEF COMPLAINT:   Right foot. PE:   There were no vitals taken for this visit.     Heart:   Regular rhythm      Lungs:  Are clear      Past Medical History:    Patient Active Problem List    Diagnosis    Screening mammogram for breast cancer    Iron deficiency anemia    Avitaminosis D    Right foot pain    Chronic fatigue    Epigastric pain    Anaphylactic syndrome    Abnormal EKG    At risk for prolonged QT interval syndrome    Seborrheic keratoses    Stomach flu    Other constipation    Dehydration    Neck pain    Dietary counseling    Chronic pain of right knee    Chondromalacia of right patella    Osteoarthritis of right knee    Rheumatoid arthritis involving both hands (HCC)    Bee sting allergy    Allergic rhinitis    Acute pain of right knee    Trapezius muscle spasm    Dyspnea    Bilateral hand swelling    Bilateral occipital neuralgia    Cervical migraine syndrome    Night sweats    Simple chronic bronchitis (HCC)    Unilateral headache    Jaw pain    MDD (major depressive disorder), recurrent, in partial remission (HCC)    Cigarette nicotine dependence in remission    Gastroesophageal reflux disease without esophagitis    Lipoma of torso    Hyperlipidemia    Intractable migraine without aura and with status migrainosus    Acquired hypothyroidism    Arthritis    Osteoarthritis of both hands       Surgical History:   Past Surgical History:   Procedure Laterality Date    COLONOSCOPY N/A 7/3/2020    COL[facility]COPY 20 [facility] ENDOSCOPY    HX BREAST AUGMENTATION      HX CHOLECYSTECTOMY      liver biopsy    HX COLONOSCOPY  2016    HX GYN      endo ablation and essure tubal    HX GYN  1989 dysplasia    2 cone biopsy      HX HEENT  1990    TMJ surgery     HX LIPOMA RESECTION Right     right hip/side    IMPLANT BREAST SILICONE/EQ      [place] COLONOSCOPY FLX DX W/COLLJ SPEC WHEN PFRMD  7/3/2020            Social History: Patient  reports that she has quit smoking. Her smoking use included cigarettes. She has a 2.50 pack-year smoking history. She has quit using smokeless tobacco. She reports that she does not drink alcohol and does not use drugs. Family History:   Family History   Problem Relation Age of Onset    Hypertension Mother     Hypertension Maternal Grandmother     Ovarian Cancer Neg Hx     Breast Cancer Neg Hx     Colon Cancer Neg Hx        Allergies: Reviewed per EMR  Allergies   Allergen Reactions    Covid-19 Vaccine, Mrna, [id number], Lnp-S Charisse Mac) Anaphylaxis    Fire Ant Anaphylaxis    Hymenoptera Allergenic Extract Anaphylaxis    Emmit Terry [Methen-M. Blue-S. Phos-Phsal-Hyo] Anaphylaxis    Bactrim [Sulfamethoprim Ds] Unknown (comments)    Diflucan [Fluconazole] Unknown (comments)    Echinacea Swelling     Throat swelling     Levaquin [Levofloxacin] Other (comments)     Drops her blood pressure    Nickel Rash    Penicillins Unknown (comments)    Zyrtec [Cetirizine] Other (comments)     Stomach cramps vaginal bleeding       Medications:    No current facility-administered medications on file prior to encounter. Current Outpatient Medications on File Prior to Encounter   Medication Sig    butalbital-acetaminophen-caffeine (FIORICET, ESGIC) -40 mg per tablet TAKE 1 TABLET BY MOUTH FOR MODERATE/SEVERE MIGRAINE *MAY REPEAT IN 4-6 HRS, NO MORE THAN 3 DAYS WEEKLY AND 24 TABLETS MONTHLY    diclofenac (VOLTAREN) 1 % gel Apply  to affected area four (4) times daily. (Patient taking differently: Apply  to affected area four (4) times daily. Hold for procedure)    montelukast (SINGULAIR) 10 mg tablet Take 1 Tablet by mouth daily. (Patient taking differently: Take 10 mg by mouth nightly.)    traZODone (DESYREL) 100 mg tablet Take 1 Tablet by mouth nightly.  (Patient taking differently: Take 50 mg by mouth nightly.)    FLUoxetine (PROzac) 20 mg capsule TAKE 1 CAPSULE BY MOUTH EVERY DAY    atorvastatin (LIPITOR) 10 mg tablet Take 1 Tablet by mouth nightly.  levothyroxine (SYNTHROID) 112 mcg tablet Take 1 Tablet by mouth Daily (before breakfast). (Patient taking differently: Take 125 mcg by mouth Daily (before breakfast). )    ergocalciferol (ERGOCALCIFEROL) 1,250 mcg (50,000 unit) capsule Take 1 Capsule by mouth every Monday and Friday. (Patient taking differently: Take 50,000 Units by mouth every seven (7) days. Monday)    esomeprazole (NEXIUM) 40 mg capsule Take 1 Capsule by mouth daily.  folic acid (FOLVITE) 1 mg tablet Take  by mouth daily.  fexofenadine (ALLEGRA) 180 mg tablet Take 180 mg by mouth daily. Seasonally (Patient not taking: Reported on 12/14/2021)    fluticasone propionate (Flonase Allergy Relief) 50 mcg/actuation nasal spray 2 Sprays by Both Nostrils route daily.  naproxen sodium (Aleve) 220 mg tablet Take 220 mg by mouth two (2) times daily (with meals). Hold for procedure    rizatriptan (MAXALT-MLT) 10 mg disintegrating tablet Timothy@[employer] migraine, may repeat in 2h prn, max 2/24h, do not use > 3d/wk    ondansetron hcl (Zofran) 8 mg tablet Take 1 Tab by mouth every eight (8) hours as needed for Nausea (2 months supply).  galcanezumab-gnlm (Emgality Pen) 120 mg/mL injection 1 ML BY SUBCUTANEOUS ROUTE EVERY THIRTY (30) DAYS.  hyoscyamine SL (LEVSIN/SL) 0.125 mg SL tablet 1 Tab by SubLINGual route every four (4) hours as needed for Cramping.  methotrexate (RHEUMATREX) 2.5 mg tablet 10 mg every Wednesday.  hydroxychloroquine (PLAQUENIL) 200 mg tablet Take 200 mg by mouth daily. The surgery is planned for the Right lapidus bunionectomy and 2ND MTP WEIL OSTEOTOMY    . History and physical has been reviewed. The patient has been examined.  There have been no significant clinical changes since the completion of the originally dated History and Physical.  Patient identified by surgeon; surgical site was confirmed by patient and surgeon. The patient is here today for outpatient surgery. I have examined the patient, no changes are noted in the patient's medical status. Necessity for the procedure/care is still present and the history and physical above is current. See the office notes for the full long term history of the problem. Please see the recent office notes for the musculoskeletal examination.     Signed By: Doretha Jones NP     December 16, 2021 9:01 AM

## 2021-12-16 NOTE — ANESTHESIA PREPROCEDURE EVALUATION
Relevant Problems   No relevant active problems       Anesthetic History   No history of anesthetic complications            Review of Systems / Medical History  Patient summary reviewed and pertinent labs reviewed    Pulmonary          Smoker (vapes)         Neuro/Psych         Headaches     Cardiovascular              Hyperlipidemia    Exercise tolerance: >4 METS     GI/Hepatic/Renal     GERD: well controlled           Endo/Other      Hypothyroidism: well controlled  Arthritis     Other Findings              Physical Exam    Airway  Mallampati: II  TM Distance: 4 - 6 cm  Neck ROM: normal range of motion   Mouth opening: Normal     Cardiovascular  Regular rate and rhythm,  S1 and S2 normal,  no murmur, click, rub, or gallop  Rhythm: regular  Rate: normal      Pertinent negatives: No murmur   Dental      Comments: Temp crown   Pulmonary  Breath sounds clear to auscultation               Abdominal  Abdominal exam normal       Other Findings            Anesthetic Plan    ASA: 2  Anesthesia type: total IV anesthesia      Post-op pain plan if not by surgeon: peripheral nerve block single    Induction: Intravenous  Anesthetic plan and risks discussed with: Patient

## 2021-12-16 NOTE — DISCHARGE INSTRUCTIONS
INSTRUCTIONS FOLLOWING FOOT SURGERY    ACTIVITY  Elevate foot. No Ice    1.)No weight bearing. Use crutches or knee walker until seen in follow up appointment      Blood clot prevention:  As instructed by Dr Jerilyn Johnson: Take one 325mg aspirin daily if okay with your medical doctor and you have no GI ulcer. Get up and out of bed frequently. While in bed move the legs as much as possible. DRESSING CARE Keep dry and in place until follow up appointment. Cover with cast bag or plastic bag when showering. Let the office know if dressing gets saturated with water. Don't put anything into the splint to relieve itching etc.     CALL YOUR DOCTOR IF YOU HAVE  Excessive bleeding that does not stop after holding mild pressure over the area. Temperature of 101 degrees or above. Redness, excessive swelling or bruising, and/or green or yellow, smelly discharge from incision. Loss of sensation - cold, white or blue toes. DIET  Day of Surgery: Clear liquids until no nausea or vomiting; then light, bland diet (Baked chicken, plain rice, grits, scrambled egg, toast). Nothing Greasy, fried or spicy today  Advance to regular diet on second day, unless your doctor orders otherwise. PAIN  Take pain medications as directed by your doctor. Call your doctor if pain is NOT relieved by medication. MEDICATION INTERACTION:  During your procedure you potentially received a medication or medications which may reduce the effectiveness of oral contraceptives. Please consider other forms of contraception for 1 month following your procedure if you are currently using oral contraceptives as your primary form of birth control.  In addition to this, we recommend continuing your oral contraceptive as prescribed, unless otherwise instructed by your physician, during this time    After general anesthesia or intravenous sedation, for 24 hours or while taking prescription Narcotics:  · Limit your activities  · A responsible adult needs to be with you for the next 24 hours  · Do not drive and operate hazardous machinery  · Do not make important personal or business decisions  · Do not drink alcoholic beverages  · If you have not urinated within 8 hours after discharge, and you are experiencing discomfort from urinary retention, please go to the nearest ED. · If you have sleep apnea and have a CPAP machine, please use it for all naps and sleeping. · Please use caution when taking narcotics and any of your home medications that may cause drowsiness. *  Please give a list of your current medications to your Primary Care Provider. *  Please update this list whenever your medications are discontinued, doses are      changed, or new medications (including over-the-counter products) are added. *  Please carry medication information at all times in case of emergency situations. These are general instructions for a healthy lifestyle:  No smoking/ No tobacco products/ Avoid exposure to second hand smoke  Surgeon General's Warning:  Quitting smoking now greatly reduces serious risk to your health. Obesity, smoking, and sedentary lifestyle greatly increases your risk for illness  A healthy diet, regular physical exercise & weight monitoring are important for maintaining a healthy lifestyle    You may be retaining fluid if you have a history of heart failure or if you experience any of the following symptoms:  Weight gain of 3 pounds or more overnight or 5 pounds in a week, increased swelling in our hands or feet or shortness of breath while lying flat in bed. Please call your doctor as soon as you notice any of these symptoms; do not wait until your next office visit. Learning About How to Use Crutches  Your Care Instructions  Crutches can help you walk when you have an injured hip, leg, knee, ankle, or foot. Your doctor will tell you how much weight--if any--you can put on your leg. Be sure your crutches fit you.  When you stand up in your normal posture, there should be space for two or three fingers between the top of the crutch and your armpit. When you let your hands hang down, the hand  should be at your wrists. When you put your hands on the hand , your elbows should be slightly bent. To stay safe when using crutches:  · Look straight ahead, not down at your feet. · Clear away small rugs, cords, or anything else that could cause you to trip, slip, or fall. · Be very careful around pets and small children. They can get in your path when you least expect it. · Be sure the rubber tips on your crutches are clean and in good condition to help prevent slipping. · Avoid slick conditions, such as wet floors and snowy or icy driveways. In bad weather, be especially careful on curbs and steps. How to use crutches  Getting ready to walk    1. Bend your elbows slightly. Press the padded top parts of the crutches against your sides, under your armpits. 2. If you have been told not to put any weight on your injured leg, keep that leg bent and off the ground. Walking with crutches    1. Put both crutches about 12 inches in front of you. 2. Put your weight on the handgrips, not on the pads under your arms. (Constant pressure against your underarms can cause numbness.) Swing your body forward. (If you have been told not to put any weight on your injured leg, keep that leg bent and off the ground.)  3. To complete the step, put your weight on the strong leg. 4. Move your crutches about 12 inches in front of you, and start the next step. 5. When you're confident using the crutches, you can move the crutches and your injured leg at the same time. Then push straight down on the crutches as you step past the crutches with your strong leg, as you would in normal walking. 6. Take small steps. 7. Use ramps and elevators when you can. Sitting down    1. To sit, back up to the chair.  Use one hand to hold both crutches by the handgrips, beside your injured leg. With the other hand, hold onto the seat and slowly lower yourself onto the chair. 2. Lay the crutches on the ground near your chair. If you prop them up, they may fall over. Getting up from a chair    1. To get up from a chair,  the crutches and put them in one hand beside your injured leg. 2. Put your weight on the handgrips of the crutches and on your strong leg to stand up. Walking up stairs    1. To go up stairs, step up with your strong leg and then bring the crutches and your injured leg to the upper step. 2. For stairs that have handrails: Put both crutches under the arm opposite the handrail. Use the hand opposite the handrail to hold both crutches by the handgrips. 3. Hold onto the handrail as you go up. Put your strong leg on the step first when you go up. Walking down stairs    1. To go down stairs, put your crutches and injured leg on the lower step. 2. Bring your strong leg to the lower step. This saying may help you remember: \"Up with the good, down with the bad. \"  3. For stairs that have handrails: Put both crutches under the arm opposite the handrail. Use the hand opposite the handrail to hold both crutches by the handgrips. Hold onto the handrail as you go down. Follow the same process you use for stairs: Lead with your crutches and injured leg on the way down.

## 2021-12-16 NOTE — PERIOP NOTES
Pt and  Mary Green given discharge instructions at bedside, both verbalize understanding. All questions answered.

## 2021-12-16 NOTE — ANESTHESIA POSTPROCEDURE EVALUATION
Procedure(s):  RIGHT LAPIDUS BUNIONECTOMY  2ND MTP WEIL OSTEOTOMY.    total IV anesthesia    Anesthesia Post Evaluation      Multimodal analgesia: multimodal analgesia used between 6 hours prior to anesthesia start to PACU discharge  Patient location during evaluation: bedside  Patient participation: complete - patient participated  Level of consciousness: awake and responsive to light touch  Pain management: adequate  Airway patency: patent  Anesthetic complications: no  Cardiovascular status: acceptable, hemodynamically stable, blood pressure returned to baseline and stable  Respiratory status: acceptable, unassisted, spontaneous ventilation and nonlabored ventilation  Hydration status: acceptable  Post anesthesia nausea and vomiting:  controlled  Final Post Anesthesia Temperature Assessment:  Normothermia (36.0-37.5 degrees C)      INITIAL Post-op Vital signs:   Vitals Value Taken Time   /67 12/16/21 1401   Temp 36.4 °C (97.6 °F) 12/16/21 1401   Pulse 63 12/16/21 1401   Resp 15 12/16/21 1401   SpO2 97 % 12/16/21 1401

## 2021-12-16 NOTE — ANESTHESIA PROCEDURE NOTES
Peripheral Block    Start time: 12/16/2021 10:29 AM  End time: 12/16/2021 10:39 AM  Performed by: Elgin Mackay MD  Authorized by: Elgin Mackay MD       Pre-procedure:    Indications: at surgeon's request and post-op pain management    Preanesthetic Checklist: patient identified, risks and benefits discussed, site marked, timeout performed, anesthesia consent given and patient being monitored    Timeout Time: 10:29 EST          Block Type:   Block Type:  Popliteal  Laterality:  Right  Monitoring:  Continuous pulse ox, frequent vital sign checks, heart rate, oxygen and responsive to questions  Injection Technique:  Single shot  Procedures: ultrasound guided    Prep: chlorhexidine    Location:  Lower thigh  Needle Type:  Stimuplex  Needle Gauge:  20 G  Needle Localization:  Ultrasound guidance  Medication Injected:  Ropivacaine (PF) (NAROPIN)(0.5%) 5 mg/mL injection, 20 mL  Med Admin Time: 12/16/2021 10:39 AM    Assessment:  Number of attempts:  1  Injection Assessment:  Incremental injection every 5 mL, local visualized surrounding nerve on ultrasound, negative aspiration for blood, no intravascular symptoms, no paresthesia and ultrasound image on chart  Patient tolerance:  Patient tolerated the procedure well with no immediate complications

## 2021-12-17 PROBLEM — Z12.31 SCREENING MAMMOGRAM FOR BREAST CANCER: Status: RESOLVED | Noted: 2021-11-17 | Resolved: 2021-12-17

## 2022-02-02 ENCOUNTER — HOSPITAL ENCOUNTER (OUTPATIENT)
Dept: PHYSICAL THERAPY | Age: 57
Discharge: HOME | End: 2022-02-02
Payer: COMMERCIAL

## 2022-02-02 DIAGNOSIS — M20.11 HALLUX VALGUS OF RIGHT FOOT: ICD-10-CM

## 2022-02-02 PROCEDURE — 97162 PT EVAL MOD COMPLEX 30 MIN: CPT

## 2022-02-02 PROCEDURE — 97110 THERAPEUTIC EXERCISES: CPT

## 2022-02-02 NOTE — THERAPY EVALUATION
Honey Ambrosio Fig  : 1965  Primary: Nba Barry Rpn  Secondary:  [address] at [facility]  [address], [address], [place]  [phone]   [phone]         OUTPATIENT PHYSICAL THERAPY:Initial Assessment 2022    ICD-10: Treatment Diagnosis: Pain in right foot (M79.671); DIFFICULTY WALKING, NOT ELSEWHERE CLASSIFIED    Precautions/Allergies: non reported   Fall Risk Score: 1 (? 5 = High Risk)  MD Orders: evaluate and treat  TREATMENT PLAN:  Effective Dates: 2022 TO 2022 (90 days). Frequency/Duration: 2 times a week for 90 Day(s) MEDICAL/REFERRING DIAGNOSIS:Hallux valgus of right foot [M20.11]  DATE OF ONSET: surgery date: 21  REFERRING PHYSICIAN:Sanya Jamil MD  RETURN PHYSICIAN APPOINTMENT: did not specify       ASSESSMENT:  Ms. Leesa Armando presents to physical therapy with symptoms post Lapidus and second MTP Weil osteotomy. The examination reveals moderate redness and discoloration around the foot indicative of possible CRPS. She has severe sensitivity to lite touch and moderate stiffness though its hard to accurately assess secondary to sensitivity . The examination is of moderate complexity due to an evolving clinical presentation. Skilled physical therapy is recommended to progress the plan of care in order for the patient to return to full function with minimal symptoms. PROBLEM LIST (Impacting functional limitations):  1. Severe sensitivity to lite touch  2. Moderate stiffness of the big toe and ankle  3. Unable to weight bear secondary to pain  4. Unable to walk or work due to pain INTERVENTIONS PLANNED:  1. Home Exercise Program (HEP)  2. Manual Therapy  3. Therapeutic Exercise/Strengthening  4. Cryotherapy w/ vaso-pneumatic compression  5. Dry needling  6. Modalities including ESTIM, ultrasound      GOALS: (Goals have been discussed and agreed upon with patient.)  SHORT-TERM FUNCTIONAL GOALS: Time Frame: 2-4 wks  1.   Patient will report 25-50% reduction in overall symptoms  2. Patient will be compliant with HEP and plan of care  3. Patient will report minimal pain 0-3/10 to lite touch of the ankle   4. Patient will improve on the FAAM by 8-10 points  5. Patient will tolerate wearing a normal shoe   DISCHARGE GOALS: Time Frame: 6-8 wk  1. Patient will report 50-75% reduction in overall symptoms in order to be able to have full function with decreased symptoms  2. Patient will report feeling comfortable progressing their own plan of care with the home program prescribed  3. Patient will report being able to work with minimal limitations and pain (0-2/10)  4. Patient will score 50 or higher on the FAAM in order to demonstrate improved function with less pain    Rehabilitation Potential For Stated Goals: K[address] Fig therapy, I certify that the treatment plan above will be carried out by a therapist or under their direction. Thank you for this referral,  Andrez Melton PT,DPT    Referring Physician Signature: Letha Rothman MD _____________________________________________________ Date: __________________________________          HISTORY:   History of Present Injury/Illness (Reason for Referral): reports that she has had pain off and on for about a year. She had surgery on 12-16-21. She had initially lots of discoloration and sensitivity of the foot. The pain is significant. Past Medical History/Comorbidities:   Past Medical History:   Diagnosis Date    Bilateral occipital neuralgia 10/16/2018    Cervical migraine syndrome 8/6/2018    Chronic inflammatory arthritis     hands, feet, ankles, knees    Current smoker     Depression     Environmental and seasonal allergies     GERD (gastroesophageal reflux disease)     managed with medication-patient states she has to remain slightly inclined when she sleeps to prevent GERD.  Heart murmur     history per patient-\"I was told years ago that I had a murmur. \" Per patient last echo over 10 years ago. No murmur ascultated at PAT appointment. 12/14/21 states no murmur heard at last PAT appointment and no murmur heard since    Hiatal hernia     High cholesterol 02/15/2020    History of anemia     Hgb 13.5 11/17/21    History of chicken pox     History of migraine headaches     PRN medication-followed by PCP     History of TMJ disorder     Hypercholesterolemia     managed with medication     Intractable migraine without aura and with status migrainosus 6/27/2017    Measles     Mumps     Neck pain 6/30/2020    Nicotine vapor product user     Occipital neuralgia of left side 10/16/2018    Palpitations     Thyroid disease     hypo-managed with medication     Trapezius muscle spasm 6/7/2019    Unilateral headache 3/27/2018     Social History/Living Environment: lives in a single family home with her    Prior Level of Function/Work/Activity:independent   Current Medications:   Aleve, tylenol, cbd oil, diclofenec cream    Current Outpatient Medications:     HYDROcodone-acetaminophen (NORCO) 7.5-325 mg per tablet, Take 1 Tablet by mouth every six (6) hours as needed for Pain for up to 7 days. Max Daily Amount: 4 Tablets. , Disp: 30 Tablet, Rfl: 0    tiZANidine (ZANAFLEX) 4 mg tablet, 1 qhs, Disp: 90 Tablet, Rfl: 2    rizatriptan (MAXALT-MLT) 10 mg disintegrating tablet, TAKE 1 TABLET BY MOUTH AT ONSET OF MIGRAINE *MAY REPEAT IN 2 HOURS IF NEEDED*MAX 2/24 HRS AND 3/WK, Disp: 12 Tablet, Rfl: 5    cephALEXin (KEFLEX) 500 mg capsule, Take 1 Capsule by mouth four (4) times daily. , Disp: 12 Capsule, Rfl: 0    loratadine (CLARITIN) 10 mg tablet, Take 10 mg by mouth daily. , Disp: , Rfl:     hydrOXYzine HCL (ATARAX) 25 mg tablet, Take  by mouth as needed. , Disp: , Rfl:     diclofenac (VOLTAREN) 1 % gel, Apply  to affected area four (4) times daily. (Patient taking differently: Apply  to affected area four (4) times daily.  Hold for procedure), Disp: 100 g, Rfl: 5   montelukast (SINGULAIR) 10 mg tablet, Take 1 Tablet by mouth daily. (Patient taking differently: Take 10 mg by mouth nightly.), Disp: 90 Tablet, Rfl: 1    traZODone (DESYREL) 100 mg tablet, Take 1 Tablet by mouth nightly. (Patient taking differently: Take 50 mg by mouth nightly.), Disp: 90 Tablet, Rfl: 1    FLUoxetine (PROzac) 20 mg capsule, TAKE 1 CAPSULE BY MOUTH EVERY DAY, Disp: 90 Capsule, Rfl: 1    atorvastatin (LIPITOR) 10 mg tablet, Take 1 Tablet by mouth nightly., Disp: 90 Tablet, Rfl: 1    levothyroxine (SYNTHROID) 112 mcg tablet, Take 1 Tablet by mouth Daily (before breakfast). (Patient taking differently: Take 125 mcg by mouth Daily (before breakfast). ), Disp: 90 Tablet, Rfl: 1    ergocalciferol (ERGOCALCIFEROL) 1,250 mcg (50,000 unit) capsule, Take 1 Capsule by mouth every Monday and Friday. (Patient taking differently: Take 50,000 Units by mouth every seven (7) days. Monday), Disp: 24 Capsule, Rfl: 3    esomeprazole (NEXIUM) 40 mg capsule, Take 1 Capsule by mouth daily. , Disp: 90 Capsule, Rfl: 1    folic acid (FOLVITE) 1 mg tablet, Take  by mouth daily. , Disp: , Rfl:     fexofenadine (ALLEGRA) 180 mg tablet, Take 180 mg by mouth daily. Seasonally (Patient not taking: Reported on 12/14/2021), Disp: , Rfl:     fluticasone propionate (Flonase Allergy Relief) 50 mcg/actuation nasal spray, 2 Sprays by Both Nostrils route daily. , Disp: , Rfl:     ondansetron hcl (Zofran) 8 mg tablet, Take 1 Tab by mouth every eight (8) hours as needed for Nausea (2 months supply). , Disp: 30 Tab, Rfl: 3    galcanezumab-gnlm (Emgality Pen) 120 mg/mL injection, 1 ML BY SUBCUTANEOUS ROUTE EVERY THIRTY (30) DAYS., Disp: 1 Syringe, Rfl: 11    hyoscyamine SL (LEVSIN/SL) 0.125 mg SL tablet, 1 Tab by SubLINGual route every four (4) hours as needed for Cramping., Disp: 30 Tab, Rfl: 2    hydroxychloroquine (PLAQUENIL) 200 mg tablet, Take 200 mg by mouth daily. , Disp: , Rfl:    Date Last Reviewed: 2/8/2022 Ambulatory/Rehab Services H2 Model Falls Risk Assessment    Risk Factors:       (1)  Any administered benzodiazepines Ability to Rise from Chair:       (1)  Pushes up, successful in one attempt    Falls Prevention Plan: Mobility Assistance Device (specify):  scooter   Total: (5 or greater = High Risk): 1    ©2010 [facility] [employer]. [facility] [employer] Patent #[id number]. Federal Law prohibits the replication, distribution or use without written permission from [facility] [employer]      Number of Personal Factors/Comorbidities that affect the Plan of Care: 1-2: MODERATE COMPLEXITY   EXAMINATION:   (Abbreviations: P-pain, NP- no pain, wnl-within normal limits)  Observation/Orthostatic Postural Assessment:    Relaxed standing posture:  · Moderate discoloration around the foot  · Incisions look well healed    Palpation/tone/tissue texture:    Hypersensitive to lite touch        Foot Exam Date:  2/8/2022       Left Right   Talocrural Joint: wnl Moderate stifness   Rear foot: (neutral to relaxed: 4-6                      deg-normal) wnl Moderate stiffness   Mid-foot (navicular drop, <7-normal) - n/a   Forefoot: (position, mobility) - n/a   First ray position/hallux limitus test: - n/a   Windlass test: - n/a   Foot intrinsic strength:  - n/a       ROM:      Ankle ROM Date:  2/8/2022       Right Left   DF 0 deg 10 deg   PF Limited at end range wnl   Inversion N/a due to pain wnl   Eversion N/a due to pain wnl           Strength:      Foot and ankle Strength Date:  2/8/2022       Right Left   DF N/a due to pain n/a   PF - n/a   Inversion - n/a   Eversion - n/a                Special Tests: not done at this time  Neurological Screen:   Myotomes: n/a     Dermatomes: hypersenstive to lite touch around the right ankle, predomintely around the incision and medial longitudinal arch         Reflexes: -         Neural Tension Tests: tinel sign over tibial nerve (-)  Functional Mobility:    · Double leg squat: n/a  · Gait Pattern: unable to walk at this time due to pain  Balance:  Single leg   L: n/a  R:n/a     Body Structures Involved:  1. Nerves  2. Joints  3. Muscles  4. Ligaments Body Functions Affected:  1. Sensory/Pain  2. Neuromusculoskeletal  3. Movement Related  4. Skin Related Activities and Participation Affected:  1. General Tasks and Demands  2. Mobility  3. Self Care  4. Domestic Life  5. Interpersonal Interactions and Relationships  6. Community, Social and [place]   Number of elements that affect the Plan of Care: 4+: HIGH COMPLEXITY   CLINICAL PRESENTATION:   Presentation: Evolving clinical presentation with changing clinical characteristics: MODERATE COMPLEXITY   CLINICAL DECISION MAKING:   Outcome Measure: Tool Used: PT/OT FOOT AND ANKLE ABILITY MEASURE  Score:  Initial: 4/84 Most Recent: X (Date: -- )   Interpretation of Score: For the \"Activities of Daily Living\", there are 21 questions each scored on a 5 point scale with 0 representing \"Unable to do\" and 4 representing \"No difficulty\". The lower the score, the greater the functional disability. 84/84 represents no disability. Minimal detectable change is 5.7 points. With the addition of the 8 questions in the \"Sports Subscale,\" there are 29 questions, each scored on a 5 point scale with 0 representing \"Unable to do\" and 4 representing \"No difficulty\". The lower the score, the greater the functional disability. 116/116 represents no disability. Minimal detectable change is 12.3 points. Medical Necessity:   · Patient is expected to demonstrate progress in strength, range of motion and symptom levels to return to full function. Reason for Services/Other Comments:  · Patient continues to require skilled intervention due to ongoing symptoms.    Use of outcome tool(s) and clinical judgement create a POC that gives a: Questionable prediction of patient's progress: MODERATE COMPLEXITY       · Pain: Initial:    8/10 Post Session:  8/10 ·   Compliance with Program/Exercises: Will assess as treatment progresses. · Recommendations/Intent for next treatment session: \"Next visit will focus on progressing the patients plan of care\".     Future Appointments   Date Time Provider Humble Duran   2/8/2022 10:30 AM Dina Marcelo PT, DPT [facility]   2/10/2022  3:30 PM Dina Marcelo PT, DPT [facility]   2/14/2022 11:45 AM [facility] ROOM 1 [facility]   2/15/2022  2:30 PM Dina Marcelo PT, DPT [facility]   2/17/2022 10:30 AM Dina Marcelo PT, DPT [facility]   2/22/2022 10:45 AM Dina Marcelo PT, DPT [facility]   2/23/2022 10:00 AM Graciela Gutierrez MD [facility]   2/24/2022 10:45 AM Dina Marcelo PT, DPT [facility]   5/18/2022  9:00 AM Vandana Sandra MD [facility]     Total Treatment Duration: 20 min, evaluation 35 min  PT Patient Time In/Time Out  Time In: 1520  Time Out: 1615      Tati Jane PT, DPT

## 2022-02-02 NOTE — PROGRESS NOTES
Anjelica Rulo Fig  : 1965  Primary: Frank Barry Rpn  Secondary:  [address] at [facility]  [address], [address], [place] [address]  [phone]   [phone]        OUTPATIENT PHYSICAL THERAPY: Daily Treatment Note  2022    1    ICD-10: Treatment Diagnosis: Pain in right foot (M79.671); DIFFICULTY WALKING, NOT ELSEWHERE CLASSIFIED    Precautions/Allergies: non reported   Fall Risk Score: 1 (? 5 = High Risk)  MD Orders: evaluate and treat  TREATMENT PLAN:  Effective Dates: 2022 TO 2022 (90 days). Frequency/Duration: 2 times a week for 90 Day(s) MEDICAL/REFERRING DIAGNOSIS:Hallux valgus of right foot [M20.11]  DATE OF ONSET: surgery date: 21  REFERRING PHYSICIAN:Omid Jamil MD  RETURN PHYSICIAN APPOINTMENT: did not specify        Pre-treatment Symptoms/Complaints:  Reports that she has had moderate to severe pain and has been unable to weight bear on the foot. Pain: Initial:   Pain Intensity 1: 8  Pain Location 1: Foot /10 Post Session:  8/10   Medications Last Reviewed:  2022  Updated Objective Findings:  See evaluation    TREATMENT:   Therapeutic Exercise: (20 min) (Done in order to improve mobility, strength, function and overall understanding of condition)   Date:  22   Activity/Exercise Parameters   Education Discussed HEP, plan of care  Discussed wearing a shoe or sandal for at least 5 min/day   Desensitization  · Discussed rubbing different materials across her foot  · Sitting, placing pressure into her foot   Ankle ROM 10x   Active toe movement 10x, discussed to visualize and think about the motion   Nerve glide In supine,10x   SLR 10x   Side-lying hip abd 10x     Manual Therapy: (-) (Done to improve mobility, reduce tone, guarding and pain)  ·      Modalities: (-)  [employer] Portal  Treatment/Session Summary:    · Response to Treatment: Ms. Nicole Dangelo seems to be suffering from CRPS like symptoms.  Discussed desensitization process and weaning her out of the boot as soon as possible. · Communication/Consultation:  None today  · Equipment provided today:  None today  · Recommendations/Intent for next treatment session: Next visit will focus on progressing established plan of care.   Total Treatment Billable Duration:  20 min, evaluation 35 min    PT Patient Time In/Time Out  Time In: 1520  Time Out: Pikk 20, PT, DPT

## 2022-02-08 ENCOUNTER — HOSPITAL ENCOUNTER (OUTPATIENT)
Dept: PHYSICAL THERAPY | Age: 57
Discharge: HOME | End: 2022-02-08
Payer: COMMERCIAL

## 2022-02-08 PROCEDURE — 97110 THERAPEUTIC EXERCISES: CPT

## 2022-02-08 PROCEDURE — 97140 MANUAL THERAPY 1/> REGIONS: CPT

## 2022-02-08 NOTE — PROGRESS NOTES
Paulo Mckeon Fig  : 1965  Primary: Didier Barry Rpn  Secondary:  [address] at [facility]  [address], [address], [place] [address]  [phone]   [phone]        OUTPATIENT PHYSICAL THERAPY: Daily Treatment Note  2022    2    ICD-10: Treatment Diagnosis: Pain in right foot (M79.671); DIFFICULTY WALKING, NOT ELSEWHERE CLASSIFIED    Precautions/Allergies: non reported   Fall Risk Score: 1 (? 5 = High Risk)  MD Orders: evaluate and treat  TREATMENT PLAN:  Effective Dates: 2022 TO 2022 (90 days).   Frequency/Duration: 2 times a week for 90 Day(s) MEDICAL/REFERRING DIAGNOSIS:Hallux valgus of right foot [M20.11]  DATE OF ONSET: surgery date: 21  REFERRING PHYSICIAN:Сергей Jamil MD  RETURN PHYSICIAN APPOINTMENT: did not specify        Pre-treatment Symptoms/Complaints:  Reports that she her foot sensitivity is much better but she still has a hard time bearing weight on the leg  Pain: Initial:   Pain Intensity 1: 3  Pain Location 1: Foot /10 Post Session:  3/10   Medications Last Reviewed:  2022  Updated Objective Findings:  See evaluation    TREATMENT:   Therapeutic Exercise: (30 min) (Done in order to improve mobility, strength, function and overall understanding of condition)   Date:  22 Date  22   Activity/Exercise Parameters    Education Discussed HEP, plan of care  Discussed wearing a shoe or sandal for at least 5 min/day · Discussed HEP  · Taped 2nd MTP into PF w/ teardrop technique   Nu step  10 min   Desensitization  · Discussed rubbing different materials across her foot  · Sitting, placing pressure into her foot HEP   Ankle ROM 10x 10x, all directions   Active toe movement 10x, discussed to visualize and think about the motion    Nerve glide In supine,10x In supine   SLR 10x HEP   Side-lying hip abd 10x HEP   DF stretch  · In supine, 20 sec hold,2x  · Sitting, heel slide, 10x   Toe exercises  · Alternating lesser and big toe flexion/extension  · Press down in sitting, 10x   PF  In sitting, worked on moving through toes, 20x     Manual Therapy: (15 min) (Done to improve mobility, reduce tone, guarding and pain)  ·  scar tissue mobilization  · 1st MTP flexion/extension  · 2nd MTP flexion/extension    Modalities: (-)  [employer] Portal  Treatment/Session Summary:    · Response to Treatment: Ms. Amparo Villaseñor is doing much better with foot sensitivity. We were able to mobilize her toe joints and started working on active ROM and strengthening. Discussed progression towards weight bearing. · Communication/Consultation:  None today  · Equipment provided today:  None today  · Recommendations/Intent for next treatment session: Next visit will focus on progressing established plan of care.   Total Treatment Billable Duration:  Manual 15 min, TE-30 min    PT Patient Time In/Time Out  Time In: 1030  Time Out: 1115    Tura Collet, PT, DPT

## 2022-02-10 ENCOUNTER — HOSPITAL ENCOUNTER (OUTPATIENT)
Dept: PHYSICAL THERAPY | Age: 57
Discharge: HOME | End: 2022-02-10
Payer: COMMERCIAL

## 2022-02-10 NOTE — PROGRESS NOTES
Nathan Ford Fig  : 1965  Primary: Pamela Cruz Jhonny Rpn  Secondary:  [address] at [address]  [address], [address], [place] [address]  [phone]   [phone]        OUTPATIENT DAILY NOTE    NAME/AGE/GENDER: Serge Sorenson is a 59 y.o. female. DATE: 2/10/2022    Ms. Fig CANCELED for today's appointment due to a migraine.     Chris Stallworth, PT, DPT

## 2022-02-14 ENCOUNTER — HOSPITAL ENCOUNTER (OUTPATIENT)
Dept: MAMMOGRAPHY | Age: 57
Discharge: HOME | End: 2022-02-14
Attending: FAMILY MEDICINE

## 2022-02-14 DIAGNOSIS — Z12.31 SCREENING MAMMOGRAM FOR BREAST CANCER: ICD-10-CM

## 2022-02-15 ENCOUNTER — HOSPITAL ENCOUNTER (OUTPATIENT)
Dept: PHYSICAL THERAPY | Age: 57
Discharge: HOME | End: 2022-02-15
Payer: COMMERCIAL

## 2022-02-15 PROCEDURE — 97110 THERAPEUTIC EXERCISES: CPT

## 2022-02-15 PROCEDURE — 97140 MANUAL THERAPY 1/> REGIONS: CPT

## 2022-02-15 NOTE — PROGRESS NOTES
Jania Vito Fig  : 1965  Primary: Jarvis Barry Rpn  Secondary:  [address] at [facility]  [address], [address], [place] [address]  [phone]   [phone]        OUTPATIENT PHYSICAL THERAPY: Daily Treatment Note  2/15/2022    3    ICD-10: Treatment Diagnosis: Pain in right foot (M79.671); DIFFICULTY WALKING, NOT ELSEWHERE CLASSIFIED    Precautions/Allergies: non reported   Fall Risk Score: 1 (? 5 = High Risk)  MD Orders: evaluate and treat  TREATMENT PLAN:  Effective Dates: 2022 TO 2022 (90 days). Frequency/Duration: 2 times a week for 90 Day(s) MEDICAL/REFERRING DIAGNOSIS:Hallux valgus of right foot [M20.11]  DATE OF ONSET: surgery date: 21  REFERRING PHYSICIAN:Sunshine Jamil MD  RETURN PHYSICIAN APPOINTMENT: did not specify        Pre-treatment Symptoms/Complaints:  Reports that she had a lot of pain after the last session that took several days to calm down.    Pain: Initial:   Pain Intensity 1: 5  Pain Location 1: Foot /10 Post Session:  3/10   Medications Last Reviewed:  2/15/2022  Updated Objective Findings:  See evaluation    TREATMENT:   Therapeutic Exercise: (25 min) (Done in order to improve mobility, strength, function and overall understanding of condition)   Date:  22 Date  22 Date  2-15-22   Activity/Exercise Parameters     Education Discussed HEP, plan of care  Discussed wearing a shoe or sandal for at least 5 min/day · Discussed HEP  · Taped 2nd MTP into PF w/ teardrop technique · Discussed HEP  · Taped 2nd MTP into PF w/ teardrop technique   Nu step  10 min    Desensitization  · Discussed rubbing different materials across her foot  · Sitting, placing pressure into her foot HEP Reviewed    Ankle ROM 10x 10x, all directions 10x, all directions   Active toe movement 10x, discussed to visualize and think about the motion     Nerve glide In supine,10x In supine In supine, gentle   SLR 10x HEP    Side-lying hip abd 10x HEP    DF stretch · In supine, 20 sec hold,2x  · Sitting, heel slide, 10x · Sitting, heel slide, 10x   Toe exercises  · Alternating lesser and big toe flexion/extension  · Press down in sitting, 10x · Alternating lesser and big toe flexion/extension (used mirror imaging)  · Toe flexion and curl, 10x, 3 sets   PF  In sitting, worked on moving through toes, 20x -   Weight shifts   10x,    squats   Partial, 10x, focused on knees over toes     Manual Therapy: (15 min) (Done to improve mobility, reduce tone, guarding and pain)  ·  scar tissue mobilization  · 1st MTP flexion/extension  · 2nd MTP flexion/extension    Modalities: (-)  [employer] Portal  Treatment/Session Summary:    · Response to Treatment: Ms. Jeovanny Nicole seemed to tolerate the session well. We are still trying to figure out the correct dose of exercise without causing a flare up. We will continue to progress her plan of care. · Communication/Consultation:  None today  · Equipment provided today:  None today  · Recommendations/Intent for next treatment session: Next visit will focus on progressing established plan of care.   Total Treatment Billable Duration:  Manual 15 min, TE-25 min    PT Patient Time In/Time Out  Time In: 5061  Time Out: 1618 Roxy Reveles Se, PT, DPT

## 2022-02-17 ENCOUNTER — APPOINTMENT (OUTPATIENT)
Dept: PHYSICAL THERAPY | Age: 57
End: 2022-02-17
Payer: COMMERCIAL

## 2022-02-22 ENCOUNTER — HOSPITAL ENCOUNTER (OUTPATIENT)
Dept: PHYSICAL THERAPY | Age: 57
Discharge: HOME | End: 2022-02-22
Payer: COMMERCIAL

## 2022-02-22 PROCEDURE — 97110 THERAPEUTIC EXERCISES: CPT

## 2022-02-22 NOTE — PROGRESS NOTES
Jayla Avila Fig  : 1965  Primary: Voncille Sicard [employer]  Secondary:  [address] at [facility]  [address], [address], [place] [address]  [phone]   [phone]        OUTPATIENT PHYSICAL THERAPY: Daily Treatment Note  2022    4    ICD-10: Treatment Diagnosis: Pain in right foot (M79.671); DIFFICULTY WALKING, NOT ELSEWHERE CLASSIFIED    Precautions/Allergies: non reported   Fall Risk Score: 1 (? 5 = High Risk)  MD Orders: evaluate and treat  TREATMENT PLAN:  Effective Dates: 2022 TO 2022 (90 days). Frequency/Duration: 2 times a week for 90 Day(s) MEDICAL/REFERRING DIAGNOSIS:Hallux valgus of right foot [M20.11]  DATE OF ONSET: surgery date: 21  REFERRING PHYSICIAN:Delmy Jamil MD  RETURN PHYSICIAN APPOINTMENT: did not specify        Pre-treatment Symptoms/Complaints:  Reports that she had a busy wknd with driving and having to walk around which caused her foot to hurt.   Pain: Initial:   Pain Intensity 1: 6  Pain Location 1: Foot /10 Post Session:  n/a   Medications Last Reviewed:  2022  Updated Objective Findings:  See evaluation    TREATMENT:   Therapeutic Exercise: (40 min) (Done in order to improve mobility, strength, function and overall understanding of condition)   Date  22 Date  2-15-22 Date  22   Activity/Exercise      Education · Discussed HEP  · Taped 2nd MTP into PF w/ teardrop technique · Discussed HEP  · Taped 2nd MTP into PF w/ teardrop technique · Discussed HEP   Desensitization  HEP Reviewed  reviewed   Ankle ROM 10x, all directions 10x, all directions 10x, all directions, mirror feedback used   Nerve glide In supine In supine, gentle In supine, gentle   SLR HEP  2#, 10x   Side-lying hip abd HEP  2#,10x   DF stretch · In supine, 20 sec hold,2x  · Sitting, heel slide, 10x · Sitting, heel slide, 10x -   Toe exercises · Alternating lesser and big toe flexion/extension  · Press down in sitting, 10x · Alternating lesser and big toe flexion/extension (used mirror imaging)  · Toe flexion and curl, 10x, 3 sets Active toe motion with foot at 0 deg DF: 30 sec,3x, mirror feedback used   PF In sitting, worked on moving through toes, 20x - -   Weight shifts  10x,  -   squats  Partial, 10x, focused on knees over toes -   Diaphragmatic breathing   5 min, feedback provided, in supine   Thoracic ROM   Rotation, SB and extension, flexion, done with breathing for relaxation     Manual Therapy: (-min) (Done to improve mobility, reduce tone, guarding and pain)  (NOT DONE TODAY)  ·  scar tissue mobilization  · 1st MTP flexion/extension  · 2nd MTP flexion/extension    Modalities: (done to relax muscle tone and sensitivity )  Pre mod at the foot, intensity 27, 8 min  [facility]  Treatment/Session Summary:    · Response to Treatment: Ms. Michael Murry seemed to tolerate the session well. We are still trying to figure out the correct dose of exercise without causing a flare up. We will continue to progress her plan of care. · Communication/Consultation:  None today  · Equipment provided today:  None today  · Recommendations/Intent for next treatment session: Next visit will focus on progressing established plan of care.   Total Treatment Billable Duration:  TE-40  min    PT Patient Time In/Time Out  Time In: 1050  Time Out: Donny Brownlee PT, DPT

## 2022-02-24 ENCOUNTER — APPOINTMENT (OUTPATIENT)
Dept: PHYSICAL THERAPY | Age: 57
End: 2022-02-24
Payer: COMMERCIAL

## 2022-03-18 PROBLEM — M25.561 ACUTE PAIN OF RIGHT KNEE: Status: ACTIVE | Noted: 2019-12-03

## 2022-03-18 PROBLEM — R61 NIGHT SWEATS: Status: ACTIVE | Noted: 2018-04-18

## 2022-03-18 PROBLEM — E55.9 AVITAMINOSIS D: Status: ACTIVE | Noted: 2021-11-17

## 2022-03-18 PROBLEM — D50.9 IRON DEFICIENCY ANEMIA: Status: ACTIVE | Noted: 2021-11-17

## 2022-03-18 PROBLEM — R06.00 DYSPNEA: Status: ACTIVE | Noted: 2018-11-02

## 2022-03-18 PROBLEM — T78.2XXA ANAPHYLACTIC SYNDROME: Status: ACTIVE | Noted: 2021-03-31

## 2022-03-18 PROBLEM — M19.90 ARTHRITIS: Status: ACTIVE | Noted: 2017-06-27

## 2022-03-18 PROBLEM — R10.13 EPIGASTRIC PAIN: Status: ACTIVE | Noted: 2021-06-28

## 2022-03-18 PROBLEM — R51.9 UNILATERAL HEADACHE: Status: ACTIVE | Noted: 2018-03-27

## 2022-03-18 PROBLEM — M54.81 BILATERAL OCCIPITAL NEURALGIA: Status: ACTIVE | Noted: 2018-10-16

## 2022-03-19 PROBLEM — E86.0 DEHYDRATION: Status: ACTIVE | Noted: 2021-01-06

## 2022-03-19 PROBLEM — G43.809 CERVICAL MIGRAINE SYNDROME: Status: ACTIVE | Noted: 2018-08-06

## 2022-03-19 PROBLEM — M06.9 RHEUMATOID ARTHRITIS INVOLVING BOTH HANDS (HCC): Status: ACTIVE | Noted: 2019-12-03

## 2022-03-19 PROBLEM — K59.09 OTHER CONSTIPATION: Status: ACTIVE | Noted: 2021-01-06

## 2022-03-19 PROBLEM — G89.29 CHRONIC PAIN OF RIGHT KNEE: Status: ACTIVE | Noted: 2020-05-28

## 2022-03-19 PROBLEM — M25.561 CHRONIC PAIN OF RIGHT KNEE: Status: ACTIVE | Noted: 2020-05-28

## 2022-03-19 PROBLEM — F33.41 MDD (MAJOR DEPRESSIVE DISORDER), RECURRENT, IN PARTIAL REMISSION (HCC): Status: ACTIVE | Noted: 2018-01-02

## 2022-03-19 PROBLEM — Z71.3 DIETARY COUNSELING: Status: ACTIVE | Noted: 2020-05-28

## 2022-03-19 PROBLEM — M19.041 OSTEOARTHRITIS OF BOTH HANDS: Status: ACTIVE | Noted: 2017-06-27

## 2022-03-19 PROBLEM — M79.671 RIGHT FOOT PAIN: Status: ACTIVE | Noted: 2021-11-17

## 2022-03-19 PROBLEM — M19.042 OSTEOARTHRITIS OF BOTH HANDS: Status: ACTIVE | Noted: 2017-06-27

## 2022-03-19 PROBLEM — Z91.89 AT RISK FOR PROLONGED QT INTERVAL SYNDROME: Status: ACTIVE | Noted: 2021-03-10

## 2022-03-19 PROBLEM — M22.41 CHONDROMALACIA OF RIGHT PATELLA: Status: ACTIVE | Noted: 2020-05-28

## 2022-03-19 PROBLEM — J30.9 ALLERGIC RHINITIS: Status: ACTIVE | Noted: 2019-12-03

## 2022-03-19 PROBLEM — J41.0 SIMPLE CHRONIC BRONCHITIS (HCC): Status: ACTIVE | Noted: 2018-04-18

## 2022-03-19 PROBLEM — M17.11 OSTEOARTHRITIS OF RIGHT KNEE: Status: ACTIVE | Noted: 2020-02-15

## 2022-03-19 PROBLEM — L82.1 SEBORRHEIC KERATOSES: Status: ACTIVE | Noted: 2021-03-10

## 2022-03-19 PROBLEM — D17.1 LIPOMA OF TORSO: Status: ACTIVE | Noted: 2017-09-14

## 2022-03-19 PROBLEM — R94.31 ABNORMAL EKG: Status: ACTIVE | Noted: 2021-03-10

## 2022-03-19 PROBLEM — M79.89 BILATERAL HAND SWELLING: Status: ACTIVE | Noted: 2018-10-23

## 2022-03-19 PROBLEM — E78.5 HYPERLIPIDEMIA: Status: ACTIVE | Noted: 2017-06-27

## 2022-03-19 PROBLEM — M54.2 NECK PAIN: Status: ACTIVE | Noted: 2020-06-30

## 2022-03-19 PROBLEM — R68.84 JAW PAIN: Status: ACTIVE | Noted: 2018-01-02

## 2022-03-19 PROBLEM — Z91.030 BEE STING ALLERGY: Status: ACTIVE | Noted: 2019-12-03

## 2022-03-19 PROBLEM — A08.4 STOMACH FLU: Status: ACTIVE | Noted: 2021-01-06

## 2022-03-20 PROBLEM — M62.838 TRAPEZIUS MUSCLE SPASM: Status: ACTIVE | Noted: 2019-06-07

## 2022-03-20 PROBLEM — G43.011 INTRACTABLE MIGRAINE WITHOUT AURA AND WITH STATUS MIGRAINOSUS: Status: ACTIVE | Noted: 2017-06-27

## 2022-03-20 PROBLEM — R53.82 CHRONIC FATIGUE: Status: ACTIVE | Noted: 2021-06-28

## 2022-03-20 PROBLEM — E03.9 ACQUIRED HYPOTHYROIDISM: Status: ACTIVE | Noted: 2017-06-27

## 2022-03-20 PROBLEM — F17.211 CIGARETTE NICOTINE DEPENDENCE IN REMISSION: Status: ACTIVE | Noted: 2018-01-02

## 2022-03-20 PROBLEM — K21.9 GASTROESOPHAGEAL REFLUX DISEASE WITHOUT ESOPHAGITIS: Status: ACTIVE | Noted: 2018-01-02

## 2022-06-23 RX ORDER — TIZANIDINE 4 MG/1
4 TABLET ORAL EVERY EVENING
Qty: 90 TABLET | Refills: 1 | Status: SHIPPED | OUTPATIENT
Start: 2022-06-23

## 2022-06-23 RX ORDER — ONDANSETRON HYDROCHLORIDE 8 MG/1
8 TABLET, FILM COATED ORAL EVERY 8 HOURS PRN
Qty: 30 TABLET | Refills: 1 | Status: SHIPPED | OUTPATIENT
Start: 2022-06-23

## 2022-06-23 RX ORDER — RIZATRIPTAN BENZOATE 10 MG/1
TABLET, ORALLY DISINTEGRATING ORAL
Qty: 30 TABLET | Refills: 1 | Status: SHIPPED | OUTPATIENT
Start: 2022-06-23 | End: 2022-08-18

## 2022-07-06 DIAGNOSIS — E55.9 AVITAMINOSIS D: ICD-10-CM

## 2022-07-06 DIAGNOSIS — F33.41 MDD (MAJOR DEPRESSIVE DISORDER), RECURRENT, IN PARTIAL REMISSION (HCC): Primary | ICD-10-CM

## 2022-07-06 DIAGNOSIS — E78.5 HYPERLIPIDEMIA, UNSPECIFIED HYPERLIPIDEMIA TYPE: ICD-10-CM

## 2022-07-06 RX ORDER — ERGOCALCIFEROL (VITAMIN D2) 1250 MCG
50000 CAPSULE ORAL
Qty: 12 CAPSULE | Refills: 1 | Status: SHIPPED | OUTPATIENT
Start: 2022-07-06

## 2022-07-06 RX ORDER — FLUOXETINE HYDROCHLORIDE 40 MG/1
40 CAPSULE ORAL DAILY
Qty: 90 CAPSULE | Refills: 1 | Status: SHIPPED | OUTPATIENT
Start: 2022-07-06

## 2022-07-06 RX ORDER — ATORVASTATIN CALCIUM 10 MG/1
10 TABLET, FILM COATED ORAL DAILY
Qty: 90 TABLET | Refills: 1 | Status: SHIPPED | OUTPATIENT
Start: 2022-07-06

## 2022-07-12 ENCOUNTER — OFFICE VISIT (OUTPATIENT)
Dept: FAMILY MEDICINE CLINIC | Facility: CLINIC | Age: 57
End: 2022-07-12
Payer: COMMERCIAL

## 2022-07-12 VITALS — DIASTOLIC BLOOD PRESSURE: 70 MMHG | BODY MASS INDEX: 20.18 KG/M2 | HEIGHT: 66 IN | SYSTOLIC BLOOD PRESSURE: 110 MMHG

## 2022-07-12 DIAGNOSIS — K63.5 POLYP OF COLON, UNSPECIFIED PART OF COLON, UNSPECIFIED TYPE: ICD-10-CM

## 2022-07-12 DIAGNOSIS — E03.9 ACQUIRED HYPOTHYROIDISM: ICD-10-CM

## 2022-07-12 DIAGNOSIS — K29.00 ACUTE GASTRITIS, PRESENCE OF BLEEDING UNSPECIFIED, UNSPECIFIED GASTRITIS TYPE: Primary | ICD-10-CM

## 2022-07-12 DIAGNOSIS — K29.00 ACUTE GASTRITIS, PRESENCE OF BLEEDING UNSPECIFIED, UNSPECIFIED GASTRITIS TYPE: ICD-10-CM

## 2022-07-12 LAB
ALBUMIN SERPL-MCNC: 4.5 G/DL (ref 3.5–5)
ALBUMIN/GLOB SERPL: 2 {RATIO} (ref 1.2–3.5)
ALP SERPL-CCNC: 88 U/L (ref 50–136)
ALT SERPL-CCNC: 16 U/L (ref 12–65)
ANION GAP SERPL CALC-SCNC: 5 MMOL/L (ref 7–16)
AST SERPL-CCNC: 14 U/L (ref 15–37)
BILIRUB SERPL-MCNC: 0.7 MG/DL (ref 0.2–1.1)
BUN SERPL-MCNC: 9 MG/DL (ref 6–23)
CALCIUM SERPL-MCNC: 9.7 MG/DL (ref 8.3–10.4)
CHLORIDE SERPL-SCNC: 107 MMOL/L (ref 98–107)
CO2 SERPL-SCNC: 29 MMOL/L (ref 21–32)
CREAT SERPL-MCNC: 0.6 MG/DL (ref 0.6–1)
GLOBULIN SER CALC-MCNC: 2.3 G/DL (ref 2.3–3.5)
GLUCOSE SERPL-MCNC: 104 MG/DL (ref 65–100)
POTASSIUM SERPL-SCNC: 3.7 MMOL/L (ref 3.5–5.1)
PROT SERPL-MCNC: 6.8 G/DL (ref 6.3–8.2)
SODIUM SERPL-SCNC: 141 MMOL/L (ref 136–145)
TSH, 3RD GENERATION: 0.6 UIU/ML (ref 0.36–3.74)

## 2022-07-12 PROCEDURE — 99214 OFFICE O/P EST MOD 30 MIN: CPT | Performed by: FAMILY MEDICINE

## 2022-07-12 RX ORDER — SUCRALFATE 1 G/1
1 TABLET ORAL 4 TIMES DAILY
Qty: 120 TABLET | Refills: 5 | Status: SHIPPED | OUTPATIENT
Start: 2022-07-12

## 2022-07-12 RX ORDER — ESOMEPRAZOLE MAGNESIUM 40 MG/1
40 CAPSULE, DELAYED RELEASE ORAL 2 TIMES DAILY
Qty: 60 CAPSULE | Refills: 5 | Status: SHIPPED | OUTPATIENT
Start: 2022-07-12 | End: 2022-09-26 | Stop reason: SDUPTHER

## 2022-07-12 ASSESSMENT — ENCOUNTER SYMPTOMS
DIARRHEA: 0
ABDOMINAL PAIN: 1
NAUSEA: 1
ANAL BLEEDING: 0
BLOOD IN STOOL: 0
VOMITING: 0
ABDOMINAL DISTENTION: 1

## 2022-07-12 NOTE — PROGRESS NOTES
[facility]  _______________________________________  MD Judy Calvillo, DO Vikram Field, MD Robby Syed, [address]  Phone: [phone]  Fax: [phone]    Slava Salinas (:  1965) is a 59 y.o. female,Established patient, here for evaluation of the following chief complaint(s):  Abdominal Pain (x 1 week ), Bloated, and Heartburn         ASSESSMENT/PLAN:    1. Acute gastritis, presence of bleeding unspecified, unspecified gastritis type  This looks pretty serious. I gave her strict precautions of when to go to the ER including hematochezia, melena, coffe ground emesis. Checking Hgb and metabolic panel now and sending urgently to GI for FU (she will need an EGD and is due for repeat colo due to Fhx and poor prep). Double the PPI to BID, add back carafate.    - CBC with Auto Differential; Future  - Comprehensive Metabolic Panel; Future  - sucralfate (CARAFATE) 1 GM tablet; Take 1 tablet by mouth 4 times daily  Dispense: 120 tablet; Refill: 5  - esomeprazole (NEXIUM) 40 MG delayed release capsule; Take 1 capsule by mouth in the morning and at bedtime  Dispense: 60 capsule; Refill: 5  - [facility]    2. Polyp of colon, unspecified part of colon, unspecified type  As above. - [facility]    3. Acquired hypothyroidism  Will spot check her thyroid while she is here. Hyperthyroid levels would not be helpful in her current situation.   - TSH; Future    FU 4w      No follow-ups on file. Subjective   SUBJECTIVE/OBJECTIVE:    Pt with hx of complicated migraines, hypothyroid, RA, GERD is here with what seems to be acute gastritis:    Is on Nexium 40 at baseline. Has been dealing with regional pain syndrome since a bunionectomy in Dec 2021. Has been seeing PMR for this who had her on NSAIDs until she stopped them.  Has been dealing with bad LUQ pain now for a week in spite of taking the nexium and also carafate (which she needs a refill on ). Cannot tolerate tablet PPI. Lab Results   Component Value Date    HGB 13.5 11/17/2021     Lab Results   Component Value Date    TSH 1.160 03/31/2022     Wt Readings from Last 3 Encounters:   01/24/22 125 lb (56.7 kg)   12/14/21 125 lb (56.7 kg)   11/19/21 126 lb (57.2 kg)     HM:  She is due for repeat colo, poor prep on the last colo. Vitals:    07/12/22 0942   BP: 110/70       Review of Systems   Constitutional: Negative for chills and fever. Gastrointestinal: Positive for abdominal distention, abdominal pain and nausea. Negative for anal bleeding, blood in stool, diarrhea and vomiting. Objective   Physical Exam  Vitals and nursing note reviewed. Constitutional:       Appearance: Normal appearance. HENT:      Head: Normocephalic and atraumatic. Right Ear: External ear normal.      Left Ear: External ear normal.      Nose:      Comments: Deviated septum 2+ left     Mouth/Throat:      Mouth: Mucous membranes are moist.   Eyes:      General: No scleral icterus. Extraocular Movements: Extraocular movements intact. Pupils: Pupils are equal, round, and reactive to light. Cardiovascular:      Rate and Rhythm: Normal rate and regular rhythm. Pulses: Normal pulses. Heart sounds: No murmur heard. No friction rub. No gallop. Pulmonary:      Effort: Pulmonary effort is normal. No respiratory distress. Breath sounds: Normal breath sounds. No stridor. No wheezing. Abdominal:      General: Bowel sounds are normal. There is no distension. Tenderness: There is abdominal tenderness. There is guarding. There is no right CVA tenderness or left CVA tenderness. Comments: TTP in the LUQ   Musculoskeletal:         General: No swelling or tenderness. Normal range of motion. Cervical back: Normal range of motion. No rigidity. Right lower leg: No edema. Left lower leg: No edema. Skin:     General: Skin is warm and dry. Coloration: Skin is not pale. Neurological:      General: No focal deficit present. Mental Status: She is alert and oriented to person, place, and time. Mental status is at baseline. Cranial Nerves: No cranial nerve deficit. Deep Tendon Reflexes: Reflexes normal.   Psychiatric:         Mood and Affect: Mood normal.         Behavior: Behavior normal.         Thought Content: Thought content normal.         Judgment: Judgment normal.                  An electronic signature was used to authenticate this note.     --Janette Soliz MD

## 2022-07-13 LAB
BASOPHILS # BLD: 0 K/UL (ref 0–0.2)
BASOPHILS NFR BLD: 1 % (ref 0–2)
DIFFERENTIAL METHOD BLD: NORMAL
EOSINOPHIL # BLD: 0.1 K/UL (ref 0–0.8)
EOSINOPHIL NFR BLD: 2 % (ref 0.5–7.8)
ERYTHROCYTE [DISTWIDTH] IN BLOOD BY AUTOMATED COUNT: 13.3 % (ref 11.9–14.6)
HCT VFR BLD AUTO: 43.3 % (ref 35.8–46.3)
HGB BLD-MCNC: 13.7 G/DL (ref 11.7–15.4)
IMM GRANULOCYTES # BLD AUTO: 0 K/UL (ref 0–0.5)
IMM GRANULOCYTES NFR BLD AUTO: 0 % (ref 0–5)
LYMPHOCYTES # BLD: 1.9 K/UL (ref 0.5–4.6)
LYMPHOCYTES NFR BLD: 30 % (ref 13–44)
MCH RBC QN AUTO: 30.6 PG (ref 26.1–32.9)
MCHC RBC AUTO-ENTMCNC: 31.6 G/DL (ref 31.4–35)
MCV RBC AUTO: 96.9 FL (ref 79.6–97.8)
MONOCYTES # BLD: 0.5 K/UL (ref 0.1–1.3)
MONOCYTES NFR BLD: 8 % (ref 4–12)
NEUTS SEG # BLD: 3.6 K/UL (ref 1.7–8.2)
NEUTS SEG NFR BLD: 59 % (ref 43–78)
NRBC # BLD: 0 K/UL (ref 0–0.2)
PLATELET # BLD AUTO: 222 K/UL (ref 150–450)
PMV BLD AUTO: 11.6 FL (ref 9.4–12.3)
RBC # BLD AUTO: 4.47 M/UL (ref 4.05–5.2)
WBC # BLD AUTO: 6.1 K/UL (ref 4.3–11.1)

## 2022-07-15 DIAGNOSIS — E03.9 ACQUIRED HYPOTHYROIDISM: Primary | ICD-10-CM

## 2022-07-15 RX ORDER — LEVOTHYROXINE SODIUM 112 UG/1
112 TABLET ORAL
Qty: 90 TABLET | Refills: 1 | Status: SHIPPED | OUTPATIENT
Start: 2022-07-15 | End: 2022-09-13 | Stop reason: SDUPTHER

## 2022-08-09 ENCOUNTER — OFFICE VISIT (OUTPATIENT)
Dept: FAMILY MEDICINE CLINIC | Facility: CLINIC | Age: 57
End: 2022-08-09
Payer: COMMERCIAL

## 2022-08-09 VITALS — DIASTOLIC BLOOD PRESSURE: 70 MMHG | SYSTOLIC BLOOD PRESSURE: 102 MMHG | HEIGHT: 66 IN | BODY MASS INDEX: 20.18 KG/M2

## 2022-08-09 DIAGNOSIS — E03.9 ACQUIRED HYPOTHYROIDISM: ICD-10-CM

## 2022-08-09 DIAGNOSIS — K21.9 GASTROESOPHAGEAL REFLUX DISEASE WITHOUT ESOPHAGITIS: ICD-10-CM

## 2022-08-09 DIAGNOSIS — K21.9 GASTROESOPHAGEAL REFLUX DISEASE WITHOUT ESOPHAGITIS: Primary | ICD-10-CM

## 2022-08-09 DIAGNOSIS — F33.41 MDD (MAJOR DEPRESSIVE DISORDER), RECURRENT, IN PARTIAL REMISSION (HCC): ICD-10-CM

## 2022-08-09 LAB
BASOPHILS # BLD: 0 K/UL (ref 0–0.2)
BASOPHILS NFR BLD: 0 % (ref 0–2)
DIFFERENTIAL METHOD BLD: ABNORMAL
EOSINOPHIL # BLD: 0.1 K/UL (ref 0–0.8)
EOSINOPHIL NFR BLD: 2 % (ref 0.5–7.8)
ERYTHROCYTE [DISTWIDTH] IN BLOOD BY AUTOMATED COUNT: 13.2 % (ref 11.9–14.6)
HCT VFR BLD AUTO: 40.8 % (ref 35.8–46.3)
HGB BLD-MCNC: 12.9 G/DL (ref 11.7–15.4)
IMM GRANULOCYTES # BLD AUTO: 0 K/UL (ref 0–0.5)
IMM GRANULOCYTES NFR BLD AUTO: 0 % (ref 0–5)
LYMPHOCYTES # BLD: 1.6 K/UL (ref 0.5–4.6)
LYMPHOCYTES NFR BLD: 32 % (ref 13–44)
MCH RBC QN AUTO: 31.1 PG (ref 26.1–32.9)
MCHC RBC AUTO-ENTMCNC: 31.6 G/DL (ref 31.4–35)
MCV RBC AUTO: 98.3 FL (ref 79.6–97.8)
MONOCYTES # BLD: 0.5 K/UL (ref 0.1–1.3)
MONOCYTES NFR BLD: 11 % (ref 4–12)
NEUTS SEG # BLD: 2.9 K/UL (ref 1.7–8.2)
NEUTS SEG NFR BLD: 55 % (ref 43–78)
NRBC # BLD: 0 K/UL (ref 0–0.2)
PLATELET # BLD AUTO: 198 K/UL (ref 150–450)
PMV BLD AUTO: 11.5 FL (ref 9.4–12.3)
RBC # BLD AUTO: 4.15 M/UL (ref 4.05–5.2)
TSH, 3RD GENERATION: 1.18 UIU/ML (ref 0.36–3.74)
WBC # BLD AUTO: 5.2 K/UL (ref 4.3–11.1)

## 2022-08-09 PROCEDURE — 99214 OFFICE O/P EST MOD 30 MIN: CPT | Performed by: FAMILY MEDICINE

## 2022-08-09 ASSESSMENT — ENCOUNTER SYMPTOMS
CHEST TIGHTNESS: 0
BLOOD IN STOOL: 0
ABDOMINAL PAIN: 1
SHORTNESS OF BREATH: 0

## 2022-08-09 NOTE — PROGRESS NOTES
[facility]  _______________________________________  MD Ivon Dan DO Elvie Georgi, NP Maylon [place]    [address] [address] , [address] [place] [address] Avenue  Phone: [phone]  Fax: [phone]    Mary Kate Salinas (:  1965) is a 59 y.o. female,Established patient, here for evaluation of the following chief complaint(s):  GI Problem (Follow up )         ASSESSMENT/PLAN:    1. Gastroesophageal reflux disease without esophagitis  Improved but not fully controlled. Continue current plan and get that EGD to make sure she doesn't have an ulcer that won't heal. Trend Hgb today. - CBC with Auto Differential; Future    2. Acquired hypothyroidism  Stable, continue current regimen. Check levels. - TSH; Future    3. MDD (major depressive disorder), recurrent, in partial remission (HCC)  Stable, continue current regimen. She contracts for safety. FU 6m    Subjective   SUBJECTIVE/OBJECTIVE:  Was here a month ago with what seemed to be pretty severe gastritis. We got her on PRN carafate and high dose PPI nexium 40 BID. GI saw her in FU and left her on those meds while planning for EGD/[place]. The meds have improved her symptoms but not 100%, will proceed with EGD as planned. Coughing is a little less. She is able to eat and is not losing any more weight. Wt Readings from Last 3 Encounters:   22 125 lb (56.7 kg)   21 125 lb (56.7 kg)   21 126 lb (57.2 kg)     Lab Results   Component Value Date    WBC 6.1 2022    HGB 13.7 2022    HCT 43.3 2022    MCV 96.9 2022     2022     Lab Results   Component Value Date    TSH 1.160 2022     She is on levothyroxine 112 for hypothyroidism. MDD: Mood is stable on her Prozac 40. Review of Systems   Constitutional:  Negative for chills and fever. Respiratory:  Negative for chest tightness and shortness of breath.     Cardiovascular:  Negative for chest pain. Gastrointestinal:  Positive for abdominal pain. Negative for blood in stool. Genitourinary:  Negative for hematuria. Neurological:  Negative for syncope. Objective   Physical Exam  Vitals and nursing note reviewed. Constitutional:       Appearance: Normal appearance. HENT:      Head: Normocephalic and atraumatic. Right Ear: External ear normal.      Left Ear: External ear normal.      Mouth/Throat:      Mouth: Mucous membranes are moist.   Eyes:      General: No scleral icterus. Extraocular Movements: Extraocular movements intact. Pupils: Pupils are equal, round, and reactive to light. Cardiovascular:      Rate and Rhythm: Normal rate and regular rhythm. Pulses: Normal pulses. Heart sounds: No murmur heard. No friction rub. No gallop. Pulmonary:      Effort: Pulmonary effort is normal. No respiratory distress. Breath sounds: Normal breath sounds. No stridor. No wheezing. Abdominal:      General: Bowel sounds are normal. There is no distension. Tenderness: There is abdominal tenderness. There is no right CVA tenderness or left CVA tenderness. Comments: Mild TTP in the LUQ and midepigastrium, improved over previous   Musculoskeletal:         General: No swelling or tenderness. Cervical back: Normal range of motion. No rigidity. Right lower leg: No edema. Left lower leg: No edema. Comments: Has R arm in splint sling from recent elbow surgery. R ankle in walking boot. Skin:     General: Skin is warm and dry. Coloration: Skin is not pale. Neurological:      General: No focal deficit present. Mental Status: She is alert and oriented to person, place, and time. Mental status is at baseline. Cranial Nerves: No cranial nerve deficit. Deep Tendon Reflexes: Reflexes normal.   Psychiatric:         Mood and Affect: Mood normal.         Behavior: Behavior normal.         Thought Content:  Thought content normal.         Judgment: Judgment normal.                An electronic signature was used to authenticate this note.     --Jordan Doherty MD

## 2022-08-11 ENCOUNTER — TELEPHONE (OUTPATIENT)
Dept: ORTHOPEDIC SURGERY | Age: 57
End: 2022-08-11

## 2022-08-18 ENCOUNTER — TELEPHONE (OUTPATIENT)
Dept: ORTHOPEDIC SURGERY | Age: 57
End: 2022-08-18

## 2022-08-18 DIAGNOSIS — G43.011 MIGRAINE WITHOUT AURA, INTRACTABLE, WITH STATUS MIGRAINOSUS: ICD-10-CM

## 2022-08-18 RX ORDER — RIZATRIPTAN BENZOATE 10 MG/1
10 TABLET, ORALLY DISINTEGRATING ORAL
Qty: 12 TABLET | Refills: 5 | Status: SHIPPED | OUTPATIENT
Start: 2022-08-18 | End: 2022-08-18

## 2022-08-28 DIAGNOSIS — E78.5 HYPERLIPIDEMIA, UNSPECIFIED HYPERLIPIDEMIA TYPE: ICD-10-CM

## 2022-08-29 RX ORDER — ATORVASTATIN CALCIUM 10 MG/1
TABLET, FILM COATED ORAL
Qty: 90 TABLET | Refills: 1 | OUTPATIENT
Start: 2022-08-29

## 2022-09-06 ENCOUNTER — HOSPITAL ENCOUNTER (OUTPATIENT)
Dept: LAB | Age: 57
Discharge: HOME | End: 2022-09-09

## 2022-09-06 PROCEDURE — 88305 TISSUE EXAM BY PATHOLOGIST: CPT

## 2022-09-06 PROCEDURE — 88312 SPECIAL STAINS GROUP 1: CPT

## 2022-09-13 DIAGNOSIS — E03.9 ACQUIRED HYPOTHYROIDISM: ICD-10-CM

## 2022-09-13 RX ORDER — LEVOTHYROXINE SODIUM 112 UG/1
112 TABLET ORAL
Qty: 90 TABLET | Refills: 1 | Status: SHIPPED | OUTPATIENT
Start: 2022-09-13

## 2022-09-26 ENCOUNTER — PATIENT MESSAGE (OUTPATIENT)
Dept: FAMILY MEDICINE CLINIC | Facility: CLINIC | Age: 57
End: 2022-09-26

## 2022-09-26 DIAGNOSIS — K29.00 ACUTE GASTRITIS, PRESENCE OF BLEEDING UNSPECIFIED, UNSPECIFIED GASTRITIS TYPE: ICD-10-CM

## 2022-09-26 RX ORDER — ESOMEPRAZOLE MAGNESIUM 40 MG/1
40 CAPSULE, DELAYED RELEASE ORAL 2 TIMES DAILY
Qty: 180 CAPSULE | Refills: 1 | Status: SHIPPED | OUTPATIENT
Start: 2022-09-26

## 2022-09-26 NOTE — TELEPHONE ENCOUNTER
From: Mary Kate Mendoza Fig  To: Dr. Rawls Gavel: 9/26/2022 11:12 AM EDT  Subject: Refill    I need my nexium refilled and sent to [facility]. Fax is [phone]. Our you can call it in for 90 day supply to [phone]. Please let me know so I can make sure they will be filling. I am almost out. Thank you.

## 2022-10-05 DIAGNOSIS — F33.41 MDD (MAJOR DEPRESSIVE DISORDER), RECURRENT, IN PARTIAL REMISSION (HCC): ICD-10-CM

## 2022-10-06 RX ORDER — FLUOXETINE HYDROCHLORIDE 40 MG/1
CAPSULE ORAL
Qty: 90 CAPSULE | Refills: 1 | OUTPATIENT
Start: 2022-10-06

## 2022-10-27 ENCOUNTER — NURSE ONLY (OUTPATIENT)
Dept: FAMILY MEDICINE CLINIC | Facility: CLINIC | Age: 57
End: 2022-10-27
Payer: COMMERCIAL

## 2022-10-27 DIAGNOSIS — Z23 ENCOUNTER FOR IMMUNIZATION: Primary | ICD-10-CM

## 2022-10-27 PROCEDURE — 90471 IMMUNIZATION ADMIN: CPT | Performed by: FAMILY MEDICINE

## 2022-10-27 PROCEDURE — 90674 CCIIV4 VAC NO PRSV 0.5 ML IM: CPT | Performed by: FAMILY MEDICINE

## 2022-11-02 ENCOUNTER — OFFICE VISIT (OUTPATIENT)
Dept: OBGYN CLINIC | Age: 57
End: 2022-11-02
Payer: COMMERCIAL

## 2022-11-02 VITALS
HEIGHT: 66 IN | BODY MASS INDEX: 19.93 KG/M2 | WEIGHT: 124 LBS | DIASTOLIC BLOOD PRESSURE: 68 MMHG | SYSTOLIC BLOOD PRESSURE: 108 MMHG

## 2022-11-02 DIAGNOSIS — Z12.31 SCREENING MAMMOGRAM FOR BREAST CANCER: ICD-10-CM

## 2022-11-02 DIAGNOSIS — N94.19 DYSPAREUNIA DUE TO MEDICAL CONDITION IN FEMALE PATIENT: ICD-10-CM

## 2022-11-02 DIAGNOSIS — Z12.4 SCREENING FOR CERVICAL CANCER: ICD-10-CM

## 2022-11-02 DIAGNOSIS — Z01.419 WELL WOMAN EXAM WITH ROUTINE GYNECOLOGICAL EXAM: Primary | ICD-10-CM

## 2022-11-02 PROCEDURE — 99214 OFFICE O/P EST MOD 30 MIN: CPT | Performed by: OBSTETRICS & GYNECOLOGY

## 2022-11-02 PROCEDURE — 99396 PREV VISIT EST AGE 40-64: CPT | Performed by: OBSTETRICS & GYNECOLOGY

## 2022-11-02 RX ORDER — DOCUSATE SODIUM 100 MG/1
100 CAPSULE, LIQUID FILLED ORAL 2 TIMES DAILY
COMMUNITY

## 2022-11-02 RX ORDER — MULTIVIT WITH MINERALS/LUTEIN
500 TABLET ORAL DAILY
COMMUNITY

## 2022-11-02 RX ORDER — TRIAMCINOLONE ACETONIDE 55 UG/1
2 SPRAY, METERED NASAL DAILY
COMMUNITY
Start: 2022-06-27

## 2022-11-02 RX ORDER — BUTALBITAL, ACETAMINOPHEN AND CAFFEINE 50; 325; 40 MG/1; MG/1; MG/1
1 TABLET ORAL EVERY 4 HOURS PRN
COMMUNITY
Start: 2022-04-26

## 2022-11-02 RX ORDER — ATOGEPANT 60 MG/1
1 TABLET ORAL
COMMUNITY
Start: 2022-05-11

## 2022-11-02 RX ORDER — PREGABALIN 100 MG/1
CAPSULE ORAL 2 TIMES DAILY
COMMUNITY

## 2022-11-02 RX ORDER — METHOTREXATE 10 MG/.2ML
INJECTION, SOLUTION SUBCUTANEOUS WEEKLY
COMMUNITY

## 2022-11-02 RX ORDER — HYDROCODONE BITARTRATE AND ACETAMINOPHEN 7.5; 325 MG/1; MG/1
TABLET ORAL
COMMUNITY
Start: 2022-10-26

## 2022-11-02 NOTE — PROGRESS NOTES
Valery Bernal  is a 59 y.o. No obstetric history on file. who is here for an annual exam.      History  Past Medical History:   Diagnosis Date    Bilateral occipital neuralgia 10/16/2018    Cervical migraine syndrome 8/6/2018    Chronic inflammatory arthritis     hands, feet, ankles, knees    CRPS 1, lower extremity     Current smoker     Depression     Environmental and seasonal allergies     GERD (gastroesophageal reflux disease)     managed with medication-patient states she has to remain slightly inclined when she sleeps to prevent GERD. Heart murmur     history per patient-\"I was told years ago that I had a murmur. \" Per patient last echo over 10 years ago. No murmur ascultated at PAT appointment.  12/14/21 states no murmur heard at last PAT appointment and no murmur heard since    Hiatal hernia     High cholesterol 02/15/2020    History of anemia     Hgb 13.5 11/17/21    History of chicken pox     History of migraine headaches     PRN medication-followed by PCP     History of TMJ disorder     Hypercholesterolemia     managed with medication     Intractable migraine without aura and with status migrainosus 6/27/2017    Measles     Mumps     Neck pain 6/30/2020    Nicotine vapor product user     Occipital neuralgia of left side 10/16/2018    Palpitations     Thyroid disease     hypo-managed with medication     Trapezius muscle spasm 6/7/2019    Unilateral headache 3/27/2018    ON FILE  Past Surgical History:   Procedure Laterality Date    BREAST SURGERY      CHOLECYSTECTOMY      liver biopsy    COLONOSCOPY N/A 7/3/2020    COLONOSCOPY 20 performed by Sbaino Teixeira MD at [facility]    COLONOSCOPY  2016    COLONOSCOPY FLX DX W/COLLJ SPEC WHEN PFRMD  7/3/2020         GYN  1989 dysplasia    2 cone biopsy      GYN      endo ablation and essure tubal    HEENT  1990    TMJ surgery     IMPLANT BREAST SILICONE/EQ      LIPOMA RESECTION Right     right hip/side    ORTHOPEDIC SURGERY Right 08/03/2022    elbow ORTHOPEDIC SURGERY Right 12/2021    foot    STIMULATOR SURGERY Right     foot    PRESENT IN FILE  Current Outpatient Medications on File Prior to Visit   Medication Sig Dispense Refill    Atogepant (QULIPTA) 60 MG TABS Take 1 tablet by mouth      butalbital-acetaminophen-caffeine (FIORICET, ESGIC) -40 MG per tablet Take 1 tablet by mouth every 4 hours as needed      HYDROcodone-acetaminophen (NORCO) 7.5-325 MG per tablet Do not start before October 26, 2022. 1 p.o.bid prn severe pain      Lidocaine 1.8 % PTCH Apply topically 3 times daily      Methotrexate, PF, (RASUVO) 10 MG/0.2ML chemo injection pen Inject into the skin once a week      pregabalin (LYRICA) 100 MG capsule Take by mouth 2 times daily.       triamcinolone (NASACORT) 55 MCG/ACT nasal inhaler 2 sprays by Nasal route daily      Ascorbic Acid (VITAMIN C) 250 MG tablet Take 500 mg by mouth daily      docusate sodium (COLACE) 100 MG capsule Take 100 mg by mouth 2 times daily      esomeprazole (NEXIUM) 40 MG delayed release capsule Take 1 capsule by mouth in the morning and at bedtime 180 capsule 1    levothyroxine (SYNTHROID) 112 MCG tablet Take 1 tablet by mouth every morning (before breakfast) 90 tablet 1    sucralfate (CARAFATE) 1 GM tablet Take 1 tablet by mouth 4 times daily 120 tablet 5    FLUoxetine (PROZAC) 40 MG capsule Take 1 capsule by mouth daily 90 capsule 1    ergocalciferol (ERGOCALCIFEROL) 1.25 MG (73146 UT) capsule Take 1 capsule by mouth every 7 days 12 capsule 1    atorvastatin (LIPITOR) 10 MG tablet Take 1 tablet by mouth daily TAKE 1 TABLET BY MOUTH EVERY DAY AT NIGHT 90 tablet 1    tiZANidine (ZANAFLEX) 4 MG tablet Take 1 tablet by mouth every evening 90 tablet 1    ondansetron (ZOFRAN) 8 MG tablet Take 1 tablet by mouth every 8 hours as needed for Nausea 30 tablet 1    diclofenac sodium (VOLTAREN) 1 % GEL Apply topically 4 times daily      folic acid (FOLVITE) 1 MG tablet Take by mouth daily      hydroxychloroquine (PLAQUENIL) 200 MG tablet Take 200 mg by mouth daily      hydrOXYzine (ATARAX) 25 MG tablet Take by mouth as needed      Hyoscyamine Sulfate SL 0.125 MG SUBL Place 0.125 mg under the tongue every 4 hours as needed      loratadine (CLARITIN) 10 MG tablet Take 10 mg by mouth daily      montelukast (SINGULAIR) 10 MG tablet Take 10 mg by mouth      rizatriptan (MAXALT-MLT) 10 MG disintegrating tablet Take 1 tablet by mouth once as needed for Migraine PLEASE SEE ATTACHED FOR DETAILED DIRECTIONS 12 tablet 5     No current facility-administered medications on file prior to visit. SEE UPDATED LIST  Allergies   Allergen Reactions    Covid-19 (Mrna) Vaccine Anaphylaxis    Fire Ant Anaphylaxis    Uloric [Febuxostat] Anaphylaxis    Wasp Venom Protein Anaphylaxis    Cetirizine Other (See Comments)     Stomach cramps vaginal bleeding    Echinacea Swelling     Throat swelling     Fluconazole Other (See Comments)    Levofloxacin Other (See Comments)     Drops her blood pressure    Penicillins Other (See Comments)    Sulfa Antibiotics Other (See Comments)    Nickel Rash   SEE LIST  Social History     Tobacco Use    Smoking status: Former     Packs/day: 0.50     Types: Cigarettes    Smokeless tobacco: Former    Tobacco comments:     Quit smokin21 states has quit vaping   Substance Use Topics    Alcohol use: No      Family History   Problem Relation Age of Onset    Hypertension Mother     Breast Cancer Neg Hx     Ovarian Cancer Neg Hx     Hypertension Maternal Grandmother     Colon Cancer Neg Hx      OBGYN History:             Physical Exam  Blood pressure 108/68, height 5' 6\" (1.676 m), weight 124 lb (56.2 kg). Body mass index is 20.01 kg/m². Lab Results   Component Value Date/Time    HGB 12.9 2022 12:29 PM      @[facility]([id number])@  No results found for: [place], HCGQR, THCGA1    HEENT unremarkable. Sclera non-icteric. Neck is supple without thyromegaly or nodes.  Chest clear to auscultation. Heart regular rate and rhythm with no murmur, Breast exam reveals no masses or nipple discharge. No axillary notes are palpable. Abdomen is benign. BUS is normal.  Cervix IF  present. Pap smeaR performed. PRN  Bimanual exam reveals no masses. Assessment  59 y.o. No obstetric history on file. for annual exam.  Encounter Diagnoses   Name Primary? Screening mammogram for breast cancer     Screening for cervical cancer     Well woman exam with routine gynecological exam Yes       Plan  Orders Placed This Encounter   Procedures    CHE DIGITAL SCREEN W OR WO CAD BILATERAL     Standing Status:   Future     Standing Expiration Date:   1/2/2024    PAP LB, Reflex HPV ASCUS     Standing Status:   Future     Standing Expiration Date:   11/2/2023     Order Specific Question:   Pap Source? (Required)     Answer:   cervical     Order Specific Question:   Pap Source? (Required)     Answer:   endocervical     Order Specific Question:   Pap collection method? (Required     Answer:   brush     Order Specific Question:   Pap collection method? (Required     Answer:   spatula     Order Specific Question:   Menstrual Status ? Answer:   Postmenopausal [2]     Order Specific Question:   Previous Treatment?           (Required)     Answer:   CONIZA   No pmb  no cdass  dyspareunia col 2027 revaree/estrace vag labs other pain man RA   \"DEXA ordered\",AT AGE 61          \"Screening olonoscopy ordered\",IF >44YO  DUE         \"Recommend Calcium/MVI\",IF >35YRS  \"Recommend Gardisil immunization\"IF AGE 9-45     }CONTRACEPTION DISCUSSED      STD CHECK OFFERED IF <30YO  ,MAMMOGRAM SCHEDULED IF >41YO          MOOD DISCUSSED             NOT SUICIDAL  HEALTH MEASURE DISCUSSED INCLUDING TEETH/EYE [employer]  APPTS                    DIET/EXERCISE /SLEEP    DISCUSSED                SMOKING DISCUSSED PRN      BLADDER CONTROL DISCUSSED    The patient is here for  problems including dyspareunia HISTORY:      No LMP recorded. Patient is postmenopausal.SEE BELOW      Current Outpatient Medications on File Prior to Visit   Medication Sig Dispense Refill    Atogepant (QULIPTA) 60 MG TABS Take 1 tablet by mouth      butalbital-acetaminophen-caffeine (FIORICET, ESGIC) -40 MG per tablet Take 1 tablet by mouth every 4 hours as needed      HYDROcodone-acetaminophen (NORCO) 7.5-325 MG per tablet Do not start before October 26, 2022. 1 p.o.bid prn severe pain      Lidocaine 1.8 % PTCH Apply topically 3 times daily      Methotrexate, PF, (RASUVO) 10 MG/0.2ML chemo injection pen Inject into the skin once a week      pregabalin (LYRICA) 100 MG capsule Take by mouth 2 times daily.       triamcinolone (NASACORT) 55 MCG/ACT nasal inhaler 2 sprays by Nasal route daily      Ascorbic Acid (VITAMIN C) 250 MG tablet Take 500 mg by mouth daily      docusate sodium (COLACE) 100 MG capsule Take 100 mg by mouth 2 times daily      esomeprazole (NEXIUM) 40 MG delayed release capsule Take 1 capsule by mouth in the morning and at bedtime 180 capsule 1    levothyroxine (SYNTHROID) 112 MCG tablet Take 1 tablet by mouth every morning (before breakfast) 90 tablet 1    sucralfate (CARAFATE) 1 GM tablet Take 1 tablet by mouth 4 times daily 120 tablet 5    FLUoxetine (PROZAC) 40 MG capsule Take 1 capsule by mouth daily 90 capsule 1    ergocalciferol (ERGOCALCIFEROL) 1.25 MG (51211 UT) capsule Take 1 capsule by mouth every 7 days 12 capsule 1    atorvastatin (LIPITOR) 10 MG tablet Take 1 tablet by mouth daily TAKE 1 TABLET BY MOUTH EVERY DAY AT NIGHT 90 tablet 1    tiZANidine (ZANAFLEX) 4 MG tablet Take 1 tablet by mouth every evening 90 tablet 1    ondansetron (ZOFRAN) 8 MG tablet Take 1 tablet by mouth every 8 hours as needed for Nausea 30 tablet 1    diclofenac sodium (VOLTAREN) 1 % GEL Apply topically 4 times daily      folic acid (FOLVITE) 1 MG tablet Take by mouth daily      hydroxychloroquine (PLAQUENIL) 200 MG tablet Take 200 mg by mouth daily      hydrOXYzine (ATARAX) 25 MG tablet Take by mouth as needed      Hyoscyamine Sulfate SL 0.125 MG SUBL Place 0.125 mg under the tongue every 4 hours as needed      loratadine (CLARITIN) 10 MG tablet Take 10 mg by mouth daily      montelukast (SINGULAIR) 10 MG tablet Take 10 mg by mouth      rizatriptan (MAXALT-MLT) 10 MG disintegrating tablet Take 1 tablet by mouth once as needed for Migraine PLEASE SEE ATTACHED FOR DETAILED DIRECTIONS 12 tablet 5     No current facility-administered medications on file prior to visit. SEE LIST    ROS:      PHYSICAL EXAM:  Blood pressure 108/68, height 5' 6\" (1.676 m), weight 124 lb (56.2 kg). The patient appears well, alert, oriented x 3, in no distress. Lungs are clear. Heart RRR, no murmurs. Abdomen soft without tenderness, guarding, mass or organomegaly. Pelvic: bg     ASSESSMENT:DIAGNOSIS DISCUSSED INCLUDING DIFFERENTIAL dyspareunia    PLAN:    Orders Placed This Encounter   Procedures    CHE DIGITAL SCREEN W OR WO CAD BILATERAL     Standing Status:   Future     Standing Expiration Date:   1/2/2024    PAP LB, Reflex HPV ASCUS     Standing Status:   Future     Standing Expiration Date:   11/2/2023     Order Specific Question:   Pap Source? (Required)     Answer:   cervical     Order Specific Question:   Pap Source? (Required)     Answer:   endocervical     Order Specific Question:   Pap collection method? (Required     Answer:   brush     Order Specific Question:   Pap collection method? (Required     Answer:   spatula     Order Specific Question:   Menstrual Status ? Answer:   Postmenopausal [2]     Order Specific Question:   Previous Treatment?           (Required)     Answer:   Anna Flores     Complex high risk decision making many questions answered history reviewed precharting done required 30 minute of time discussing a vaiety of problems  goals are set  follow up planned

## 2022-11-07 LAB
CYTOLOGIST CVX/VAG CYTO: NORMAL
CYTOLOGY CVX/VAG DOC THIN PREP: NORMAL
HPV REFLEX: NORMAL
Lab: NORMAL
PATH REPORT.FINAL DX SPEC: NORMAL
STAT OF ADQ CVX/VAG CYTO-IMP: NORMAL

## 2022-11-18 DIAGNOSIS — J30.9 ALLERGIC RHINITIS, UNSPECIFIED: ICD-10-CM

## 2022-11-21 ENCOUNTER — TELEPHONE (OUTPATIENT)
Dept: OBGYN CLINIC | Age: 57
End: 2022-11-21

## 2022-11-21 RX ORDER — MONTELUKAST SODIUM 10 MG/1
TABLET ORAL
Qty: 90 TABLET | Refills: 1 | Status: SHIPPED | OUTPATIENT
Start: 2022-11-21

## 2022-11-21 NOTE — TELEPHONE ENCOUNTER
Patient states Estrace Vaginal cream wasn't sent to pharmacy. She uses [facility] on [address] [facility].

## 2022-11-22 RX ORDER — ESTRADIOL 0.1 MG/G
0.4 CREAM VAGINAL DAILY
Qty: 1 EACH | Refills: 3 | Status: SHIPPED | OUTPATIENT
Start: 2022-11-22

## 2022-12-07 ENCOUNTER — TELEPHONE (OUTPATIENT)
Dept: ORTHOPEDIC SURGERY | Age: 57
End: 2022-12-07

## 2022-12-13 ENCOUNTER — TELEMEDICINE (OUTPATIENT)
Dept: FAMILY MEDICINE CLINIC | Facility: CLINIC | Age: 57
End: 2022-12-13
Payer: COMMERCIAL

## 2022-12-13 DIAGNOSIS — R41.3 MEMORY PROBLEM: Primary | ICD-10-CM

## 2022-12-13 DIAGNOSIS — E55.9 VITAMIN D DEFICIENCY, UNSPECIFIED: ICD-10-CM

## 2022-12-13 DIAGNOSIS — E78.5 HYPERLIPIDEMIA, UNSPECIFIED HYPERLIPIDEMIA TYPE: ICD-10-CM

## 2022-12-13 DIAGNOSIS — E03.9 ACQUIRED HYPOTHYROIDISM: ICD-10-CM

## 2022-12-13 DIAGNOSIS — K21.9 GASTROESOPHAGEAL REFLUX DISEASE WITHOUT ESOPHAGITIS: ICD-10-CM

## 2022-12-13 PROCEDURE — 99214 OFFICE O/P EST MOD 30 MIN: CPT | Performed by: FAMILY MEDICINE

## 2022-12-13 ASSESSMENT — ENCOUNTER SYMPTOMS
BLOOD IN STOOL: 0
SHORTNESS OF BREATH: 0
ABDOMINAL PAIN: 0
CHEST TIGHTNESS: 0

## 2022-12-13 NOTE — PROGRESS NOTES
Moo Salinas (:  1965) is a Established patient, here for evaluation of the following:    Chief Complaint   Patient presents with    Other     Having a lot of confusion and memory loss getting worse in the last few weeks          Assessment & Plan   Below is the assessment and plan developed based on review of pertinent history, physical exam, labs, studies, and medications. 1. Memory problem  Ddx includes medication side effect, metabolic issue, or brain issue (structural vs. Depression). Check MRI, check labs. Hold atorvastatin which has been implicated in memory issues. Will kaylee this down. - TSH; Future  - Comprehensive Metabolic Panel; Future  - CBC with Auto Differential; Future  - MRI BRAIN WO CONTRAST; Future    2. Acquired hypothyroidism  Check levels as above. - TSH; Future    3. Vitamin D deficiency, unspecified  Check levels. - Vitamin D 25 Hydroxy; Future    4. Hyperlipidemia, unspecified hyperlipidemia type  Stop the lipitor until further notice. If nothing else turns up and holding lipitor fixes her memory issues, switch her to generic crestor.  - Comprehensive Metabolic Panel; Future    5. Gastroesophageal reflux disease without esophagitis  Stable, continue current regimen. Check  Hgb.   - CBC with Auto Differential; Future    FU TBD pending results    Subjective     Pt with  HLD, hypothyroid, low D, depression, migraines c/o memory loss:    States she will wake up from a nap confused as to her surroundings and when/where she is. Could not recall her own phone number last week. Forgot her 's birthday. This has all gotten bas within the last couple weeks. Weight is stable. HLD: Has been on lipitor 10 for a while now. Lab Results   Component Value Date    [facility] 84 2022     Has been dealing with chronic arm problems through ortho for a while and has been on pain meds.  Taking Norco 1-2 times a day along with Lyrica, has been on this combo for about 10 months. Lab Results   Component Value Date    TSH 1.160 03/31/2022    [id number] 1.180 08/09/2022     Thyroid was OK 4m ago. Depression is about where it usually is, maybe a little worse than previous. Review of Systems   Constitutional:  Positive for fatigue. Negative for chills, fever and unexpected weight change. Respiratory:  Negative for chest tightness and shortness of breath. Cardiovascular:  Negative for chest pain. Gastrointestinal:  Negative for abdominal pain and blood in stool. Genitourinary:  Positive for genital sores. Negative for hematuria. Musculoskeletal:  Positive for arthralgias. Neurological:  Negative for tremors, syncope, speech difficulty and weakness. Psychiatric/Behavioral:  Positive for confusion and dysphoric mood. Negative for agitation, behavioral problems, decreased concentration, sleep disturbance and suicidal ideas.          Objective   Patient-Reported Vitals  No data recorded     Physical Exam  [INSTRUCTIONS:  \"[x]\" Indicates a positive item  \"[]\" Indicates a negative item  -- DELETE ALL ITEMS NOT EXAMINED]    Constitutional: [x] Appears well-developed and well-nourished [x] No apparent distress      [] Abnormal -     Mental status: [x] Alert and awake  [x] Oriented to person/place/time [x] Able to follow commands    [] Abnormal -     Eyes:   EOM    [x]  Normal    [] Abnormal -   Sclera  [x]  Normal    [] Abnormal -          Discharge [x]  None visible   [] Abnormal -     HENT: [x] Normocephalic, atraumatic  [] Abnormal -   [x] Mouth/Throat: Mucous membranes are moist    External Ears [x] Normal  [] Abnormal -    Neck: [x] No visualized mass [] Abnormal -     Pulmonary/Chest: [x] Respiratory effort normal   [x] No visualized signs of difficulty breathing or respiratory distress        [] Abnormal -      Musculoskeletal:   [x] Normal gait with no signs of ataxia         [x] Normal range of motion of neck        [] Abnormal -     Neurological:        [x] No Facial Asymmetry (Cranial nerve 7 motor function) (limited exam due to video visit)          [x] No gaze palsy        [] Abnormal -          Skin:        [x] No significant exanthematous lesions or discoloration noted on facial skin         [] Abnormal -            Psychiatric:       [x] Normal Affect [] Abnormal -        [x] No Hallucinations    Other pertinent observable physical exam findings:-             Cherry Salinas, was evaluated through a synchronous (real-time) audio-video encounter. The patient (or guardian if applicable) is aware that this is a billable service, which includes applicable co-pays. This Virtual Visit was conducted with patient's (and/or legal guardian's) consent. The visit was conducted pursuant to the emergency declaration under the [address] waiver authority and the [employer] General Act. Patient identification was verified, and a caregiver was present when appropriate. The patient was located at Home: [address] [address]. Provider was located at [facility] [address] [place] [address].         --Ashley Lira MD

## 2022-12-16 DIAGNOSIS — E78.5 HYPERLIPIDEMIA, UNSPECIFIED HYPERLIPIDEMIA TYPE: ICD-10-CM

## 2022-12-16 DIAGNOSIS — R41.3 MEMORY PROBLEM: ICD-10-CM

## 2022-12-16 DIAGNOSIS — K21.9 GASTROESOPHAGEAL REFLUX DISEASE WITHOUT ESOPHAGITIS: ICD-10-CM

## 2022-12-16 DIAGNOSIS — E03.9 ACQUIRED HYPOTHYROIDISM: ICD-10-CM

## 2022-12-16 DIAGNOSIS — E55.9 VITAMIN D DEFICIENCY, UNSPECIFIED: ICD-10-CM

## 2022-12-16 LAB
BASOPHILS # BLD: 0 K/UL (ref 0–0.2)
BASOPHILS NFR BLD: 0 % (ref 0–2)
DIFFERENTIAL METHOD BLD: NORMAL
EOSINOPHIL # BLD: 0.1 K/UL (ref 0–0.8)
EOSINOPHIL NFR BLD: 2 % (ref 0.5–7.8)
ERYTHROCYTE [DISTWIDTH] IN BLOOD BY AUTOMATED COUNT: 12.6 % (ref 11.9–14.6)
HCT VFR BLD AUTO: 38.9 % (ref 35.8–46.3)
HGB BLD-MCNC: 12.5 G/DL (ref 11.7–15.4)
IMM GRANULOCYTES # BLD AUTO: 0 K/UL (ref 0–0.5)
IMM GRANULOCYTES NFR BLD AUTO: 0 % (ref 0–5)
LYMPHOCYTES # BLD: 1.4 K/UL (ref 0.5–4.6)
LYMPHOCYTES NFR BLD: 27 % (ref 13–44)
MCH RBC QN AUTO: 30.3 PG (ref 26.1–32.9)
MCHC RBC AUTO-ENTMCNC: 32.1 G/DL (ref 31.4–35)
MCV RBC AUTO: 94.2 FL (ref 82–102)
MONOCYTES # BLD: 0.6 K/UL (ref 0.1–1.3)
MONOCYTES NFR BLD: 11 % (ref 4–12)
NEUTS SEG # BLD: 3 K/UL (ref 1.7–8.2)
NEUTS SEG NFR BLD: 60 % (ref 43–78)
NRBC # BLD: 0 K/UL (ref 0–0.2)
PLATELET # BLD AUTO: 207 K/UL (ref 150–450)
PMV BLD AUTO: 11.6 FL (ref 9.4–12.3)
RBC # BLD AUTO: 4.13 M/UL (ref 4.05–5.2)
WBC # BLD AUTO: 5 K/UL (ref 4.3–11.1)

## 2022-12-17 LAB
25(OH)D3 SERPL-MCNC: 116.2 NG/ML (ref 30–100)
ALBUMIN SERPL-MCNC: 4 G/DL (ref 3.5–5)
ALBUMIN/GLOB SERPL: 1.5 (ref 0.4–1.6)
ALP SERPL-CCNC: 84 U/L (ref 50–136)
ALT SERPL-CCNC: 22 U/L (ref 12–65)
ANION GAP SERPL CALC-SCNC: 9 MMOL/L (ref 2–11)
AST SERPL-CCNC: 11 U/L (ref 15–37)
BILIRUB SERPL-MCNC: 0.4 MG/DL (ref 0.2–1.1)
BUN SERPL-MCNC: 8 MG/DL (ref 6–23)
CALCIUM SERPL-MCNC: 9.4 MG/DL (ref 8.3–10.4)
CHLORIDE SERPL-SCNC: 109 MMOL/L (ref 101–110)
CO2 SERPL-SCNC: 26 MMOL/L (ref 21–32)
CREAT SERPL-MCNC: 0.6 MG/DL (ref 0.6–1)
GLOBULIN SER CALC-MCNC: 2.7 G/DL (ref 2.8–4.5)
GLUCOSE SERPL-MCNC: 94 MG/DL (ref 65–100)
POTASSIUM SERPL-SCNC: 4.1 MMOL/L (ref 3.5–5.1)
PROT SERPL-MCNC: 6.7 G/DL (ref 6.3–8.2)
SODIUM SERPL-SCNC: 144 MMOL/L (ref 133–143)
TSH, 3RD GENERATION: 0.49 UIU/ML (ref 0.36–3.74)

## 2022-12-19 ENCOUNTER — PATIENT MESSAGE (OUTPATIENT)
Dept: FAMILY MEDICINE CLINIC | Facility: CLINIC | Age: 57
End: 2022-12-19

## 2022-12-19 DIAGNOSIS — E03.9 ACQUIRED HYPOTHYROIDISM: ICD-10-CM

## 2022-12-19 DIAGNOSIS — M17.11 OSTEOARTHRITIS OF RIGHT KNEE, UNSPECIFIED OSTEOARTHRITIS TYPE: Primary | ICD-10-CM

## 2022-12-19 DIAGNOSIS — J30.9 ALLERGIC RHINITIS, UNSPECIFIED: ICD-10-CM

## 2022-12-20 DIAGNOSIS — J30.9 ALLERGIC RHINITIS, UNSPECIFIED: ICD-10-CM

## 2022-12-20 RX ORDER — MONTELUKAST SODIUM 10 MG/1
TABLET ORAL
Qty: 30 TABLET | Refills: 0 | Status: SHIPPED | OUTPATIENT
Start: 2022-12-20

## 2022-12-20 RX ORDER — LEVOTHYROXINE SODIUM 112 UG/1
112 TABLET ORAL
Qty: 90 TABLET | Refills: 1 | Status: SHIPPED | OUTPATIENT
Start: 2022-12-20

## 2022-12-20 RX ORDER — MONTELUKAST SODIUM 10 MG/1
TABLET ORAL
Qty: 90 TABLET | Refills: 1 | Status: SHIPPED | OUTPATIENT
Start: 2022-12-20

## 2022-12-20 NOTE — TELEPHONE ENCOUNTER
From: Gibran Brooks Fig  To: Dr. Claire Congregation: 12/19/2022 2:49 PM EST  Subject: Lyric Moore, I had an e-visit with Dr. Jeannette Bowers last week and forgot to request refills. I need my montelukast filled for a 30 day supply at [facility] on [address] Replaced by [facility] [address] and a90 day supply sent to [facility]. Also need refills for levothyroxine abs diclophenac cream [facility]. Thank you. I'm not sure about the atorvastatin. He may be changing that medication.     Torie Bautista Fig

## 2022-12-28 ENCOUNTER — OFFICE VISIT (OUTPATIENT)
Dept: ORTHOPEDIC SURGERY | Age: 57
End: 2022-12-28
Payer: COMMERCIAL

## 2022-12-28 DIAGNOSIS — M20.11 HALLUX VALGUS OF RIGHT FOOT: ICD-10-CM

## 2022-12-28 DIAGNOSIS — M79.671 RIGHT FOOT PAIN: Primary | ICD-10-CM

## 2022-12-28 PROCEDURE — 99213 OFFICE O/P EST LOW 20 MIN: CPT | Performed by: ORTHOPAEDIC SURGERY

## 2022-12-28 NOTE — PROGRESS NOTES
Name: Tim Roger  YOB: 1965  Gender: female  MRN: [id number]    Summary:     Right leg reflex sympathetic dystrophy status post bunionectomy     CC: Foot Pain (Right foot will xray today)       HPI: Tim Roger is a 59 y.o. female who presents with Foot Pain (Right foot will xray today)  . Patient returns back to the office today for follow-up of her right bunionectomy done about a year and a half ago. She is now has a spinal cord stimulator in place and she continues under the care of pain management which is somewhat improved RSD. Continues to have pain and swelling in the extremity makes shoewear and hypersensitivity difficult as well as standing on her foot. History was obtained by Patient     ROS/Meds/PSH/PMH/FH/SH: I personally reviewed the patients standard intake form. Below are the pertinents    Tobacco:  reports that she has quit smoking. Her smoking use included cigarettes. She smoked an average of .5 packs per day. She has quit using smokeless tobacco.  Diabetes: None      Physical Examination:    Exam of the right lower extremity shows expected swelling. Incisions are well-healed. She does not continue to have some hyper or hypersensitivity in the foot globally. The alignment of the toe is satisfactory. Imaging:   I independently interpreted XR taken today  Right foot XR: AP, Lateral, Oblique views     ICD-10-CM    1.  Right foot pain  M79.671 XR FOOT RIGHT (MIN 3 VIEWS)      2. Hallux valgus of right foot  M20.11          Report: AP, lateral, oblique x-ray of the foot demonstrates no hardware failure    Impression: No hardware failure   Iona Whitfield III, MD           Assessment:   Right persistent reflex sympathetic dystrophy status post bunionectomy    Treatment Plan:   3 This is stable chronic illness/condition  Treatment at this time: Physical Therapy  Studies ordered: NO XR needed @ Next Visit    Weight-bearing status: WBAT        Return to work/work restrictions: She remains out of work as she is unable to wear shoe or stand on her foot for more than about 5 minutes without significant pain. She will return in 6 months for reassessment of this. He continues under the care of pain management.   No medications given

## 2022-12-30 ENCOUNTER — PATIENT MESSAGE (OUTPATIENT)
Dept: FAMILY MEDICINE CLINIC | Facility: CLINIC | Age: 57
End: 2022-12-30

## 2022-12-30 NOTE — TELEPHONE ENCOUNTER
From: Paramjit Tran Fig  To: Dr. Blanco Amin: 12/30/2022 11:35 AM EST  Subject: Help please    Dr. Deniz Quintero I have suffered with migraines the past seven days. Please call me in some fioricet. The maxalt is not enough. I can't take more than two maxalt a day. Taking both together helps more. I am also going to the walk in clinic for a toradol shot today. Please call if you have any questions. Stephanie Kelsey Fig    Thank you so much.

## 2023-01-04 RX ORDER — UBROGEPANT 100 MG/1
100 TABLET ORAL DAILY PRN
Qty: 16 TABLET | Refills: 5 | Status: SHIPPED | OUTPATIENT
Start: 2023-01-04

## 2023-02-08 ENCOUNTER — TELEPHONE (OUTPATIENT)
Dept: ORTHOPEDIC SURGERY | Age: 58
End: 2023-02-08

## 2023-02-08 NOTE — TELEPHONE ENCOUNTER
Per Latoya agrawal/CARL she completed [facility] form and faxed it back in 2-2-23.

## 2023-02-21 ENCOUNTER — OFFICE VISIT (OUTPATIENT)
Dept: FAMILY MEDICINE CLINIC | Facility: CLINIC | Age: 58
End: 2023-02-21
Payer: COMMERCIAL

## 2023-02-21 VITALS
WEIGHT: 125 LBS | SYSTOLIC BLOOD PRESSURE: 112 MMHG | BODY MASS INDEX: 20.09 KG/M2 | HEIGHT: 66 IN | DIASTOLIC BLOOD PRESSURE: 78 MMHG

## 2023-02-21 DIAGNOSIS — E78.5 HYPERLIPIDEMIA, UNSPECIFIED HYPERLIPIDEMIA TYPE: ICD-10-CM

## 2023-02-21 DIAGNOSIS — E03.9 ACQUIRED HYPOTHYROIDISM: Primary | ICD-10-CM

## 2023-02-21 DIAGNOSIS — F33.41 MDD (MAJOR DEPRESSIVE DISORDER), RECURRENT, IN PARTIAL REMISSION (HCC): ICD-10-CM

## 2023-02-21 DIAGNOSIS — K29.00 ACUTE GASTRITIS, PRESENCE OF BLEEDING UNSPECIFIED, UNSPECIFIED GASTRITIS TYPE: ICD-10-CM

## 2023-02-21 DIAGNOSIS — E55.9 AVITAMINOSIS D: ICD-10-CM

## 2023-02-21 DIAGNOSIS — J30.9 ALLERGIC RHINITIS, UNSPECIFIED SEASONALITY, UNSPECIFIED TRIGGER: ICD-10-CM

## 2023-02-21 DIAGNOSIS — E03.9 ACQUIRED HYPOTHYROIDISM: ICD-10-CM

## 2023-02-21 PROCEDURE — 99214 OFFICE O/P EST MOD 30 MIN: CPT | Performed by: FAMILY MEDICINE

## 2023-02-21 RX ORDER — ESOMEPRAZOLE MAGNESIUM 40 MG/1
40 CAPSULE, DELAYED RELEASE ORAL 2 TIMES DAILY
Qty: 180 CAPSULE | Refills: 1 | Status: SHIPPED | OUTPATIENT
Start: 2023-02-21

## 2023-02-21 RX ORDER — ERGOCALCIFEROL 1.25 MG/1
50000 CAPSULE ORAL
Qty: 12 CAPSULE | Refills: 1 | Status: CANCELLED | OUTPATIENT
Start: 2023-02-21

## 2023-02-21 RX ORDER — FLUOXETINE HYDROCHLORIDE 40 MG/1
40 CAPSULE ORAL DAILY
Qty: 90 CAPSULE | Refills: 1 | Status: SHIPPED | OUTPATIENT
Start: 2023-02-21

## 2023-02-21 ASSESSMENT — ENCOUNTER SYMPTOMS
ABDOMINAL PAIN: 1
BLOOD IN STOOL: 0
SHORTNESS OF BREATH: 0
CHEST TIGHTNESS: 0

## 2023-02-21 NOTE — PROGRESS NOTES
[facility]  _______________________________________  MD Angela Corbin, DO Lupita Lager, NP Imogene [place]cer, [place]    [address] , [address] [place] [address]  Phone: [phone]  Fax: [phone]    Tate Salinas (:  1965) is a 59 y.o. female,Established patient, here for evaluation of the following chief complaint(s):  No chief complaint on file. ASSESSMENT/PLAN:    1. Allergic rhinitis, unspecified  Stable, continue current regimen. 2. Acquired hypothyroidism  Stable, continue current regimen. Check levels. - TSH; Future    3. MDD (major depressive disorder), recurrent, in partial remission (HCC)  Stable, continue current regimen. She contracts for safety.   - FLUoxetine (PROZAC) 40 MG capsule; Take 1 capsule by mouth daily  Dispense: 90 capsule; Refill: 1    4. Acute gastritis, presence of bleeding unspecified, unspecified gastritis type  Stable, continue current regimen. - esomeprazole (NEXIUM) 40 MG delayed release capsule; Take 1 capsule by mouth in the morning and at bedtime  Dispense: 180 capsule; Refill: 1    5. Avitaminosis D  Will hold off on refills until I see if her levels have come down significantly. - Vitamin D 25 Hydroxy; Future  - Comprehensive Metabolic Panel; Future    6. Hyperlipidemia, unspecified hyperlipidemia type  Had rare cognitive side effect specific to lipitor. But quit smoking years ago, her risk is now low, will spot check her numbers today. - Lipid Panel; Future      FU 6m    Subjective   SUBJECTIVE/OBJECTIVE:       Pt with  HLD, hypothyroid, low D, depression, migraines c/o memory loss:     Previously: States she will wake up from a nap confused as to her surroundings and when/where she is. Could not recall her own phone number last week. Forgot her 's birthday. This has all gotten bas within the last couple weeks. Weight is stable. We ordered an MRI, she never had it done. Still on norco/flexeril/lyrica for ongoing ortho issues. Has been dealing with chronic arm problems through ortho for a while and has been on pain meds. Taking Norco 1-2 times a day along with Lyrica, has been on this combo for about 10 months. HLD: Stopped taking lipitor 10. The 10-year ASCVD risk score (Kwadwo BUCKNER, et al., 2019) is: 2.3%    Values used to calculate the score:      Age: 59 years      Sex: Female      Is Non- : No      Diabetic: No      Tobacco smoker: No      Systolic Blood Pressure: 535 mmHg      Is BP treated: No      HDL Cholesterol: 47 mg/dL      Total Cholesterol: 153 mg/dL       Lab Results   Component Value Date    TSH 1.160 03/31/2022    [id number] 0.495 12/16/2022     She is on levothyroxine 112. Levels nominal 2m ago. Wt Readings from Last 3 Encounters:   02/21/23 125 lb (56.7 kg)   11/02/22 124 lb (56.2 kg)   03/31/22 124 lb (56.2 kg)     Lab Results   Component Value Date    WBC 5.0 12/16/2022    HGB 12.5 12/16/2022    HCT 38.9 12/16/2022    MCV 94.2 12/16/2022     12/16/2022     Lab Results   Component Value Date    TSH 1.160 03/31/2022     She is on levothyroxine 112 for hypothyroidism. MDD: Mood is stable on her Prozac 40. Asks or refill on her allergy meds, singulair and flonase. GERD: Nexium 40. Symptoms controlled. Low D: Asks for refill on high dose weekly D. Lab Results   Component Value Date    VITD25 116.2 (H) 12/16/2022         Review of Systems   Constitutional:  Negative for chills and fever. Respiratory:  Negative for chest tightness and shortness of breath. Cardiovascular:  Negative for chest pain. Gastrointestinal:  Positive for abdominal pain. Negative for blood in stool. Genitourinary:  Negative for hematuria. Neurological:  Negative for syncope. Objective   Physical Exam  Vitals and nursing note reviewed. Constitutional:       Appearance: Normal appearance.    HENT:      Head: Normocephalic and atraumatic. Right Ear: External ear normal.      Left Ear: External ear normal.      Mouth/Throat:      Mouth: Mucous membranes are moist.   Eyes:      General: No scleral icterus. Extraocular Movements: Extraocular movements intact. Pupils: Pupils are equal, round, and reactive to light. Cardiovascular:      Rate and Rhythm: Normal rate and regular rhythm. Pulses: Normal pulses. Heart sounds: No murmur heard. No friction rub. No gallop. Pulmonary:      Effort: Pulmonary effort is normal. No respiratory distress. Breath sounds: Normal breath sounds. No stridor. No wheezing. Abdominal:      General: Bowel sounds are normal. There is no distension. Tenderness: There is no abdominal tenderness. There is no right CVA tenderness or left CVA tenderness. Musculoskeletal:         General: No swelling or tenderness. Cervical back: Normal range of motion. No rigidity. Right lower leg: No edema. Left lower leg: No edema. Comments: R ankle in walking boot. Skin:     General: Skin is warm and dry. Coloration: Skin is not pale. Neurological:      General: No focal deficit present. Mental Status: She is alert and oriented to person, place, and time. Mental status is at baseline. Cranial Nerves: No cranial nerve deficit. Deep Tendon Reflexes: Reflexes normal.   Psychiatric:         Mood and Affect: Mood normal.         Behavior: Behavior normal.         Thought Content: Thought content normal.         Judgment: Judgment normal.                An electronic signature was used to authenticate this note.     --Tracy Osorio MD

## 2023-02-22 LAB
25(OH)D3 SERPL-MCNC: 58.5 NG/ML (ref 30–100)
ALBUMIN SERPL-MCNC: 3.9 G/DL (ref 3.2–4.6)
ALBUMIN/GLOB SERPL: 1.4 (ref 0.4–1.6)
ALP SERPL-CCNC: 79 U/L (ref 50–136)
ALT SERPL-CCNC: 29 U/L (ref 12–65)
ANION GAP SERPL CALC-SCNC: 4 MMOL/L (ref 2–11)
AST SERPL-CCNC: 23 U/L (ref 15–37)
BILIRUB SERPL-MCNC: 0.6 MG/DL (ref 0.2–1.1)
BUN SERPL-MCNC: 5 MG/DL (ref 8–23)
CALCIUM SERPL-MCNC: 9.3 MG/DL (ref 8.3–10.4)
CHLORIDE SERPL-SCNC: 109 MMOL/L (ref 101–110)
CHOLEST SERPL-MCNC: 239 MG/DL
CO2 SERPL-SCNC: 28 MMOL/L (ref 21–32)
CREAT SERPL-MCNC: 0.7 MG/DL (ref 0.6–1)
GLOBULIN SER CALC-MCNC: 2.7 G/DL (ref 2.8–4.5)
GLUCOSE SERPL-MCNC: 201 MG/DL (ref 65–100)
HDLC SERPL-MCNC: 52 MG/DL (ref 40–60)
HDLC SERPL: 4.6
LDLC SERPL CALC-MCNC: 160 MG/DL
POTASSIUM SERPL-SCNC: 3.3 MMOL/L (ref 3.5–5.1)
PROT SERPL-MCNC: 6.6 G/DL (ref 6.3–8.2)
SODIUM SERPL-SCNC: 141 MMOL/L (ref 133–143)
TRIGL SERPL-MCNC: 135 MG/DL (ref 35–150)
TSH, 3RD GENERATION: 0.28 UIU/ML (ref 0.36–3.74)
VLDLC SERPL CALC-MCNC: 27 MG/DL (ref 6–23)

## 2023-02-22 RX ORDER — ERGOCALCIFEROL 1.25 MG/1
50000 CAPSULE ORAL
Qty: 12 CAPSULE | Refills: 1 | Status: SHIPPED | OUTPATIENT
Start: 2023-02-22

## 2023-02-22 RX ORDER — LEVOTHYROXINE SODIUM 0.1 MG/1
100 TABLET ORAL
Qty: 90 TABLET | Refills: 1 | Status: SHIPPED | OUTPATIENT
Start: 2023-02-22

## 2023-02-23 DIAGNOSIS — R73.9 HYPERGLYCEMIA: Primary | ICD-10-CM

## 2023-02-27 ENCOUNTER — NURSE ONLY (OUTPATIENT)
Dept: FAMILY MEDICINE CLINIC | Facility: CLINIC | Age: 58
End: 2023-02-27

## 2023-02-27 DIAGNOSIS — R73.9 HYPERGLYCEMIA: ICD-10-CM

## 2023-02-27 LAB
ANION GAP SERPL CALC-SCNC: 2 MMOL/L (ref 2–11)
BUN SERPL-MCNC: 9 MG/DL (ref 8–23)
CALCIUM SERPL-MCNC: 9.7 MG/DL (ref 8.3–10.4)
CHLORIDE SERPL-SCNC: 109 MMOL/L (ref 101–110)
CO2 SERPL-SCNC: 28 MMOL/L (ref 21–32)
CREAT SERPL-MCNC: 0.6 MG/DL (ref 0.6–1)
EST. AVERAGE GLUCOSE BLD GHB EST-MCNC: 105 MG/DL
GLUCOSE SERPL-MCNC: 92 MG/DL (ref 65–100)
HBA1C MFR BLD: 5.3 % (ref 4.8–5.6)
POTASSIUM SERPL-SCNC: 3.6 MMOL/L (ref 3.5–5.1)
SODIUM SERPL-SCNC: 139 MMOL/L (ref 133–143)

## 2023-02-28 ENCOUNTER — TELEPHONE (OUTPATIENT)
Dept: ORTHOPEDIC SURGERY | Age: 58
End: 2023-02-28

## 2023-03-01 ENCOUNTER — OFFICE VISIT (OUTPATIENT)
Dept: OBGYN CLINIC | Age: 58
End: 2023-03-01

## 2023-03-01 VITALS — SYSTOLIC BLOOD PRESSURE: 110 MMHG | WEIGHT: 125 LBS | DIASTOLIC BLOOD PRESSURE: 60 MMHG | BODY MASS INDEX: 20.18 KG/M2

## 2023-03-01 DIAGNOSIS — N95.2 VAGINAL ATROPHY: Primary | ICD-10-CM

## 2023-03-26 DIAGNOSIS — K29.00 ACUTE GASTRITIS, PRESENCE OF BLEEDING UNSPECIFIED, UNSPECIFIED GASTRITIS TYPE: ICD-10-CM

## 2023-03-27 RX ORDER — ESOMEPRAZOLE MAGNESIUM 40 MG/1
CAPSULE, DELAYED RELEASE ORAL
Qty: 180 CAPSULE | Refills: 1 | OUTPATIENT
Start: 2023-03-27

## 2023-03-27 RX ORDER — TIZANIDINE 4 MG/1
TABLET ORAL
Qty: 90 TABLET | Refills: 2 | OUTPATIENT
Start: 2023-03-27

## 2023-03-29 ENCOUNTER — TELEPHONE (OUTPATIENT)
Dept: ORTHOPEDIC SURGERY | Age: 58
End: 2023-03-29

## 2023-03-29 NOTE — TELEPHONE ENCOUNTER
Received 208 pages from [facility].  Sent notes to central scanning and put notes in Dr Chasity Cheng MA box at  3/29/23,MB

## 2023-04-05 ENCOUNTER — OFFICE VISIT (OUTPATIENT)
Dept: ORTHOPEDIC SURGERY | Age: 58
End: 2023-04-05

## 2023-04-05 DIAGNOSIS — G90.50 RSD (REFLEX SYMPATHETIC DYSTROPHY): ICD-10-CM

## 2023-04-05 DIAGNOSIS — M20.21 HALLUX RIGIDUS OF RIGHT FOOT: Primary | ICD-10-CM

## 2023-04-05 NOTE — PROGRESS NOTES
The patient was prescribed a walker boot for the patient's right foot. The patient wears a size 8.5 shoe and I fitted them with a M size boot. The patient was fitted and instructed on the use of prescribed walker boot. I explained how to fit themselves and that the plastic flexible piece should always be on the front of the boot and secured by the Velcro straps on top. The air bladder in the boot was adjusted according to proper fit and comfort. The patient walked a short distance and acknowledged satisfaction with current fit. I also explained that they need a heel lift or a higher heeled shoe for the uninvolved LE to help normalize gait and avoid excessive low back stress/strain due to leg length inequality created from walker boot. Patient read and signed documenting they understand and agree to [facility]'s current DME return policy.
and may be all to help with that. She will continue with a walker boot and hopefully can wean out of this at some point as it does bother her arthritic knee on the right. She is certainly in no position to return to work at this point and she continues to have persistent pain and requires use of a walker boot constantly can only be on her foot for about 3 hours. She will return in 6 months.

## 2023-04-20 ENCOUNTER — CLINICAL DOCUMENTATION (OUTPATIENT)
Dept: ORTHOPEDIC SURGERY | Age: 58
End: 2023-04-20

## 2023-04-28 ENCOUNTER — TELEPHONE (OUTPATIENT)
Dept: ORTHOPEDIC SURGERY | Age: 58
End: 2023-04-28

## 2023-05-04 ENCOUNTER — TELEPHONE (OUTPATIENT)
Dept: ORTHOPEDIC SURGERY | Age: 58
End: 2023-05-04

## 2023-05-21 DIAGNOSIS — G43.011 MIGRAINE WITHOUT AURA, INTRACTABLE, WITH STATUS MIGRAINOSUS: ICD-10-CM

## 2023-05-22 ENCOUNTER — TELEPHONE (OUTPATIENT)
Dept: NEUROLOGY | Age: 58
End: 2023-05-22

## 2023-05-22 RX ORDER — ATOGEPANT 60 MG/1
TABLET ORAL
Qty: 30 TABLET | Refills: 11 | OUTPATIENT
Start: 2023-05-22

## 2023-05-22 NOTE — TELEPHONE ENCOUNTER
Pt schedule VV on 8/15/2023 due to patient not seeing or having any appointments in a year and she needs refills.  Pt is requesting refills on all her medicine and she needs a call back [phone]

## 2023-05-24 ENCOUNTER — HOSPITAL ENCOUNTER (OUTPATIENT)
Dept: PHYSICAL THERAPY | Age: 58
Setting detail: RECURRING SERIES
Discharge: HOME | End: 2023-05-27
Payer: COMMERCIAL

## 2023-05-24 PROCEDURE — 97110 THERAPEUTIC EXERCISES: CPT

## 2023-05-24 PROCEDURE — 97162 PT EVAL MOD COMPLEX 30 MIN: CPT

## 2023-05-24 PROCEDURE — 97161 PT EVAL LOW COMPLEX 20 MIN: CPT

## 2023-05-24 NOTE — PROGRESS NOTES
Ronal Skiff Fig  : 1965  Primary: Bobby Sampson (Commercial)  Secondary:  [facility]  [address] [place] Vega [address] [place] [address]  Phone: [phone]  Fax: [phone]    No data recorded    >PT Visit Info:       Visit Count:  Visit count could not be calculated. Make sure you are using a visit which is associated with an episode.     OUTPATIENT PHYSICAL THERAPY:OP NOTE TYPE: Treatment Note 2023       Episode  }Appt Desk             Treatment Diagnosis:  {Rehab Dx Codes:75617}  Medical/Referring Diagnosis:  Hallux rigidus, right foot [M20.21]  Complex regional pain syndrome I, unspecified [G90.50]  Referring Physician:  Odessa Duenas MD MD Orders:  PT Eval and Treat ***  Date of Onset:  No data recorded   Allergies:   Covid-19 (mrna) vaccine, Fire ant, Uloric [febuxostat], Wasp venom protein, Cetirizine, Echinacea, Fluconazole, Levofloxacin, Lipitor [atorvastatin], Penicillins, Sulfa antibiotics, and Nickel  Restrictions/Precautions:  No data recorded  No data recorded   Interventions Planned (Treatment may consist of any combination of the following):    No data recorded     >Subjective Comments:     >Initial:      /10>Post Session:        /10  Medications Last Reviewed:  2023  Updated Objective Findings:  {New Findings:32066}  Treatment   {TREATMENTSOPPT:76134}  {Grids/Tables:28509}    Treatment/Session Summary:    >Treatment Assessment:     Communication/Consultation:  {Communication:88951}  Equipment provided today:  {None/Wildcard:56928}  Recommendations/Intent for next treatment session: Next visit will focus on ***.    >Total Treatment Billable Duration:  *** minutes       Pleasant , PT       Charge Capture  }Post Session Pain  PT Visit Info  Workube Portal  MD Guidelines  Scanned Media  Benefits  MyChart    Future Appointments   Date Time Provider Delta Osei   2023  9:00 AM Miguel Dunham III, MD [facility] AMB   8/15/2023  3:00 PM Jerry Lacey MD [facility]

## 2023-05-24 NOTE — THERAPY EVALUATION
Disha David Fig  : 1965  Primary: Dalton Gotti (Commercial)  Secondary:  [facility]  [address]  [facility] [address]  Phone: [phone]  Fax: [phone]         PT Visit Info:       Visit Count:  Visit count could not be calculated. Make sure you are using a visit which is associated with an episode. OUTPATIENT PHYSICAL THERAPY:             OP NOTE TYPE: Initial Assessment 2023               Episode (No data found) Appt Desk         Treatment Diagnosis:  {Rehab [facility] Dx:50939}  Medical/Referring Diagnosis:  Hallux rigidus of right foot  RSD (reflex sympathetic dystrophy)Hallux rigidus, right foot [M20.21]  Complex regional pain syndrome I, unspecified [G90.50]  Referring Physician:  Oswaldo Neumann MD MD Orders:  PT Eval and Treat ***  Return MD Appt:  ***  Date of Onset:       Allergies:  Covid-19 (mrna) vaccine, Fire ant, Uloric [febuxostat], Wasp venom protein, Cetirizine, Echinacea, Fluconazole, Levofloxacin, Lipitor [atorvastatin], Penicillins, Sulfa antibiotics, and Nickel  Restrictions/Precautions:           Medications Last Reviewed:  2023     SUBJECTIVE   History of Injury/Illness (Reason for Referral):  Reports that she was diagnosed w/ CRPS, she was sent to the pain management clinic. She had 2 nerve blocks in July. She had a permanent spine stimulator put in October,  lyrica 100mg in morning and 200 mg at night, norco 5 mg 2x/day as needed and lidocaine patches as needed. She uses a boot for anything over 15-20 steps. She feels like the left ankle also has CRPS. She spends a lot of time in bed because of the pain. She does drive but does not like it and has a lot of pain. Prolonged sitting or walking is also very painful. She still has swelling and change of color.    Patient Stated Goal(s):  \"***\"  Initial:      /10 Post Session:      /10  Past Medical History/Comorbidities:   Ms. Florencio Dominguez  has a past medical history of Bilateral occipital

## 2023-05-25 DIAGNOSIS — G43.011 MIGRAINE WITHOUT AURA, INTRACTABLE, WITH STATUS MIGRAINOSUS: ICD-10-CM

## 2023-05-25 RX ORDER — RIZATRIPTAN BENZOATE 10 MG/1
10 TABLET, ORALLY DISINTEGRATING ORAL
Qty: 12 TABLET | Refills: 5 | Status: SHIPPED | OUTPATIENT
Start: 2023-05-25 | End: 2023-05-25

## 2023-05-25 RX ORDER — ONDANSETRON HYDROCHLORIDE 8 MG/1
8 TABLET, FILM COATED ORAL EVERY 8 HOURS PRN
Qty: 30 TABLET | Refills: 1 | Status: SHIPPED | OUTPATIENT
Start: 2023-05-25

## 2023-05-25 RX ORDER — TIZANIDINE 4 MG/1
4 TABLET ORAL EVERY EVENING
Qty: 90 TABLET | Refills: 3 | Status: SHIPPED | OUTPATIENT
Start: 2023-05-25

## 2023-05-26 RX ORDER — ATOGEPANT 60 MG/1
1 TABLET ORAL DAILY
Qty: 90 TABLET | Refills: 3 | Status: SHIPPED | OUTPATIENT
Start: 2023-05-26

## 2023-05-30 ENCOUNTER — PATIENT MESSAGE (OUTPATIENT)
Dept: FAMILY MEDICINE CLINIC | Facility: CLINIC | Age: 58
End: 2023-05-30

## 2023-05-30 DIAGNOSIS — G90.59 COMPLEX REGIONAL PAIN SYNDROME TYPE 1 AFFECTING OTHER SITE: Primary | ICD-10-CM

## 2023-05-31 NOTE — TELEPHONE ENCOUNTER
From: Latoya Garcia Fig  To: Dr. Estrella Lady: 5/30/2023 6:14 PM EDT  Subject: Referral    Dr. Rod Gibson, is there another Doctor or pain clinic you can refer me to for my CRPS. Unfortunately I have been denied in part due to the lack of response from the pain management doctor. I am beyond frustrated and really need your help.   Thank you  Jenifer Harmon Fig

## 2023-06-07 ASSESSMENT — PAIN SCALES - GENERAL: PAINLEVEL_OUTOF10: 6

## 2023-06-08 ENCOUNTER — TELEPHONE (OUTPATIENT)
Dept: FAMILY MEDICINE CLINIC | Facility: CLINIC | Age: 58
End: 2023-06-08

## 2023-06-08 NOTE — TELEPHONE ENCOUNTER
Medication  diclofenac sodium (VOLTAREN) 1 % GEL  CaseId:[id number];Status:Approved; Review Type:Prior Auth; Coverage Start Date:05/09/2023; Coverage End Date:06/07/2024;

## 2023-06-10 DIAGNOSIS — J30.9 ALLERGIC RHINITIS, UNSPECIFIED: ICD-10-CM

## 2023-06-12 RX ORDER — MONTELUKAST SODIUM 10 MG/1
TABLET ORAL
Qty: 90 TABLET | Refills: 1 | OUTPATIENT
Start: 2023-06-12

## 2023-06-28 ENCOUNTER — OFFICE VISIT (OUTPATIENT)
Dept: ORTHOPEDIC SURGERY | Age: 58
End: 2023-06-28

## 2023-06-28 DIAGNOSIS — G90.50 RSD (REFLEX SYMPATHETIC DYSTROPHY): ICD-10-CM

## 2023-06-28 RX ORDER — HYDROCODONE BITARTRATE AND ACETAMINOPHEN 5; 325 MG/1; MG/1
1 TABLET ORAL EVERY 6 HOURS PRN
COMMUNITY

## 2023-06-28 RX ORDER — EPINEPHRINE 0.3 MG/.3ML
INJECTION SUBCUTANEOUS
COMMUNITY
Start: 2023-06-15

## 2023-06-28 RX ORDER — BECLOMETHASONE DIPROPIONATE 80 UG/1
2 AEROSOL, METERED NASAL DAILY
COMMUNITY
Start: 2023-06-15

## 2023-07-31 DIAGNOSIS — E03.9 ACQUIRED HYPOTHYROIDISM: ICD-10-CM

## 2023-07-31 RX ORDER — LEVOTHYROXINE SODIUM 0.1 MG/1
TABLET ORAL
Qty: 90 TABLET | Refills: 3 | OUTPATIENT
Start: 2023-07-31

## 2023-08-14 NOTE — PROGRESS NOTES
encounter. The patient (or guardian if applicable) is aware that this is a billable service, which includes applicable co-pays. This Virtual Visit was conducted with patient's (and/or legal guardian's) consent. Patient identification was verified, and a caregiver was present when appropriate.    The patient was located at Home: [address]  Provider was located at Home ([facility]): Jayce Ingram MD

## 2023-08-15 ENCOUNTER — TELEMEDICINE (OUTPATIENT)
Dept: NEUROLOGY | Age: 58
End: 2023-08-15
Payer: COMMERCIAL

## 2023-08-15 DIAGNOSIS — G43.011 MIGRAINE WITHOUT AURA, INTRACTABLE, WITH STATUS MIGRAINOSUS: Primary | ICD-10-CM

## 2023-08-15 PROCEDURE — 99213 OFFICE O/P EST LOW 20 MIN: CPT | Performed by: PSYCHIATRY & NEUROLOGY

## 2023-08-15 RX ORDER — ONDANSETRON 8 MG/1
8 TABLET, ORALLY DISINTEGRATING ORAL EVERY 8 HOURS PRN
Qty: 30 TABLET | Refills: 3 | Status: SHIPPED | OUTPATIENT
Start: 2023-08-15

## 2023-08-18 SDOH — ECONOMIC STABILITY: FOOD INSECURITY: WITHIN THE PAST 12 MONTHS, THE FOOD YOU BOUGHT JUST DIDN'T LAST AND YOU DIDN'T HAVE MONEY TO GET MORE.: PATIENT DECLINED

## 2023-08-18 SDOH — ECONOMIC STABILITY: INCOME INSECURITY: HOW HARD IS IT FOR YOU TO PAY FOR THE VERY BASICS LIKE FOOD, HOUSING, MEDICAL CARE, AND HEATING?: PATIENT DECLINED

## 2023-08-18 SDOH — ECONOMIC STABILITY: HOUSING INSECURITY
IN THE LAST 12 MONTHS, WAS THERE A TIME WHEN YOU DID NOT HAVE A STEADY PLACE TO SLEEP OR SLEPT IN A SHELTER (INCLUDING NOW)?: NO

## 2023-08-18 SDOH — ECONOMIC STABILITY: FOOD INSECURITY: WITHIN THE PAST 12 MONTHS, YOU WORRIED THAT YOUR FOOD WOULD RUN OUT BEFORE YOU GOT MONEY TO BUY MORE.: PATIENT DECLINED

## 2023-08-18 SDOH — ECONOMIC STABILITY: TRANSPORTATION INSECURITY
IN THE PAST 12 MONTHS, HAS LACK OF TRANSPORTATION KEPT YOU FROM MEETINGS, WORK, OR FROM GETTING THINGS NEEDED FOR DAILY LIVING?: NO

## 2023-08-18 ASSESSMENT — PATIENT HEALTH QUESTIONNAIRE - PHQ9
6. FEELING BAD ABOUT YOURSELF - OR THAT YOU ARE A FAILURE OR HAVE LET YOURSELF OR YOUR FAMILY DOWN: 1
2. FEELING DOWN, DEPRESSED OR HOPELESS: 1
2. FEELING DOWN, DEPRESSED OR HOPELESS: SEVERAL DAYS
1. LITTLE INTEREST OR PLEASURE IN DOING THINGS: SEVERAL DAYS
3. TROUBLE FALLING OR STAYING ASLEEP: 1
5. POOR APPETITE OR OVEREATING: SEVERAL DAYS
SUM OF ALL RESPONSES TO PHQ QUESTIONS 1-9: 7
SUM OF ALL RESPONSES TO PHQ9 QUESTIONS 1 & 2: 2
1. LITTLE INTEREST OR PLEASURE IN DOING THINGS: 1
SUM OF ALL RESPONSES TO PHQ QUESTIONS 1-9: 7
5. POOR APPETITE OR OVEREATING: 1
10. IF YOU CHECKED OFF ANY PROBLEMS, HOW DIFFICULT HAVE THESE PROBLEMS MADE IT FOR YOU TO DO YOUR WORK, TAKE CARE OF THINGS AT HOME, OR GET ALONG WITH OTHER PEOPLE: SOMEWHAT DIFFICULT
10. IF YOU CHECKED OFF ANY PROBLEMS, HOW DIFFICULT HAVE THESE PROBLEMS MADE IT FOR YOU TO DO YOUR WORK, TAKE CARE OF THINGS AT HOME, OR GET ALONG WITH OTHER PEOPLE: 1
6. FEELING BAD ABOUT YOURSELF - OR THAT YOU ARE A FAILURE OR HAVE LET YOURSELF OR YOUR FAMILY DOWN: SEVERAL DAYS
7. TROUBLE CONCENTRATING ON THINGS, SUCH AS READING THE NEWSPAPER OR WATCHING TELEVISION: SEVERAL DAYS
9. THOUGHTS THAT YOU WOULD BE BETTER OFF DEAD, OR OF HURTING YOURSELF: NOT AT ALL
SUM OF ALL RESPONSES TO PHQ QUESTIONS 1-9: 7
3. TROUBLE FALLING OR STAYING ASLEEP: SEVERAL DAYS
4. FEELING TIRED OR HAVING LITTLE ENERGY: SEVERAL DAYS
9. THOUGHTS THAT YOU WOULD BE BETTER OFF DEAD, OR OF HURTING YOURSELF: 0
4. FEELING TIRED OR HAVING LITTLE ENERGY: 1
SUM OF ALL RESPONSES TO PHQ QUESTIONS 1-9: 7
SUM OF ALL RESPONSES TO PHQ QUESTIONS 1-9: 7
8. MOVING OR SPEAKING SO SLOWLY THAT OTHER PEOPLE COULD HAVE NOTICED. OR THE OPPOSITE, BEING SO FIGETY OR RESTLESS THAT YOU HAVE BEEN MOVING AROUND A LOT MORE THAN USUAL: 0
7. TROUBLE CONCENTRATING ON THINGS, SUCH AS READING THE NEWSPAPER OR WATCHING TELEVISION: 1
8. MOVING OR SPEAKING SO SLOWLY THAT OTHER PEOPLE COULD HAVE NOTICED. OR THE OPPOSITE - BEING SO FIDGETY OR RESTLESS THAT YOU HAVE BEEN MOVING AROUND A LOT MORE THAN USUAL: NOT AT ALL

## 2023-08-21 ENCOUNTER — OFFICE VISIT (OUTPATIENT)
Dept: FAMILY MEDICINE CLINIC | Facility: CLINIC | Age: 58
End: 2023-08-21
Payer: COMMERCIAL

## 2023-08-21 VITALS
SYSTOLIC BLOOD PRESSURE: 119 MMHG | BODY MASS INDEX: 19.61 KG/M2 | HEIGHT: 66 IN | DIASTOLIC BLOOD PRESSURE: 80 MMHG | WEIGHT: 122 LBS

## 2023-08-21 DIAGNOSIS — E03.9 ACQUIRED HYPOTHYROIDISM: ICD-10-CM

## 2023-08-21 DIAGNOSIS — K21.9 GASTROESOPHAGEAL REFLUX DISEASE WITHOUT ESOPHAGITIS: ICD-10-CM

## 2023-08-21 DIAGNOSIS — M17.11 OSTEOARTHRITIS OF RIGHT KNEE, UNSPECIFIED OSTEOARTHRITIS TYPE: ICD-10-CM

## 2023-08-21 DIAGNOSIS — E78.5 HYPERLIPIDEMIA, UNSPECIFIED HYPERLIPIDEMIA TYPE: ICD-10-CM

## 2023-08-21 DIAGNOSIS — E55.9 AVITAMINOSIS D: ICD-10-CM

## 2023-08-21 DIAGNOSIS — J30.9 ALLERGIC RHINITIS, UNSPECIFIED SEASONALITY, UNSPECIFIED TRIGGER: ICD-10-CM

## 2023-08-21 DIAGNOSIS — F33.41 MDD (MAJOR DEPRESSIVE DISORDER), RECURRENT, IN PARTIAL REMISSION (HCC): ICD-10-CM

## 2023-08-21 DIAGNOSIS — E78.5 HYPERLIPIDEMIA, UNSPECIFIED HYPERLIPIDEMIA TYPE: Primary | ICD-10-CM

## 2023-08-21 LAB
25(OH)D3 SERPL-MCNC: 67.1 NG/ML (ref 30–100)
ALBUMIN SERPL-MCNC: 4.2 G/DL (ref 3.2–4.6)
ALBUMIN/GLOB SERPL: 1.6 (ref 0.4–1.6)
ALP SERPL-CCNC: 66 U/L (ref 50–136)
ALT SERPL-CCNC: 24 U/L (ref 12–65)
ANION GAP SERPL CALC-SCNC: 8 MMOL/L (ref 2–11)
AST SERPL-CCNC: 20 U/L (ref 15–37)
BILIRUB SERPL-MCNC: 0.5 MG/DL (ref 0.2–1.1)
BUN SERPL-MCNC: 8 MG/DL (ref 8–23)
CALCIUM SERPL-MCNC: 9.6 MG/DL (ref 8.3–10.4)
CHLORIDE SERPL-SCNC: 109 MMOL/L (ref 101–110)
CHOLEST SERPL-MCNC: 241 MG/DL
CO2 SERPL-SCNC: 27 MMOL/L (ref 21–32)
CREAT SERPL-MCNC: 0.6 MG/DL (ref 0.6–1)
GLOBULIN SER CALC-MCNC: 2.6 G/DL (ref 2.8–4.5)
GLUCOSE SERPL-MCNC: 85 MG/DL (ref 65–100)
HDLC SERPL-MCNC: 54 MG/DL (ref 40–60)
HDLC SERPL: 4.5
LDLC SERPL CALC-MCNC: 157.2 MG/DL
POTASSIUM SERPL-SCNC: 3.9 MMOL/L (ref 3.5–5.1)
PROT SERPL-MCNC: 6.8 G/DL (ref 6.3–8.2)
SODIUM SERPL-SCNC: 144 MMOL/L (ref 133–143)
TRIGL SERPL-MCNC: 149 MG/DL (ref 35–150)
TSH, 3RD GENERATION: 0.96 UIU/ML (ref 0.36–3.74)
VLDLC SERPL CALC-MCNC: 29.8 MG/DL (ref 6–23)

## 2023-08-21 PROCEDURE — 99214 OFFICE O/P EST MOD 30 MIN: CPT | Performed by: FAMILY MEDICINE

## 2023-08-21 RX ORDER — FLUOXETINE HYDROCHLORIDE 40 MG/1
40 CAPSULE ORAL DAILY
Qty: 90 CAPSULE | Refills: 1 | Status: SHIPPED | OUTPATIENT
Start: 2023-08-21

## 2023-08-21 RX ORDER — MONTELUKAST SODIUM 10 MG/1
TABLET ORAL
Qty: 90 TABLET | Refills: 1 | Status: SHIPPED | OUTPATIENT
Start: 2023-08-21

## 2023-08-21 RX ORDER — ESOMEPRAZOLE MAGNESIUM 40 MG/1
40 CAPSULE, DELAYED RELEASE ORAL 2 TIMES DAILY
Qty: 180 CAPSULE | Refills: 1 | Status: SHIPPED | OUTPATIENT
Start: 2023-08-21

## 2023-08-21 RX ORDER — ERGOCALCIFEROL 1.25 MG/1
50000 CAPSULE ORAL
Qty: 12 CAPSULE | Refills: 1 | Status: SHIPPED | OUTPATIENT
Start: 2023-08-21

## 2023-08-21 ASSESSMENT — ENCOUNTER SYMPTOMS
ABDOMINAL PAIN: 1
BLOOD IN STOOL: 0
SHORTNESS OF BREATH: 0
CHEST TIGHTNESS: 0

## 2023-08-21 NOTE — PROGRESS NOTES
[facility]  _______________________________________  MD Chely Henry, TYREL Ny MD Dalton Hussar, [address] [place] [address], [address]  Phone: [phone]  Fax: [phone]    Alka Salinas (:  1965) is a 59 y.o. female,Established patient, here for evaluation of the following chief complaint(s):  Cholesterol Problem (Has question about medication ), Thyroid Problem, and Depression         ASSESSMENT/PLAN:    1. Osteoarthritis of right knee, unspecified osteoarthritis type  Stable, continue current regimen.     - diclofenac sodium (VOLTAREN) 1 % GEL; Apply topically 4 times daily  Dispense: 50 g; Refill: 1  - Comprehensive Metabolic Panel; Future    2. Gastroesophageal reflux disease without esophagitis  Stable, continue current regimen. Can still not tolerate backing down to twice a day. - esomeprazole (NEXIUM) 40 MG delayed release capsule; Take 1 capsule by mouth in the morning and at bedtime  Dispense: 180 capsule; Refill: 1  - Comprehensive Metabolic Panel; Future    3. MDD (major depressive disorder), recurrent, in partial remission (HCC)  Stable, continue current regimen.     - FLUoxetine (PROZAC) 40 MG capsule; Take 1 capsule by mouth daily  Dispense: 90 capsule; Refill: 1    4. Acquired hypothyroidism  Stable, continue current regimen. Check levels. - TSH; Future    5. Allergic rhinitis, unspecified  Stable, continue current regimen. - montelukast (SINGULAIR) 10 MG tablet; TAKE 1 TABLET BY MOUTH NIGHTLY  Dispense: 90 tablet; Refill: 1    6. Avitaminosis D  Stable, continue current regimen. Check levels. - vitamin D (ERGOCALCIFEROL) 1.25 MG (21351 UT) CAPS capsule; Take 1 capsule by mouth every 14 days  Dispense: 12 capsule; Refill: 1  - Vitamin D 25 Hydroxy; Future    7. Hyperlipidemia, unspecified hyperlipidemia type  Stable off meds. - Comprehensive Metabolic Panel;  Future  - Lipid Panel;

## 2023-08-22 RX ORDER — LEVOTHYROXINE SODIUM 0.1 MG/1
100 TABLET ORAL
Qty: 90 TABLET | Refills: 1 | Status: SHIPPED | OUTPATIENT
Start: 2023-08-22

## 2023-08-22 RX ORDER — LEVOTHYROXINE SODIUM 0.1 MG/1
100 TABLET ORAL
Qty: 30 TABLET | Refills: 0 | Status: SHIPPED | OUTPATIENT
Start: 2023-08-22 | End: 2023-08-22 | Stop reason: SDUPTHER

## 2023-09-17 DIAGNOSIS — E03.9 ACQUIRED HYPOTHYROIDISM: ICD-10-CM

## 2023-09-18 RX ORDER — LEVOTHYROXINE SODIUM 0.1 MG/1
100 TABLET ORAL
Qty: 30 TABLET | OUTPATIENT
Start: 2023-09-18

## 2023-12-01 ENCOUNTER — HOSPITAL ENCOUNTER (OUTPATIENT)
Dept: PHYSICAL THERAPY | Age: 58
Setting detail: RECURRING SERIES
Discharge: HOME | End: 2023-12-04
Payer: COMMERCIAL

## 2023-12-01 DIAGNOSIS — M79.604 PAIN IN RIGHT LEG: ICD-10-CM

## 2023-12-01 DIAGNOSIS — M79.661 PAIN IN RIGHT LOWER LEG: ICD-10-CM

## 2023-12-01 DIAGNOSIS — G90.521 COMPLEX REGIONAL PAIN SYNDROME I OF RIGHT LOWER LIMB: Primary | ICD-10-CM

## 2023-12-01 DIAGNOSIS — M62.81 MUSCLE WEAKNESS (GENERALIZED): ICD-10-CM

## 2023-12-01 PROCEDURE — 97161 PT EVAL LOW COMPLEX 20 MIN: CPT

## 2023-12-01 PROCEDURE — 97110 THERAPEUTIC EXERCISES: CPT

## 2023-12-01 ASSESSMENT — PAIN SCALES - GENERAL: PAINLEVEL_OUTOF10: 3

## 2023-12-01 NOTE — THERAPY EVALUATION
Farrah Thorpe Fig  : 1965  Primary: Luís Simeon (Commercial)  Secondary:  [address] @ [facility]  [address] [facility] [address]  Phone: [phone]  Fax: [phone] Plan Frequency: 1x/1-4 wks    Plan of Care/Certification Expiration Date: 24      PT Visit Info:  Plan Frequency: 1x/1-4 wks  Plan of Care/Certification Expiration Date: 24  Total # of Visits Approved: 8 (Will need authorization to scheduled after 8th visit)  PT Insurance Information: Ashley LEEN - need auth after 8th visit! Progress Note Counter: 1 (Will need authorization to scheduled after 8th visit)      Visit Count:  2                OUTPATIENT PHYSICAL THERAPY:             Initial Assessment 2023               Episode (CRPS) Appt Desk         Treatment Diagnosis:    Complex regional pain syndrome i of right lower limb  Muscle weakness (generalized)  Pain in right lower leg  Pain in right leg  Medical/Referring Diagnosis:      Referring Physician:  Jeannette Cabrera MD Orders:  PT Eval and Treat - Requesting aquatic therapy (pt)  Return MD Appt:    Future Appointments   Date Time Provider [address]   2024 10:00 AM Tricia Mac MD PRE GVL AMB   2024  9:30 AM NAZ Berman - TYREL BSNI GVL AMB       Date of Onset:  Onset Date:  (21)       Allergies:  Covid-19 (mrna) vaccine, Fire ant, Uloric [febuxostat], Wasp venom protein, Cetirizine, Echinacea, Fluconazole, Levofloxacin, Lipitor [atorvastatin], Penicillins, Sulfa antibiotics, and Nickel  Restrictions/Precautions:         Medications Last Reviewed:  2023     SUBJECTIVE   History of Injury/Illness (Reason for Referral):  Remi Monte has been to pain management. \"2021, I had foot surgery for my bunion and they did something with the toe next to it and after they took the cast off I had a lot of pain. I couldn't walk for five months.   I had a scooter and then I went to PT with

## 2023-12-01 NOTE — PROGRESS NOTES
Renetta Thao Fig  : 1965  Primary: Vale Garcia (Commercial)  Secondary:  [address] @ [facility]  [address] [facility] [place] [address]  Phone: [phone]  Fax: [phone] Plan Frequency: 1x/1-4 wks  Plan of Care/Certification Expiration Date: 24      >PT Visit Info:  Plan Frequency: 1x/1-4 wks  Plan of Care/Certification Expiration Date: 24  Total # of Visits Approved: 8 (Will need authorization to scheduled after 8th visit)  PT Insurance Information: [employer] RPN - need auth after 8th visit! Progress Note Counter: 1 (Will need authorization to scheduled after 8th visit)      Visit Count:  2    OUTPATIENT PHYSICAL THERAPY: Treatment Note 2023       Episode  }Appt Desk             Treatment Diagnosis:    Complex regional pain syndrome i of right lower limb  Muscle weakness (generalized)  Pain in right lower leg  Pain in right leg  Medical/Referring Diagnosis:      Referring Physician:  MATHEW Marcos MD Orders:  PT Eval and Treat - donning boot currently R LE  Date of Onset:  Onset Date:  (21)     Allergies:   Covid-19 (mrna) vaccine, Fire ant, Uloric [febuxostat], Wasp venom protein, Cetirizine, Echinacea, Fluconazole, Levofloxacin, Lipitor [atorvastatin], Penicillins, Sulfa antibiotics, and Nickel  Restrictions/Precautions:  No data recordedNo data recorded   Interventions Planned (Treatment may consist of any combination of the following):    Current Treatment Recommendations: Strengthening; ROM; Balance training; Functional mobility training; Endurance training; Stair training; Neuromuscular re-education; Manual; Pain management; Home exercise program; Aquatics; Therapeutic activities     >Subjective Comments:  *PT Note, from PT standpoint if after a few visits no change or worsening of symptoms let's refer back to MD to ensure she is progressing. NOTE: she has been in boot for 2 years.   She went to [facility] for land therapy, but no

## 2023-12-06 ENCOUNTER — HOSPITAL ENCOUNTER (OUTPATIENT)
Dept: PHYSICAL THERAPY | Age: 58
Setting detail: RECURRING SERIES
Discharge: HOME | End: 2023-12-09
Payer: COMMERCIAL

## 2023-12-06 PROCEDURE — 97113 AQUATIC THERAPY/EXERCISES: CPT

## 2023-12-06 ASSESSMENT — PAIN SCALES - GENERAL: PAINLEVEL_OUTOF10: 3

## 2023-12-06 NOTE — PROGRESS NOTES
with home exercises. Communication/Consultation:  Therapy Evaluation sent to referring provider  Equipment provided today:  HEP  Recommendations/Intent for next treatment session: Next visit will focus on ROM, strength, aquatic intervention  .     >Total Treatment Billable Duration:  45 minutes  Time In: 0100  Time Out: 129 Cristian Rees PTA       Charge Capture  }Post Session Pain  PT Visit Info  First Class EV Conversions Portal  MD Guidelines  Scanned Media  Benefits  MyChart    Future Appointments   Date Time Provider [address]   12/13/2023 10:15 AM [facility]   12/19/2023 10:15 AM Saúl Wong PTA [facility]   12/27/2023 10:15 AM Kristie Bajwa, PT [facility]   2/21/2024 10:00 AM Ysesenia Olsen MD PRE [facility] AMB   4/16/2024  9:30 AM NAZ Benito - TYREL [facility] GVL AMB

## 2023-12-13 ENCOUNTER — HOSPITAL ENCOUNTER (OUTPATIENT)
Dept: PHYSICAL THERAPY | Age: 58
Setting detail: RECURRING SERIES
Discharge: HOME | End: 2023-12-16
Payer: COMMERCIAL

## 2023-12-13 PROCEDURE — 97113 AQUATIC THERAPY/EXERCISES: CPT

## 2023-12-13 ASSESSMENT — PAIN SCALES - GENERAL: PAINLEVEL_OUTOF10: 4

## 2023-12-13 NOTE — PROGRESS NOTES
below to improve mobility and strength. Required minimal visual and verbal cues to promote proper body alignment, promote proper body posture, and promote proper body mechanics. Progressed resistance and range as indicated. Date:  12/1/23 Date:   Date:     Activity/Exercise Parameters Parameters Parameters   Heel raises sitting 20x     Reviewed stimulator and CRPS desensitization 5'     ROM                                  Aquatic Therapy (45 - minutes): Aquatic treatment performed per flow grid for Decreased muscle strength and Decreased endurance. Cues provided for -. Assistance by therapist provided for -. Patient has difficulty with -. Aquatic Exercise Log       Date  12-6-23 Date  12/13 Date   Date   Date     Activity/ Exercise Parameters Parameters Parameters Parameters Parameters   Walking forward 3 min 5 min      Walking backward        Walking sideways 3 min 5 min        Marching x20 x20        Goose Step          Seated ankle pumps X 10 X 20        Heels          Lunges        Side step squats        LE Exercises          Hip Flex/Ext          Hip Abd/Add X 10 X 10        Hip  flex knee ext  X 10 X 10        Calf raises          Seated ABC's  X 2 X 2        Knee Flex          Squats          Leg Circles X 10 X 20      Star taps   X 20 X 20        Step Ups        UE Exercises          Squeeze In          Push Down          Pull Down          Bicep/Tricep        Rows/Press outs         Chi Positions          Trunk Rotation        Deep H2O/ Noodles          Stabilization          Arms only          Legs only        Chip Sweeney walk        Lower abdominal   work           Cardio          Jogging        Lap   Swimming          Stretches          Hamstrings 2 x 30 sec 3 x 30 sec        Heelcords          Piriformis          Neck            Treatment/Session Summary: 45 min  >Treatment Assessment:  Patient tolerated all aquatic exercises.  We will continue to

## 2023-12-27 ENCOUNTER — HOSPITAL ENCOUNTER (OUTPATIENT)
Dept: PHYSICAL THERAPY | Age: 58
Setting detail: RECURRING SERIES
Discharge: HOME | End: 2023-12-30
Payer: COMMERCIAL

## 2023-12-27 PROCEDURE — 97113 AQUATIC THERAPY/EXERCISES: CPT

## 2023-12-27 NOTE — PROGRESS NOTES
Eagles Mere Mask Fig  : 1965  Primary: Pancho Quiñones (Commercial)  Secondary:  [address] [facility]  [address] [facility] [address]  Phone: [phone]  Fax: [phone] Care/Certification Expiration Date: 24      >PT Visit Info:  Plan of Care/Certification Expiration Date: 24  Total # of Visits Approved: 8 (Will need authorization to scheduled after 8th visit)  PT Insurance Information: [employer] RPN - need auth after 8th visit! Progress Note Counter: 1 (Will need authorization to scheduled after 8th visit)      Visit Count:  Visit count could not be calculated. Make sure you are using a visit which is associated with an episode. Visit count at 5 visits 23. OUTPATIENT PHYSICAL THERAPY: Treatment Note 2023       Episode  }Appt Desk           Treatment Diagnosis:    Complex regional pain syndrome i of right lower limb  Muscle weakness (generalized)  Pain in right lower leg  Pain in right leg  Medical/Referring Diagnosis:     complex regional pain syndrome . Referring Physician:  MATHEW Blackburn MD Orders:  PT Eval and Treat - donning boot currently R LE  Date of Onset:  Onset Date:  (21)     Allergies:   Covid-19 (mrna) vaccine, Fire ant, Uloric [febuxostat], Wasp venom protein, Cetirizine, Echinacea, Fluconazole, Levofloxacin, Lipitor [atorvastatin], Penicillins, Sulfa antibiotics, and Nickel  Restrictions/Precautions:  No data recordedNo data recorded   Interventions Planned (Treatment may consist of any combination of the following):    Current Treatment Recommendations: Strengthening; ROM; Balance training; Functional mobility training; Endurance training; Stair training; Neuromuscular re-education; Manual; Pain management; Home exercise program; Aquatics; Therapeutic activities     >Subjective Comments: If I do to much it flares up, like doing yardwork.          >Initial:      6/10>Post Session:

## 2024-01-10 ENCOUNTER — HOSPITAL ENCOUNTER (OUTPATIENT)
Dept: PHYSICAL THERAPY | Age: 59
Setting detail: RECURRING SERIES
Discharge: HOME | End: 2024-01-13
Payer: COMMERCIAL

## 2024-01-10 PROCEDURE — 97113 AQUATIC THERAPY/EXERCISES: CPT

## 2024-01-10 NOTE — PROGRESS NOTES
Iona BARAJAS Fig  : 1965  Primary: [employer] Rpn (Commercial)  Secondary:  [facility]  [address]  [place] [address]  Phone: [phone]  Fax: [phone]    Plan of Care/Certification Expiration Date: 24      >PT Visit Info:  Plan of Care/Certification Expiration Date: 24  Total # of Visits Approved: 8 (Will need authorization to scheduled after 8th visit)  PT Insurance Information: [employer] RPN - need auth after 8th visit!  Progress Note Counter: 1 (Will need authorization to scheduled after 8th visit)      Visit Count:  Visit count could not be calculated. Make sure you are using a visit which is associated with an episode.     Visit count at 5 visits 23.      OUTPATIENT PHYSICAL THERAPY: Treatment Note 1/10/2024       Episode  }Appt Desk           Treatment Diagnosis:    Complex regional pain syndrome i of right lower limb  Muscle weakness (generalized)  Pain in right lower leg  Pain in right leg  Medical/Referring Diagnosis:     complex regional pain syndrome .    Referring Physician:  Nancy Mario PA MD Orders:  PT Eval and Treat - donning boot currently R LE  Date of Onset:  Onset Date:  (21)     Allergies:   Covid-19 (mrna) vaccine, Fire ant, Uloric [febuxostat], Wasp venom protein, Cetirizine, Echinacea, Fluconazole, Levofloxacin, Lipitor [atorvastatin], Penicillins, Sulfa antibiotics, and Nickel  Restrictions/Precautions:  No data recordedNo data recorded   Interventions Planned (Treatment may consist of any combination of the following):    Current Treatment Recommendations: Strengthening; ROM; Balance training; Functional mobility training; Endurance training; Stair training; Neuromuscular re-education; Manual; Pain management; Home exercise program; Aquatics; Therapeutic activities     >Subjective Comments: Patient reports she had a nerve block and it really seemed to help. She states she hasn't had to wear her boot.

## 2024-01-17 ENCOUNTER — APPOINTMENT (OUTPATIENT)
Dept: PHYSICAL THERAPY | Age: 59
End: 2024-01-17
Payer: COMMERCIAL

## 2024-01-24 ENCOUNTER — HOSPITAL ENCOUNTER (OUTPATIENT)
Dept: PHYSICAL THERAPY | Age: 59
Setting detail: RECURRING SERIES
Discharge: HOME | End: 2024-01-27
Payer: COMMERCIAL

## 2024-01-24 PROCEDURE — 97113 AQUATIC THERAPY/EXERCISES: CPT

## 2024-01-24 NOTE — PROGRESS NOTES
Iona BARAJAS Fig  : 1965  Primary: [employer] Rpn (Commercial)  Secondary:  [facility] @ [facility]  [address]  [place] [address]  Phone: [phone]  Fax: [phone]    Plan of Care/Certification Expiration Date: 24      >PT Visit Info:  Plan of Care/Certification Expiration Date: 24  Total # of Visits Approved: 8 (Will need authorization to scheduled after 8th visit)  PT Insurance Information: [employer] RPN - need auth after 8th visit!  Progress Note Counter: 1 (Will need authorization to scheduled after 8th visit)      Visit Count:  Visit count could not be calculated. Make sure you are using a visit which is associated with an episode.     Visit count at 5 visits 23.      OUTPATIENT PHYSICAL THERAPY: Treatment Note 2024       Episode  }Appt Desk           Treatment Diagnosis:    Complex regional pain syndrome i of right lower limb  Muscle weakness (generalized)  Pain in right lower leg  Pain in right leg  Medical/Referring Diagnosis:     complex regional pain syndrome .    Referring Physician:  Nancy Mario PA MD Orders:  PT Eval and Treat - donning boot currently R LE  Date of Onset:  Onset Date:  (21)     Allergies:   Covid-19 (mrna) vaccine, Fire ant, Uloric [febuxostat], Wasp venom protein, Cetirizine, Echinacea, Fluconazole, Levofloxacin, Lipitor [atorvastatin], Penicillins, Sulfa antibiotics, and Nickel  Restrictions/Precautions:  No data recordedNo data recorded   Interventions Planned (Treatment may consist of any combination of the following):    Current Treatment Recommendations: Strengthening; ROM; Balance training; Functional mobility training; Endurance training; Stair training; Neuromuscular re-education; Manual; Pain management; Home exercise program; Aquatics; Therapeutic activities     >Subjective Comments: Patient reports foot hurt for a while after last visit doing the balance exercises for about a day and a half.

## 2024-02-07 DIAGNOSIS — J30.9 ALLERGIC RHINITIS, UNSPECIFIED SEASONALITY, UNSPECIFIED TRIGGER: ICD-10-CM

## 2024-02-07 RX ORDER — MONTELUKAST SODIUM 10 MG/1
TABLET ORAL
Qty: 90 TABLET | Refills: 3 | OUTPATIENT
Start: 2024-02-07

## 2024-02-21 ENCOUNTER — OFFICE VISIT (OUTPATIENT)
Dept: FAMILY MEDICINE CLINIC | Facility: CLINIC | Age: 59
End: 2024-02-21
Payer: COMMERCIAL

## 2024-02-21 ENCOUNTER — HOSPITAL ENCOUNTER (OUTPATIENT)
Dept: GENERAL RADIOLOGY | Age: 59
Discharge: HOME | End: 2024-02-23
Payer: COMMERCIAL

## 2024-02-21 VITALS
HEIGHT: 66 IN | DIASTOLIC BLOOD PRESSURE: 77 MMHG | WEIGHT: 125 LBS | HEART RATE: 84 BPM | SYSTOLIC BLOOD PRESSURE: 110 MMHG | BODY MASS INDEX: 20.09 KG/M2

## 2024-02-21 DIAGNOSIS — F33.41 MDD (MAJOR DEPRESSIVE DISORDER), RECURRENT, IN PARTIAL REMISSION (HCC): ICD-10-CM

## 2024-02-21 DIAGNOSIS — K21.9 GASTROESOPHAGEAL REFLUX DISEASE WITHOUT ESOPHAGITIS: Primary | ICD-10-CM

## 2024-02-21 DIAGNOSIS — G90.59 COMPLEX REGIONAL PAIN SYNDROME TYPE 1 AFFECTING OTHER SITE: ICD-10-CM

## 2024-02-21 DIAGNOSIS — K21.9 GASTROESOPHAGEAL REFLUX DISEASE WITHOUT ESOPHAGITIS: ICD-10-CM

## 2024-02-21 DIAGNOSIS — E55.9 AVITAMINOSIS D: ICD-10-CM

## 2024-02-21 DIAGNOSIS — Z12.31 SCREENING MAMMOGRAM FOR BREAST CANCER: ICD-10-CM

## 2024-02-21 DIAGNOSIS — R10.84 GENERALIZED ABDOMINAL PAIN: ICD-10-CM

## 2024-02-21 DIAGNOSIS — G43.011 INTRACTABLE MIGRAINE WITHOUT AURA AND WITH STATUS MIGRAINOSUS: ICD-10-CM

## 2024-02-21 DIAGNOSIS — M17.11 OSTEOARTHRITIS OF RIGHT KNEE, UNSPECIFIED OSTEOARTHRITIS TYPE: ICD-10-CM

## 2024-02-21 DIAGNOSIS — E03.9 ACQUIRED HYPOTHYROIDISM: ICD-10-CM

## 2024-02-21 DIAGNOSIS — J30.9 ALLERGIC RHINITIS, UNSPECIFIED SEASONALITY, UNSPECIFIED TRIGGER: ICD-10-CM

## 2024-02-21 PROBLEM — G90.50 COMPLEX REGIONAL PAIN SYNDROME TYPE I: Status: ACTIVE | Noted: 2024-02-21

## 2024-02-21 LAB
25(OH)D3 SERPL-MCNC: 90.2 NG/ML (ref 30–100)
ALBUMIN SERPL-MCNC: 4.2 G/DL (ref 3.2–4.6)
ALBUMIN/GLOB SERPL: 1.4 (ref 0.4–1.6)
ALP SERPL-CCNC: 69 U/L (ref 50–136)
ALT SERPL-CCNC: 31 U/L (ref 12–65)
ANION GAP SERPL CALC-SCNC: 4 MMOL/L (ref 2–11)
AST SERPL-CCNC: 20 U/L (ref 15–37)
BASOPHILS # BLD: 0 K/UL (ref 0–0.2)
BASOPHILS NFR BLD: 0 % (ref 0–2)
BILIRUB SERPL-MCNC: 0.5 MG/DL (ref 0.2–1.1)
BUN SERPL-MCNC: 9 MG/DL (ref 8–23)
CALCIUM SERPL-MCNC: 10 MG/DL (ref 8.3–10.4)
CHLORIDE SERPL-SCNC: 107 MMOL/L (ref 103–113)
CO2 SERPL-SCNC: 31 MMOL/L (ref 21–32)
CREAT SERPL-MCNC: 0.7 MG/DL (ref 0.6–1)
DIFFERENTIAL METHOD BLD: ABNORMAL
EOSINOPHIL # BLD: 0.1 K/UL (ref 0–0.8)
EOSINOPHIL NFR BLD: 2 % (ref 0.5–7.8)
ERYTHROCYTE [DISTWIDTH] IN BLOOD BY AUTOMATED COUNT: 14 % (ref 11.9–14.6)
GLOBULIN SER CALC-MCNC: 3 G/DL (ref 2.8–4.5)
GLUCOSE SERPL-MCNC: 117 MG/DL (ref 65–100)
HCT VFR BLD AUTO: 40.1 % (ref 35.8–46.3)
HGB BLD-MCNC: 12.5 G/DL (ref 11.7–15.4)
IMM GRANULOCYTES # BLD AUTO: 0 K/UL (ref 0–0.5)
IMM GRANULOCYTES NFR BLD AUTO: 0 % (ref 0–5)
LYMPHOCYTES # BLD: 1.4 K/UL (ref 0.5–4.6)
LYMPHOCYTES NFR BLD: 30 % (ref 13–44)
MCH RBC QN AUTO: 28.3 PG (ref 26.1–32.9)
MCHC RBC AUTO-ENTMCNC: 31.2 G/DL (ref 31.4–35)
MCV RBC AUTO: 90.9 FL (ref 82–102)
MONOCYTES # BLD: 0.4 K/UL (ref 0.1–1.3)
MONOCYTES NFR BLD: 9 % (ref 4–12)
NEUTS SEG # BLD: 2.7 K/UL (ref 1.7–8.2)
NEUTS SEG NFR BLD: 59 % (ref 43–78)
NRBC # BLD: 0 K/UL (ref 0–0.2)
PLATELET # BLD AUTO: 208 K/UL (ref 150–450)
PMV BLD AUTO: 11.3 FL (ref 9.4–12.3)
POTASSIUM SERPL-SCNC: 3.8 MMOL/L (ref 3.5–5.1)
PROT SERPL-MCNC: 7.2 G/DL (ref 6.3–8.2)
RBC # BLD AUTO: 4.41 M/UL (ref 4.05–5.2)
SODIUM SERPL-SCNC: 142 MMOL/L (ref 136–146)
TSH, 3RD GENERATION: 0.42 UIU/ML (ref 0.36–3.74)
WBC # BLD AUTO: 4.6 K/UL (ref 4.3–11.1)

## 2024-02-21 PROCEDURE — 74018 RADEX ABDOMEN 1 VIEW: CPT

## 2024-02-21 PROCEDURE — 99214 OFFICE O/P EST MOD 30 MIN: CPT | Performed by: FAMILY MEDICINE

## 2024-02-21 RX ORDER — LEVOTHYROXINE SODIUM 0.1 MG/1
100 TABLET ORAL
Qty: 90 TABLET | Refills: 1 | Status: SHIPPED | OUTPATIENT
Start: 2024-02-21

## 2024-02-21 RX ORDER — MONTELUKAST SODIUM 10 MG/1
TABLET ORAL
Qty: 90 TABLET | Refills: 1 | Status: SHIPPED | OUTPATIENT
Start: 2024-02-21

## 2024-02-21 RX ORDER — ESOMEPRAZOLE MAGNESIUM 40 MG/1
40 CAPSULE, DELAYED RELEASE ORAL 2 TIMES DAILY
Qty: 180 CAPSULE | Refills: 1 | Status: SHIPPED | OUTPATIENT
Start: 2024-02-21

## 2024-02-21 RX ORDER — FLUOXETINE HYDROCHLORIDE 40 MG/1
40 CAPSULE ORAL DAILY
Qty: 90 CAPSULE | Refills: 1 | Status: SHIPPED | OUTPATIENT
Start: 2024-02-21

## 2024-02-21 RX ORDER — ERGOCALCIFEROL 1.25 MG/1
50000 CAPSULE ORAL
Qty: 12 CAPSULE | Refills: 1 | Status: SHIPPED | OUTPATIENT
Start: 2024-02-21

## 2024-02-21 ASSESSMENT — PATIENT HEALTH QUESTIONNAIRE - PHQ9
8. MOVING OR SPEAKING SO SLOWLY THAT OTHER PEOPLE COULD HAVE NOTICED. OR THE OPPOSITE, BEING SO FIGETY OR RESTLESS THAT YOU HAVE BEEN MOVING AROUND A LOT MORE THAN USUAL: 0
1. LITTLE INTEREST OR PLEASURE IN DOING THINGS: 1
9. THOUGHTS THAT YOU WOULD BE BETTER OFF DEAD, OR OF HURTING YOURSELF: 0
7. TROUBLE CONCENTRATING ON THINGS, SUCH AS READING THE NEWSPAPER OR WATCHING TELEVISION: 0
SUM OF ALL RESPONSES TO PHQ9 QUESTIONS 1 & 2: 2
2. FEELING DOWN, DEPRESSED OR HOPELESS: 1
3. TROUBLE FALLING OR STAYING ASLEEP: 0
SUM OF ALL RESPONSES TO PHQ QUESTIONS 1-9: 2
SUM OF ALL RESPONSES TO PHQ QUESTIONS 1-9: 2
4. FEELING TIRED OR HAVING LITTLE ENERGY: 0
5. POOR APPETITE OR OVEREATING: 0
SUM OF ALL RESPONSES TO PHQ QUESTIONS 1-9: 2
SUM OF ALL RESPONSES TO PHQ QUESTIONS 1-9: 2
6. FEELING BAD ABOUT YOURSELF - OR THAT YOU ARE A FAILURE OR HAVE LET YOURSELF OR YOUR FAMILY DOWN: 0

## 2024-02-21 ASSESSMENT — ENCOUNTER SYMPTOMS
ABDOMINAL DISTENTION: 1
CHEST TIGHTNESS: 0
RHINORRHEA: 1
BLOOD IN STOOL: 0
SHORTNESS OF BREATH: 0

## 2024-02-21 NOTE — PROGRESS NOTES
[facility]  _______________________________________  MD Adrianne Dwyer, DO Zarina Fu, MD Kati Gray MD    [address] [place] [address]  Phone: [phone]  Fax: [phone]    Iona Salinas (:  1965) is a 59 y.o. female,Established patient, here for evaluation of the following chief complaint(s):  Thyroid Problem, Depression, and Gastroesophageal Reflux         ASSESSMENT/PLAN:    1. Gastroesophageal reflux disease without esophagitis  Stable, continue current regimen.     - esomeprazole (NEXIUM) 40 MG delayed release capsule; Take 1 capsule by mouth in the morning and at bedtime  Dispense: 180 capsule; Refill: 1  - Comprehensive Metabolic Panel; Future  - CBC with Auto Differential; Future    2. Osteoarthritis of right knee, unspecified osteoarthritis type  Stable, continue current regimen.   FU with PMR as planned.     3. MDD (major depressive disorder), recurrent, in partial remission (HCC)  Mild dysthymia, denies SI/HI, check TSH and labs first before adjusting meds.      - FLUoxetine (PROZAC) 40 MG capsule; Take 1 capsule by mouth daily  Dispense: 90 capsule; Refill: 1    4. Acquired hypothyroidism  Stable, continue current regimen.   Check TSH.   - levothyroxine (SYNTHROID) 100 MCG tablet; Take 1 tablet by mouth every morning (before breakfast)  Dispense: 90 tablet; Refill: 1  - TSH; Future    5. Allergic rhinitis, unspecified seasonality, unspecified trigger  Not fully controlled. FU with allergy.   - montelukast (SINGULAIR) 10 MG tablet; TAKE 1 TABLET BY MOUTH NIGHTLY  Dispense: 90 tablet; Refill: 1    6. Avitaminosis D  Stable, continue current regimen.   Check levels.   - vitamin D (ERGOCALCIFEROL) 1.25 MG (07702 UT) CAPS capsule; Take 1 capsule by mouth every 14 days  Dispense: 12 capsule; Refill: 1  - Comprehensive Metabolic Panel; Future  - Vitamin D 25 Hydroxy; Future    7. Complex regional pain syndrome type

## 2024-04-16 ENCOUNTER — OFFICE VISIT (OUTPATIENT)
Dept: NEUROLOGY | Age: 59
End: 2024-04-16
Payer: COMMERCIAL

## 2024-04-16 VITALS
WEIGHT: 123 LBS | HEART RATE: 78 BPM | OXYGEN SATURATION: 93 % | DIASTOLIC BLOOD PRESSURE: 73 MMHG | BODY MASS INDEX: 19.85 KG/M2 | SYSTOLIC BLOOD PRESSURE: 105 MMHG

## 2024-04-16 DIAGNOSIS — G43.009 MIGRAINE WITHOUT AURA AND WITHOUT STATUS MIGRAINOSUS, NOT INTRACTABLE: Primary | ICD-10-CM

## 2024-04-16 PROBLEM — G43.011 INTRACTABLE MIGRAINE WITHOUT AURA AND WITH STATUS MIGRAINOSUS: Status: RESOLVED | Noted: 2017-06-27 | Resolved: 2024-04-16

## 2024-04-16 PROCEDURE — 99215 OFFICE O/P EST HI 40 MIN: CPT | Performed by: NURSE PRACTITIONER

## 2024-04-16 RX ORDER — ATOGEPANT 60 MG/1
1 TABLET ORAL DAILY
Qty: 90 TABLET | Refills: 3 | Status: SHIPPED | OUTPATIENT
Start: 2024-04-16

## 2024-04-16 RX ORDER — TIZANIDINE 4 MG/1
4 TABLET ORAL EVERY EVENING
Qty: 90 TABLET | Refills: 3 | Status: CANCELLED | OUTPATIENT
Start: 2024-04-16

## 2024-04-16 RX ORDER — RIZATRIPTAN BENZOATE 10 MG/1
10 TABLET, ORALLY DISINTEGRATING ORAL
Qty: 12 TABLET | Refills: 5 | Status: SHIPPED | OUTPATIENT
Start: 2024-04-16 | End: 2024-04-16

## 2024-04-16 RX ORDER — RIMEGEPANT SULFATE 75 MG/75MG
75 TABLET, ORALLY DISINTEGRATING ORAL
Qty: 9 TABLET | Refills: 5 | Status: SHIPPED | OUTPATIENT
Start: 2024-04-16 | End: 2024-04-16

## 2024-04-16 RX ORDER — ONDANSETRON HYDROCHLORIDE 8 MG/1
8 TABLET, FILM COATED ORAL EVERY 8 HOURS PRN
Qty: 12 TABLET | Refills: 5 | Status: SHIPPED | OUTPATIENT
Start: 2024-04-16

## 2024-04-16 ASSESSMENT — ENCOUNTER SYMPTOMS
PHOTOPHOBIA: 1
VOMITING: 0
NAUSEA: 1

## 2024-04-16 NOTE — ASSESSMENT & PLAN NOTE
Continue Qulipta for prevention.  Discussed and counseled on appropriate use of medications like Excedrin and Fioricet as it can contribute to medication overuse headache.    She has tried and failed multiple triptans as well as Ubrelvy.  Will start trial of Nurtec at onset of migraine.  Patient has a degree of nausea and the Nurtec delivery modality method will likely provide faster onset time and pain freedom.  If coverage is approved and this is effective I would recommend discontinuing Maxalt.    Keep migraine diary.  Avoid known triggers.  Could consider formal physical therapy referral for neck pain in future.    Zofran as needed for nausea/vomiting associate with migraine.

## 2024-04-16 NOTE — PROGRESS NOTES
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    2 cone biopsy      GYN      endo ablation and essure tubal    HEENT  1990    TMJ surgery     IMPLANT BREAST SILICONE/EQ      LIPOMA RESECTION Right     right hip/side    ORTHOPEDIC SURGERY Right 08/03/2022    elbow    ORTHOPEDIC SURGERY Right 12/2021    foot    STIMULATOR SURGERY Right     foot     Family History   Problem Relation Age of Onset    Hypertension Mother     Breast Cancer Neg Hx     Ovarian Cancer Neg Hx     Hypertension Maternal Grandmother     Colon Cancer Neg Hx        Current Medications:  Current Outpatient Medications   Medication Sig Dispense Refill    Atogepant (QULIPTA) 60 MG TABS Take 1 tablet by mouth daily 90 tablet 3    ondansetron (ZOFRAN) 8 MG tablet Take 1 tablet by mouth every 8 hours as needed for Nausea 12 tablet 5    rizatriptan (MAXALT-MLT) 10 MG disintegrating tablet Take 1 tablet by mouth once as needed for Migraine PLEASE SEE ATTACHED FOR DETAILED DIRECTIONS 12 tablet 5    rimegepant sulfate (NURTEC) 75 MG TBDP Take 75 mg by mouth once as needed (at onset of migraine) Take 1 tablet by mouth at onset of migraine, do not take more than 1 tablet in 24 hours. 9 tablet 5    esomeprazole (NEXIUM) 40 MG delayed release capsule Take 1 capsule by mouth in the morning and at bedtime 180 capsule 1    FLUoxetine (PROZAC) 40 MG capsule Take 1 capsule by mouth daily 90 capsule 1    levothyroxine (SYNTHROID) 100 MCG tablet Take 1 tablet by mouth every morning (before breakfast) 90 tablet 1    montelukast (SINGULAIR) 10 MG tablet TAKE 1 TABLET BY MOUTH NIGHTLY 90 tablet 1    vitamin D (ERGOCALCIFEROL) 1.25 MG (62933 UT) CAPS capsule Take 1 capsule by mouth every 14 days 12 capsule 1    diclofenac sodium (VOLTAREN) 1 % GEL Apply

## 2024-08-18 SDOH — ECONOMIC STABILITY: INCOME INSECURITY: HOW HARD IS IT FOR YOU TO PAY FOR THE VERY BASICS LIKE FOOD, HOUSING, MEDICAL CARE, AND HEATING?: NOT VERY HARD

## 2024-08-18 SDOH — ECONOMIC STABILITY: FOOD INSECURITY: WITHIN THE PAST 12 MONTHS, THE FOOD YOU BOUGHT JUST DIDN'T LAST AND YOU DIDN'T HAVE MONEY TO GET MORE.: NEVER TRUE

## 2024-08-18 SDOH — ECONOMIC STABILITY: FOOD INSECURITY: WITHIN THE PAST 12 MONTHS, YOU WORRIED THAT YOUR FOOD WOULD RUN OUT BEFORE YOU GOT MONEY TO BUY MORE.: NEVER TRUE

## 2024-08-19 DIAGNOSIS — K21.9 GASTROESOPHAGEAL REFLUX DISEASE WITHOUT ESOPHAGITIS: ICD-10-CM

## 2024-08-19 DIAGNOSIS — E03.9 ACQUIRED HYPOTHYROIDISM: ICD-10-CM

## 2024-08-19 DIAGNOSIS — J30.9 ALLERGIC RHINITIS, UNSPECIFIED SEASONALITY, UNSPECIFIED TRIGGER: ICD-10-CM

## 2024-08-19 DIAGNOSIS — F33.41 MDD (MAJOR DEPRESSIVE DISORDER), RECURRENT, IN PARTIAL REMISSION (HCC): ICD-10-CM

## 2024-08-19 RX ORDER — FLUOXETINE 40 MG/1
40 CAPSULE ORAL DAILY
Qty: 90 CAPSULE | Refills: 3 | OUTPATIENT
Start: 2024-08-19

## 2024-08-19 RX ORDER — LEVOTHYROXINE SODIUM 100 UG/1
100 TABLET ORAL
Qty: 90 TABLET | Refills: 3 | OUTPATIENT
Start: 2024-08-19

## 2024-08-19 RX ORDER — ESOMEPRAZOLE MAGNESIUM 40 MG/1
CAPSULE, DELAYED RELEASE ORAL
Qty: 180 CAPSULE | Refills: 3 | OUTPATIENT
Start: 2024-08-19

## 2024-08-19 RX ORDER — MONTELUKAST SODIUM 10 MG/1
TABLET ORAL
Qty: 90 TABLET | Refills: 3 | OUTPATIENT
Start: 2024-08-19

## 2024-08-21 ENCOUNTER — OFFICE VISIT (OUTPATIENT)
Dept: FAMILY MEDICINE CLINIC | Facility: CLINIC | Age: 59
End: 2024-08-21
Payer: COMMERCIAL

## 2024-08-21 VITALS
BODY MASS INDEX: 19.77 KG/M2 | WEIGHT: 123 LBS | DIASTOLIC BLOOD PRESSURE: 70 MMHG | SYSTOLIC BLOOD PRESSURE: 102 MMHG | HEIGHT: 66 IN

## 2024-08-21 DIAGNOSIS — J30.9 ALLERGIC RHINITIS, UNSPECIFIED SEASONALITY, UNSPECIFIED TRIGGER: ICD-10-CM

## 2024-08-21 DIAGNOSIS — K21.9 GASTROESOPHAGEAL REFLUX DISEASE WITHOUT ESOPHAGITIS: ICD-10-CM

## 2024-08-21 DIAGNOSIS — Z12.31 SCREENING MAMMOGRAM FOR BREAST CANCER: ICD-10-CM

## 2024-08-21 DIAGNOSIS — L82.1 SEBORRHEIC KERATOSES: Primary | ICD-10-CM

## 2024-08-21 DIAGNOSIS — E03.9 ACQUIRED HYPOTHYROIDISM: ICD-10-CM

## 2024-08-21 DIAGNOSIS — E55.9 AVITAMINOSIS D: ICD-10-CM

## 2024-08-21 DIAGNOSIS — F33.41 MDD (MAJOR DEPRESSIVE DISORDER), RECURRENT, IN PARTIAL REMISSION (HCC): ICD-10-CM

## 2024-08-21 LAB
25(OH)D3 SERPL-MCNC: 62.3 NG/ML (ref 30–100)
ALBUMIN SERPL-MCNC: 4.1 G/DL (ref 3.2–4.6)
ALBUMIN/GLOB SERPL: 1.8 (ref 1–1.9)
ALP SERPL-CCNC: 66 U/L (ref 35–104)
ALT SERPL-CCNC: 13 U/L (ref 12–65)
ANION GAP SERPL CALC-SCNC: 8 MMOL/L (ref 9–18)
AST SERPL-CCNC: 22 U/L (ref 15–37)
BASOPHILS # BLD: 0 K/UL (ref 0–0.2)
BASOPHILS NFR BLD: 1 % (ref 0–2)
BILIRUB SERPL-MCNC: 0.3 MG/DL (ref 0–1.2)
BUN SERPL-MCNC: 7 MG/DL (ref 8–23)
CALCIUM SERPL-MCNC: 9.8 MG/DL (ref 8.8–10.2)
CHLORIDE SERPL-SCNC: 103 MMOL/L (ref 98–107)
CO2 SERPL-SCNC: 30 MMOL/L (ref 20–28)
CREAT SERPL-MCNC: 0.64 MG/DL (ref 0.6–1.1)
DIFFERENTIAL METHOD BLD: ABNORMAL
EOSINOPHIL # BLD: 0.1 K/UL (ref 0–0.8)
EOSINOPHIL NFR BLD: 2 % (ref 0.5–7.8)
ERYTHROCYTE [DISTWIDTH] IN BLOOD BY AUTOMATED COUNT: 14.6 % (ref 11.9–14.6)
GLOBULIN SER CALC-MCNC: 2.3 G/DL (ref 2.3–3.5)
GLUCOSE SERPL-MCNC: 83 MG/DL (ref 70–99)
HCT VFR BLD AUTO: 36.3 % (ref 35.8–46.3)
HGB BLD-MCNC: 11.2 G/DL (ref 11.7–15.4)
IMM GRANULOCYTES # BLD AUTO: 0 K/UL (ref 0–0.5)
IMM GRANULOCYTES NFR BLD AUTO: 0 % (ref 0–5)
LYMPHOCYTES # BLD: 1.8 K/UL (ref 0.5–4.6)
LYMPHOCYTES NFR BLD: 30 % (ref 13–44)
MCH RBC QN AUTO: 27.9 PG (ref 26.1–32.9)
MCHC RBC AUTO-ENTMCNC: 30.9 G/DL (ref 31.4–35)
MCV RBC AUTO: 90.5 FL (ref 82–102)
MONOCYTES # BLD: 0.5 K/UL (ref 0.1–1.3)
MONOCYTES NFR BLD: 9 % (ref 4–12)
NEUTS SEG # BLD: 3.5 K/UL (ref 1.7–8.2)
NEUTS SEG NFR BLD: 58 % (ref 43–78)
NRBC # BLD: 0 K/UL (ref 0–0.2)
PLATELET # BLD AUTO: 226 K/UL (ref 150–450)
PMV BLD AUTO: 11.8 FL (ref 9.4–12.3)
POTASSIUM SERPL-SCNC: 4.5 MMOL/L (ref 3.5–5.1)
PROT SERPL-MCNC: 6.4 G/DL (ref 6.3–8.2)
RBC # BLD AUTO: 4.01 M/UL (ref 4.05–5.2)
SODIUM SERPL-SCNC: 141 MMOL/L (ref 136–145)
TSH, 3RD GENERATION: 5.89 UIU/ML (ref 0.27–4.2)
WBC # BLD AUTO: 6 K/UL (ref 4.3–11.1)

## 2024-08-21 PROCEDURE — 99214 OFFICE O/P EST MOD 30 MIN: CPT | Performed by: FAMILY MEDICINE

## 2024-08-21 RX ORDER — LEVOTHYROXINE SODIUM 0.1 MG/1
100 TABLET ORAL
Qty: 90 TABLET | Refills: 1 | Status: SHIPPED | OUTPATIENT
Start: 2024-08-21

## 2024-08-21 RX ORDER — SUCRALFATE 1 G/1
1 TABLET ORAL 3 TIMES DAILY
Qty: 270 TABLET | Refills: 1 | Status: SHIPPED | OUTPATIENT
Start: 2024-08-21

## 2024-08-21 RX ORDER — ERGOCALCIFEROL 1.25 MG/1
50000 CAPSULE ORAL
Qty: 12 CAPSULE | Refills: 1 | Status: SHIPPED | OUTPATIENT
Start: 2024-08-21

## 2024-08-21 RX ORDER — SUCRALFATE 1 G/1
1 TABLET ORAL 3 TIMES DAILY
COMMUNITY
End: 2024-08-21 | Stop reason: SDUPTHER

## 2024-08-21 RX ORDER — FLUOXETINE HYDROCHLORIDE 40 MG/1
40 CAPSULE ORAL DAILY
Qty: 90 CAPSULE | Refills: 1 | Status: SHIPPED | OUTPATIENT
Start: 2024-08-21

## 2024-08-21 RX ORDER — MONTELUKAST SODIUM 10 MG/1
TABLET ORAL
Qty: 90 TABLET | Refills: 1 | Status: SHIPPED | OUTPATIENT
Start: 2024-08-21

## 2024-08-21 RX ORDER — ESOMEPRAZOLE MAGNESIUM 40 MG/1
40 CAPSULE, DELAYED RELEASE ORAL 2 TIMES DAILY
Qty: 180 CAPSULE | Refills: 1 | Status: SHIPPED | OUTPATIENT
Start: 2024-08-21

## 2024-08-21 ASSESSMENT — ENCOUNTER SYMPTOMS
CHEST TIGHTNESS: 0
RHINORRHEA: 1
SHORTNESS OF BREATH: 0
ABDOMINAL DISTENTION: 1
BLOOD IN STOOL: 0

## 2024-08-21 NOTE — PROGRESS NOTES
[facility]  _______________________________________  MD Adrianne Dwyer, DO Zarina Fu, MD Kati Gray MD    [address] [place] [address]  Phone: [phone]  Fax: [phone]    Iona Salinas (:  1965) is a 59 y.o. female,Established patient, here for evaluation of the following chief complaint(s):  Skin Problem, Memory Loss, Thyroid Problem, Depression, and Gastroesophageal Reflux      Assessment & Plan   ASSESSMENT/PLAN:    1. Gastroesophageal reflux disease without esophagitis  Asking GI to see her back as they didn't finish their plan on her (needs EGD vs GES per her last note from them, they never did this).   - sucralfate (CARAFATE) 1 GM tablet; Take 1 tablet by mouth in the morning, at noon, and at bedtime  Dispense: 270 tablet; Refill: 1  - esomeprazole (NEXIUM) 40 MG delayed release capsule; Take 1 capsule by mouth in the morning and at bedtime  Dispense: 180 capsule; Refill: 1  - External Referral To Gastroenterology  - CBC with Auto Differential; Future  - Comprehensive Metabolic Panel; Future    2. MDD (major depressive disorder), recurrent, in partial remission (HCC)  Stable, continue current regimen.     - FLUoxetine (PROZAC) 40 MG capsule; Take 1 capsule by mouth daily  Dispense: 90 capsule; Refill: 1    3. Acquired hypothyroidism  Stable, continue current regimen.   Check levels.   - levothyroxine (SYNTHROID) 100 MCG tablet; Take 1 tablet by mouth every morning (before breakfast)  Dispense: 90 tablet; Refill: 1  - TSH; Future    4. Allergic rhinitis, unspecified seasonality, unspecified trigger  Stable, continue current regimen.     - montelukast (SINGULAIR) 10 MG tablet; TAKE 1 TABLET BY MOUTH NIGHTLY  Dispense: 90 tablet; Refill: 1    5. Avitaminosis D  Stable, continue current regimen.   Check levels.   - vitamin D (ERGOCALCIFEROL) 1.25 MG (61746 UT) CAPS capsule; Take 1 capsule by mouth every 14 days

## 2024-08-22 DIAGNOSIS — D64.9 ANEMIA, UNSPECIFIED TYPE: Primary | ICD-10-CM

## 2024-09-12 ENCOUNTER — HOSPITAL ENCOUNTER (OUTPATIENT)
Dept: MAMMOGRAPHY | Age: 59
Discharge: HOME | End: 2024-09-15
Attending: FAMILY MEDICINE
Payer: COMMERCIAL

## 2024-09-12 DIAGNOSIS — Z12.31 SCREENING MAMMOGRAM FOR BREAST CANCER: ICD-10-CM

## 2024-09-12 PROCEDURE — 77067 SCR MAMMO BI INCL CAD: CPT

## 2024-09-19 ENCOUNTER — HOSPITAL ENCOUNTER (EMERGENCY)
Age: 59
Discharge: HOME | End: 2024-09-19
Attending: EMERGENCY MEDICINE
Payer: COMMERCIAL

## 2024-09-19 VITALS
WEIGHT: 123 LBS | HEIGHT: 66 IN | DIASTOLIC BLOOD PRESSURE: 69 MMHG | BODY MASS INDEX: 19.77 KG/M2 | TEMPERATURE: 98.4 F | SYSTOLIC BLOOD PRESSURE: 111 MMHG | OXYGEN SATURATION: 97 % | RESPIRATION RATE: 16 BRPM | HEART RATE: 75 BPM

## 2024-09-19 DIAGNOSIS — T78.40XA ACUTE ALLERGIC REACTION, INITIAL ENCOUNTER: Primary | ICD-10-CM

## 2024-09-19 DIAGNOSIS — J98.01 ACUTE BRONCHOSPASM: ICD-10-CM

## 2024-09-19 PROCEDURE — 6360000002 HC RX W HCPCS: Performed by: EMERGENCY MEDICINE

## 2024-09-19 PROCEDURE — 2500000003 HC RX 250 WO HCPCS: Performed by: EMERGENCY MEDICINE

## 2024-09-19 PROCEDURE — 99284 EMERGENCY DEPT VISIT MOD MDM: CPT

## 2024-09-19 PROCEDURE — 96374 THER/PROPH/DIAG INJ IV PUSH: CPT

## 2024-09-19 PROCEDURE — 2580000003 HC RX 258: Performed by: EMERGENCY MEDICINE

## 2024-09-19 PROCEDURE — 96375 TX/PRO/DX INJ NEW DRUG ADDON: CPT

## 2024-09-19 RX ORDER — EPINEPHRINE 0.3 MG/.3ML
INJECTION SUBCUTANEOUS
Qty: 1 EACH | Refills: 1 | Status: SHIPPED | OUTPATIENT
Start: 2024-09-19

## 2024-09-19 RX ORDER — ONDANSETRON 2 MG/ML
4 INJECTION INTRAMUSCULAR; INTRAVENOUS
Status: COMPLETED | OUTPATIENT
Start: 2024-09-19 | End: 2024-09-19

## 2024-09-19 RX ORDER — ALBUTEROL SULFATE 90 UG/1
2 INHALANT RESPIRATORY (INHALATION) 4 TIMES DAILY PRN
Qty: 18 G | Refills: 5 | Status: SHIPPED | OUTPATIENT
Start: 2024-09-19

## 2024-09-19 RX ORDER — ALBUTEROL SULFATE 0.83 MG/ML
2.5 SOLUTION RESPIRATORY (INHALATION)
Status: COMPLETED | OUTPATIENT
Start: 2024-09-19 | End: 2024-09-19

## 2024-09-19 RX ORDER — DIPHENHYDRAMINE HYDROCHLORIDE 50 MG/ML
25 INJECTION INTRAMUSCULAR; INTRAVENOUS
Status: COMPLETED | OUTPATIENT
Start: 2024-09-19 | End: 2024-09-19

## 2024-09-19 RX ORDER — PREDNISONE 20 MG/1
40 TABLET ORAL DAILY
Qty: 10 TABLET | Refills: 0 | Status: SHIPPED | OUTPATIENT
Start: 2024-09-19 | End: 2024-09-24

## 2024-09-19 RX ORDER — DEXAMETHASONE SODIUM PHOSPHATE 10 MG/ML
10 INJECTION INTRAMUSCULAR; INTRAVENOUS
Status: COMPLETED | OUTPATIENT
Start: 2024-09-19 | End: 2024-09-19

## 2024-09-19 RX ADMIN — ALBUTEROL SULFATE 2.5 MG: 2.5 SOLUTION RESPIRATORY (INHALATION) at 10:36

## 2024-09-19 RX ADMIN — FAMOTIDINE 20 MG: 10 INJECTION, SOLUTION INTRAVENOUS at 10:16

## 2024-09-19 RX ADMIN — DIPHENHYDRAMINE HYDROCHLORIDE 25 MG: 50 INJECTION INTRAMUSCULAR; INTRAVENOUS at 10:13

## 2024-09-19 RX ADMIN — DEXAMETHASONE SODIUM PHOSPHATE 10 MG: 10 INJECTION INTRAMUSCULAR; INTRAVENOUS at 10:15

## 2024-09-19 RX ADMIN — ONDANSETRON 4 MG: 2 INJECTION INTRAMUSCULAR; INTRAVENOUS at 10:34

## 2024-09-19 ASSESSMENT — PAIN - FUNCTIONAL ASSESSMENT: PAIN_FUNCTIONAL_ASSESSMENT: NONE - DENIES PAIN

## 2024-09-20 ASSESSMENT — ENCOUNTER SYMPTOMS
SHORTNESS OF BREATH: 0
WHEEZING: 0
COUGH: 1

## 2024-10-12 ENCOUNTER — APPOINTMENT (OUTPATIENT)
Dept: GENERAL RADIOLOGY | Age: 59
End: 2024-10-12
Payer: COMMERCIAL

## 2024-10-12 ENCOUNTER — HOSPITAL ENCOUNTER (EMERGENCY)
Age: 59
Discharge: HOME | End: 2024-10-12
Attending: EMERGENCY MEDICINE
Payer: COMMERCIAL

## 2024-10-12 VITALS
HEIGHT: 66 IN | SYSTOLIC BLOOD PRESSURE: 124 MMHG | DIASTOLIC BLOOD PRESSURE: 79 MMHG | WEIGHT: 123 LBS | HEART RATE: 90 BPM | OXYGEN SATURATION: 100 % | BODY MASS INDEX: 19.77 KG/M2 | TEMPERATURE: 97.7 F | RESPIRATION RATE: 17 BRPM

## 2024-10-12 DIAGNOSIS — R07.89 CHEST WALL PAIN: Primary | ICD-10-CM

## 2024-10-12 PROCEDURE — 99284 EMERGENCY DEPT VISIT MOD MDM: CPT

## 2024-10-12 PROCEDURE — 71101 X-RAY EXAM UNILAT RIBS/CHEST: CPT

## 2024-10-12 PROCEDURE — 96372 THER/PROPH/DIAG INJ SC/IM: CPT

## 2024-10-12 PROCEDURE — 6360000002 HC RX W HCPCS: Performed by: EMERGENCY MEDICINE

## 2024-10-12 RX ORDER — LIDOCAINE 50 MG/G
1 PATCH TOPICAL DAILY
Qty: 10 PATCH | Refills: 0 | Status: SHIPPED | OUTPATIENT
Start: 2024-10-12 | End: 2024-10-22

## 2024-10-12 RX ORDER — KETOROLAC TROMETHAMINE 10 MG/1
10 TABLET, FILM COATED ORAL EVERY 6 HOURS PRN
Qty: 20 TABLET | Refills: 0 | Status: SHIPPED | OUTPATIENT
Start: 2024-10-12

## 2024-10-12 RX ORDER — KETOROLAC TROMETHAMINE 30 MG/ML
60 INJECTION, SOLUTION INTRAMUSCULAR; INTRAVENOUS ONCE
Status: COMPLETED | OUTPATIENT
Start: 2024-10-12 | End: 2024-10-12

## 2024-10-12 RX ADMIN — KETOROLAC TROMETHAMINE 60 MG: 60 INJECTION, SOLUTION INTRAMUSCULAR at 08:38

## 2024-10-12 ASSESSMENT — LIFESTYLE VARIABLES
HOW MANY STANDARD DRINKS CONTAINING ALCOHOL DO YOU HAVE ON A TYPICAL DAY: PATIENT DOES NOT DRINK
HOW OFTEN DO YOU HAVE A DRINK CONTAINING ALCOHOL: NEVER

## 2024-10-12 ASSESSMENT — PAIN SCALES - GENERAL
PAINLEVEL_OUTOF10: 7
PAINLEVEL_OUTOF10: 7

## 2024-10-12 ASSESSMENT — PAIN - FUNCTIONAL ASSESSMENT: PAIN_FUNCTIONAL_ASSESSMENT: 0-10

## 2024-10-12 NOTE — ED TRIAGE NOTES
Pr presents to ED with c/o left sided rib pain after going to the chiropractor on Wednesday. Pain with breathing.

## 2024-10-12 NOTE — ED NOTES
Patient mobility status  with no difficulty. Provider aware     I have reviewed discharge instructions with the patient.  The patient verbalized understanding.    Patient left ED via Discharge Method: ambulatory to Home with Child.    Opportunity for questions and clarification provided.     Patient given 2 scripts.

## 2024-10-12 NOTE — ED PROVIDER NOTES
by mouth daily    DICLOFENAC SODIUM (VOLTAREN) 1 % GEL    Apply topically 4 times daily    EPINEPHRINE (EPIPEN) 0.3 MG/0.3ML SOAJ INJECTION        EPINEPHRINE (EPIPEN) 0.3 MG/0.3ML SOAJ INJECTION    Use as directed for allergic reaction    ESOMEPRAZOLE (NEXIUM) 40 MG DELAYED RELEASE CAPSULE    Take 1 capsule by mouth in the morning and at bedtime    FLUOXETINE (PROZAC) 40 MG CAPSULE    Take 1 capsule by mouth daily    FOLIC ACID (FOLVITE) 1 MG TABLET    Take by mouth daily    HYDROCODONE-ACETAMINOPHEN (NORCO) 5-325 MG PER TABLET    Take 1 tablet by mouth every 6 hours as needed for Pain.    HYDROXYCHLOROQUINE (PLAQUENIL) 200 MG TABLET    Take 1 tablet by mouth daily    HYOSCYAMINE SULFATE SL 0.125 MG SUBL    Place 1 tablet under the tongue every 4 hours as needed    LEVOTHYROXINE (SYNTHROID) 100 MCG TABLET    Take 1 tablet by mouth every morning (before breakfast)    LORATADINE (CLARITIN) 10 MG TABLET    Take 1 tablet by mouth daily    MONTELUKAST (SINGULAIR) 10 MG TABLET    TAKE 1 TABLET BY MOUTH NIGHTLY    ONDANSETRON (ZOFRAN) 8 MG TABLET    Take 1 tablet by mouth every 8 hours as needed for Nausea    ONDANSETRON (ZOFRAN-ODT) 8 MG TBDP DISINTEGRATING TABLET    Place 1 tablet under the tongue every 8 hours as needed for Nausea or Vomiting    PREGABALIN (LYRICA) 100 MG CAPSULE    Take 1 capsule by mouth 2 times daily. Trying to wean off, take 1 qam and 1 qp every other night    RIZATRIPTAN (MAXALT-MLT) 10 MG DISINTEGRATING TABLET    Take 1 tablet by mouth once as needed for Migraine PLEASE SEE ATTACHED FOR DETAILED DIRECTIONS    SUCRALFATE (CARAFATE) 1 GM TABLET    Take 1 tablet by mouth in the morning, at noon, and at bedtime    TIZANIDINE (ZANAFLEX) 4 MG TABLET    Take 1 tablet by mouth every evening    VITAMIN D (ERGOCALCIFEROL) 1.25 MG (19104 UT) CAPS CAPSULE    Take 1 capsule by mouth every 14 days        Results for orders placed or performed during the hospital encounter of 10/12/24   XR RIBS LEFT INCLUDE

## 2024-11-19 ENCOUNTER — TELEPHONE (OUTPATIENT)
Dept: NEUROLOGY | Age: 59
End: 2024-11-19

## 2024-11-19 ENCOUNTER — OFFICE VISIT (OUTPATIENT)
Dept: NEUROLOGY | Age: 59
End: 2024-11-19
Payer: COMMERCIAL

## 2024-11-19 VITALS
OXYGEN SATURATION: 98 % | HEART RATE: 75 BPM | BODY MASS INDEX: 20.57 KG/M2 | HEIGHT: 66 IN | DIASTOLIC BLOOD PRESSURE: 66 MMHG | SYSTOLIC BLOOD PRESSURE: 99 MMHG | WEIGHT: 128 LBS

## 2024-11-19 DIAGNOSIS — G43.009 MIGRAINE WITHOUT AURA AND WITHOUT STATUS MIGRAINOSUS, NOT INTRACTABLE: Primary | ICD-10-CM

## 2024-11-19 PROCEDURE — 99214 OFFICE O/P EST MOD 30 MIN: CPT | Performed by: NURSE PRACTITIONER

## 2024-11-19 RX ORDER — RIMEGEPANT SULFATE 75 MG/75MG
75 TABLET, ORALLY DISINTEGRATING ORAL
Qty: 27 TABLET | Refills: 0 | Status: SHIPPED | OUTPATIENT
Start: 2024-11-19 | End: 2024-11-19

## 2024-11-19 RX ORDER — ONDANSETRON 8 MG/1
8 TABLET, ORALLY DISINTEGRATING ORAL EVERY 8 HOURS PRN
Qty: 15 TABLET | Refills: 3 | Status: SHIPPED | OUTPATIENT
Start: 2024-11-19

## 2024-11-19 RX ORDER — FREMANEZUMAB-VFRM 225 MG/1.5ML
225 INJECTION SUBCUTANEOUS
Qty: 4.5 ML | Refills: 0 | Status: SHIPPED | OUTPATIENT
Start: 2024-11-19 | End: 2024-12-19

## 2024-11-19 RX ORDER — RIMEGEPANT SULFATE 75 MG/75MG
TABLET, ORALLY DISINTEGRATING ORAL
COMMUNITY
End: 2024-11-19 | Stop reason: SDUPTHER

## 2024-11-19 ASSESSMENT — ENCOUNTER SYMPTOMS
NAUSEA: 1
PHOTOPHOBIA: 1
VOMITING: 0

## 2024-11-19 NOTE — ASSESSMENT & PLAN NOTE
Add Ajovy 225mg for prevention. She has failed multiple first line preventative agents and Ajovy is the next reasonable choice. Continue Nurtec at onset of migraine.     Keep migraine diary.  Avoid known triggers.  Could consider formal physical therapy referral for neck pain in future.     Zofran as needed for nausea/vomiting associate with migraine.

## 2024-11-19 NOTE — PROGRESS NOTES
Uloric [Febuxostat] Anaphylaxis    Wasp Venom Protein Anaphylaxis    Cetirizine Other (See Comments)     Stomach cramps vaginal bleeding    Echinacea Swelling     Throat swelling     Fluconazole Other (See Comments)    Levofloxacin Other (See Comments)     Drops her blood pressure    Lipitor [Atorvastatin]      Weakness, memory    Penicillins Other (See Comments)    Leila Oil Swelling    Sulfa Antibiotics Other (See Comments)    Nickel Rash       Social History:  Social History     Socioeconomic History    Marital status:    Tobacco Use    Smoking status: Former     Current packs/day: 0.50     Types: Cigarettes    Smokeless tobacco: Former    Tobacco comments:     Quit smokin21 states has quit vaping   Substance and Sexual Activity    Alcohol use: No    Drug use: No     Social Determinants of Health     Financial Resource Strain: Low Risk  (2024)    Overall Financial Resource Strain (CARDIA)     Difficulty of Paying Living Expenses: Not very hard   Food Insecurity: No Food Insecurity (2024)    Hunger Vital Sign     Worried About Running Out of Food in the Last Year: Never true     Ran Out of Food in the Last Year: Never true   Transportation Needs: Unknown (2024)    PRAPARE - Transportation     Lack of Transportation (Non-Medical): No   Social Connections: Unknown (3/19/2021)    Received from [employer]    Social Connections     Frequency of Communication with Friends and Family: Not asked     Frequency of Social Gatherings with Friends and Family: Not asked   Intimate Partner Violence: Unknown (3/19/2021)    Received from [employer]    Intimate Partner Violence     Fear of Current or Ex-Partner: Not asked     Emotionally Abused: Not asked     Physically Abused: Not asked     Sexually Abused: Not asked   Housing Stability: Unknown (2024)    Housing Stability Vital Sign     Homeless in the Last Year: No       Family History:  Family History

## 2024-11-26 ENCOUNTER — OFFICE VISIT (OUTPATIENT)
Dept: FAMILY MEDICINE CLINIC | Facility: CLINIC | Age: 59
End: 2024-11-26
Payer: COMMERCIAL

## 2024-11-26 ENCOUNTER — LAB (OUTPATIENT)
Dept: FAMILY MEDICINE CLINIC | Facility: CLINIC | Age: 59
End: 2024-11-26

## 2024-11-26 VITALS
SYSTOLIC BLOOD PRESSURE: 122 MMHG | BODY MASS INDEX: 20.25 KG/M2 | WEIGHT: 126 LBS | HEIGHT: 66 IN | DIASTOLIC BLOOD PRESSURE: 59 MMHG | HEART RATE: 77 BPM

## 2024-11-26 DIAGNOSIS — E03.9 ACQUIRED HYPOTHYROIDISM: ICD-10-CM

## 2024-11-26 DIAGNOSIS — E55.9 AVITAMINOSIS D: ICD-10-CM

## 2024-11-26 DIAGNOSIS — M17.11 OSTEOARTHRITIS OF RIGHT KNEE, UNSPECIFIED OSTEOARTHRITIS TYPE: ICD-10-CM

## 2024-11-26 DIAGNOSIS — F33.41 MDD (MAJOR DEPRESSIVE DISORDER), RECURRENT, IN PARTIAL REMISSION (HCC): ICD-10-CM

## 2024-11-26 DIAGNOSIS — D64.9 ANEMIA, UNSPECIFIED TYPE: ICD-10-CM

## 2024-11-26 DIAGNOSIS — K21.9 GASTROESOPHAGEAL REFLUX DISEASE WITHOUT ESOPHAGITIS: ICD-10-CM

## 2024-11-26 DIAGNOSIS — J30.9 ALLERGIC RHINITIS, UNSPECIFIED SEASONALITY, UNSPECIFIED TRIGGER: ICD-10-CM

## 2024-11-26 DIAGNOSIS — D64.9 ANEMIA, UNSPECIFIED TYPE: Primary | ICD-10-CM

## 2024-11-26 PROBLEM — J41.0 SIMPLE CHRONIC BRONCHITIS (HCC): Status: RESOLVED | Noted: 2018-04-18 | Resolved: 2024-11-26

## 2024-11-26 PROBLEM — A08.4 STOMACH FLU: Status: RESOLVED | Noted: 2021-01-06 | Resolved: 2024-11-26

## 2024-11-26 PROBLEM — R41.3 MEMORY PROBLEM: Status: RESOLVED | Noted: 2022-12-13 | Resolved: 2024-11-26

## 2024-11-26 PROBLEM — R68.84 JAW PAIN: Status: RESOLVED | Noted: 2018-01-02 | Resolved: 2024-11-26

## 2024-11-26 PROBLEM — R61 NIGHT SWEATS: Status: RESOLVED | Noted: 2018-04-18 | Resolved: 2024-11-26

## 2024-11-26 LAB
25(OH)D3 SERPL-MCNC: 59.2 NG/ML (ref 30–100)
ALBUMIN SERPL-MCNC: 4.1 G/DL (ref 3.2–4.6)
ALBUMIN/GLOB SERPL: 1.6 (ref 1–1.9)
ALP SERPL-CCNC: 68 U/L (ref 35–104)
ALT SERPL-CCNC: 13 U/L (ref 8–45)
ANION GAP SERPL CALC-SCNC: 9 MMOL/L (ref 7–16)
AST SERPL-CCNC: 18 U/L (ref 15–37)
BASOPHILS # BLD: 0 K/UL (ref 0–0.2)
BASOPHILS NFR BLD: 1 % (ref 0–2)
BILIRUB SERPL-MCNC: 0.5 MG/DL (ref 0–1.2)
BUN SERPL-MCNC: 9 MG/DL (ref 8–23)
CALCIUM SERPL-MCNC: 9.6 MG/DL (ref 8.8–10.2)
CHLORIDE SERPL-SCNC: 106 MMOL/L (ref 98–107)
CO2 SERPL-SCNC: 27 MMOL/L (ref 20–29)
CREAT SERPL-MCNC: 0.63 MG/DL (ref 0.6–1.1)
DIFFERENTIAL METHOD BLD: ABNORMAL
EOSINOPHIL # BLD: 0.1 K/UL (ref 0–0.8)
EOSINOPHIL NFR BLD: 2 % (ref 0.5–7.8)
ERYTHROCYTE [DISTWIDTH] IN BLOOD BY AUTOMATED COUNT: 16 % (ref 11.9–14.6)
FERRITIN SERPL-MCNC: 10 NG/ML (ref 8–388)
GLOBULIN SER CALC-MCNC: 2.6 G/DL (ref 2.3–3.5)
GLUCOSE SERPL-MCNC: 87 MG/DL (ref 70–99)
HCT VFR BLD AUTO: 36.9 % (ref 35.8–46.3)
HGB BLD-MCNC: 11.5 G/DL (ref 11.7–15.4)
IMM GRANULOCYTES # BLD AUTO: 0 K/UL (ref 0–0.5)
IMM GRANULOCYTES NFR BLD AUTO: 0 % (ref 0–5)
IRON SATN MFR SERPL: 15 % (ref 20–50)
IRON SERPL-MCNC: 58 UG/DL (ref 35–100)
LYMPHOCYTES # BLD: 1.8 K/UL (ref 0.5–4.6)
LYMPHOCYTES NFR BLD: 30 % (ref 13–44)
MCH RBC QN AUTO: 27.6 PG (ref 26.1–32.9)
MCHC RBC AUTO-ENTMCNC: 31.2 G/DL (ref 31.4–35)
MCV RBC AUTO: 88.5 FL (ref 82–102)
MONOCYTES # BLD: 0.6 K/UL (ref 0.1–1.3)
MONOCYTES NFR BLD: 10 % (ref 4–12)
NEUTS SEG # BLD: 3.4 K/UL (ref 1.7–8.2)
NEUTS SEG NFR BLD: 57 % (ref 43–78)
NRBC # BLD: 0 K/UL (ref 0–0.2)
PLATELET # BLD AUTO: 197 K/UL (ref 150–450)
PMV BLD AUTO: 11.4 FL (ref 9.4–12.3)
POTASSIUM SERPL-SCNC: 4.2 MMOL/L (ref 3.5–5.1)
PROT SERPL-MCNC: 6.7 G/DL (ref 6.3–8.2)
RBC # BLD AUTO: 4.17 M/UL (ref 4.05–5.2)
SODIUM SERPL-SCNC: 142 MMOL/L (ref 136–145)
TIBC SERPL-MCNC: 391 UG/DL (ref 240–450)
TSH, 3RD GENERATION: 2.71 UIU/ML (ref 0.27–4.2)
UIBC SERPL-MCNC: 333 UG/DL (ref 112–347)
WBC # BLD AUTO: 5.9 K/UL (ref 4.3–11.1)

## 2024-11-26 PROCEDURE — 99214 OFFICE O/P EST MOD 30 MIN: CPT | Performed by: FAMILY MEDICINE

## 2024-11-26 RX ORDER — MONTELUKAST SODIUM 10 MG/1
TABLET ORAL
Qty: 90 TABLET | Refills: 1 | Status: SHIPPED | OUTPATIENT
Start: 2024-11-26

## 2024-11-26 RX ORDER — LEVOTHYROXINE SODIUM 100 UG/1
100 TABLET ORAL
Qty: 90 TABLET | Refills: 1 | Status: CANCELLED | OUTPATIENT
Start: 2024-11-26

## 2024-11-26 RX ORDER — ERGOCALCIFEROL 1.25 MG/1
50000 CAPSULE, LIQUID FILLED ORAL
Qty: 12 CAPSULE | Refills: 1 | Status: SHIPPED | OUTPATIENT
Start: 2024-11-26

## 2024-11-26 RX ORDER — ESOMEPRAZOLE MAGNESIUM 40 MG/1
40 CAPSULE, DELAYED RELEASE ORAL 2 TIMES DAILY
Qty: 180 CAPSULE | Refills: 1 | Status: SHIPPED | OUTPATIENT
Start: 2024-11-26

## 2024-11-26 RX ORDER — FLUOXETINE 40 MG/1
40 CAPSULE ORAL DAILY
Qty: 90 CAPSULE | Refills: 1 | Status: SHIPPED | OUTPATIENT
Start: 2024-11-26

## 2024-11-26 ASSESSMENT — ENCOUNTER SYMPTOMS
SHORTNESS OF BREATH: 0
BLOOD IN STOOL: 0
CHEST TIGHTNESS: 0

## 2024-11-26 NOTE — PROGRESS NOTES
[facility]  _______________________________________  MD Adrianne Dwyer, DO Zarina Fu, MD Kati Gray MD    [address] [place] [address]  Phone: [phone]  Fax: [phone]    Iona Salinas (:  1965) is a 59 y.o. female,Established patient, here for evaluation of the following chief complaint(s):  Follow-up and Immunizations (Asked about getting RSV immunization )      Assessment & Plan   ASSESSMENT/PLAN:    1. Avitaminosis D  Stable, continue current regimen.   Check levels.   - vitamin D (ERGOCALCIFEROL) 1.25 MG (07946 UT) CAPS capsule; Take 1 capsule by mouth every 14 days  Dispense: 12 capsule; Refill: 1  - Vitamin D 25 Hydroxy; Future  - Comprehensive Metabolic Panel; Future    2. Allergic rhinitis, unspecified seasonality, unspecified trigger  Stable, continue current regimen.     - montelukast (SINGULAIR) 10 MG tablet; TAKE 1 TABLET BY MOUTH NIGHTLY  Dispense: 90 tablet; Refill: 1    3. Acquired hypothyroidism  Recheck today.   - TSH; Future    4. MDD (major depressive disorder), recurrent, in partial remission (HCC)  Stable, continue current regimen.   She contracts for safety.   - FLUoxetine (PROZAC) 40 MG capsule; Take 1 capsule by mouth daily  Dispense: 90 capsule; Refill: 1    5. Gastroesophageal reflux disease without esophagitis  Stable, continue current regimen.     - esomeprazole (NEXIUM) 40 MG delayed release capsule; Take 1 capsule by mouth in the morning and at bedtime  Dispense: 180 capsule; Refill: 1  - CBC with Auto Differential; Future  - Comprehensive Metabolic Panel; Future    6. Osteoarthritis of right knee, unspecified osteoarthritis type  Stable, continue current regimen.       7. Anemia, unspecified type  Gave her FOBT kit, blood work as below.   - CBC with Auto Differential; Future  - Iron and TIBC; Future  - Ferritin; Future        Return in about 3 months (around 2025).      Subjective

## 2024-12-03 DIAGNOSIS — D64.9 ANEMIA, UNSPECIFIED TYPE: Primary | ICD-10-CM

## 2024-12-03 LAB
FECAL OCCULT BLOOD #2, POC: NEGATIVE
FECAL OCCULT BLOOD #3, POC: NEGATIVE
HEMOCCULT STL QL: NEGATIVE
VALID INTERNAL CONTROL: YES

## 2024-12-03 PROCEDURE — 82270 OCCULT BLOOD FECES: CPT | Performed by: FAMILY MEDICINE

## 2025-02-26 ENCOUNTER — OFFICE VISIT (OUTPATIENT)
Dept: FAMILY MEDICINE CLINIC | Facility: CLINIC | Age: 60
End: 2025-02-26
Payer: COMMERCIAL

## 2025-02-26 VITALS
DIASTOLIC BLOOD PRESSURE: 70 MMHG | SYSTOLIC BLOOD PRESSURE: 112 MMHG | HEIGHT: 66 IN | WEIGHT: 127 LBS | BODY MASS INDEX: 20.41 KG/M2

## 2025-02-26 DIAGNOSIS — J30.9 ALLERGIC RHINITIS, UNSPECIFIED SEASONALITY, UNSPECIFIED TRIGGER: ICD-10-CM

## 2025-02-26 DIAGNOSIS — E55.9 AVITAMINOSIS D: ICD-10-CM

## 2025-02-26 DIAGNOSIS — K21.9 GASTROESOPHAGEAL REFLUX DISEASE WITHOUT ESOPHAGITIS: ICD-10-CM

## 2025-02-26 DIAGNOSIS — E03.9 ACQUIRED HYPOTHYROIDISM: ICD-10-CM

## 2025-02-26 DIAGNOSIS — F33.41 MDD (MAJOR DEPRESSIVE DISORDER), RECURRENT, IN PARTIAL REMISSION: ICD-10-CM

## 2025-02-26 PROCEDURE — 99214 OFFICE O/P EST MOD 30 MIN: CPT | Performed by: FAMILY MEDICINE

## 2025-02-26 RX ORDER — LEVOTHYROXINE SODIUM 100 UG/1
100 TABLET ORAL
Qty: 90 TABLET | Refills: 1 | Status: SHIPPED | OUTPATIENT
Start: 2025-02-26

## 2025-02-26 RX ORDER — MONTELUKAST SODIUM 10 MG/1
TABLET ORAL
Qty: 90 TABLET | Refills: 1 | Status: SHIPPED | OUTPATIENT
Start: 2025-02-26

## 2025-02-26 RX ORDER — ERGOCALCIFEROL 1.25 MG/1
50000 CAPSULE, LIQUID FILLED ORAL
Qty: 12 CAPSULE | Refills: 1 | Status: SHIPPED | OUTPATIENT
Start: 2025-02-26

## 2025-02-26 RX ORDER — FLUOXETINE HYDROCHLORIDE 40 MG/1
40 CAPSULE ORAL DAILY
Qty: 90 CAPSULE | Refills: 1 | Status: SHIPPED | OUTPATIENT
Start: 2025-02-26

## 2025-02-26 RX ORDER — ESOMEPRAZOLE MAGNESIUM 40 MG/1
40 CAPSULE, DELAYED RELEASE ORAL 2 TIMES DAILY
Qty: 180 CAPSULE | Refills: 1 | Status: SHIPPED | OUTPATIENT
Start: 2025-02-26

## 2025-02-26 SDOH — ECONOMIC STABILITY: FOOD INSECURITY: WITHIN THE PAST 12 MONTHS, THE FOOD YOU BOUGHT JUST DIDN'T LAST AND YOU DIDN'T HAVE MONEY TO GET MORE.: PATIENT DECLINED

## 2025-02-26 SDOH — ECONOMIC STABILITY: FOOD INSECURITY: WITHIN THE PAST 12 MONTHS, YOU WORRIED THAT YOUR FOOD WOULD RUN OUT BEFORE YOU GOT MONEY TO BUY MORE.: PATIENT DECLINED

## 2025-02-26 ASSESSMENT — PATIENT HEALTH QUESTIONNAIRE - PHQ9
7. TROUBLE CONCENTRATING ON THINGS, SUCH AS READING THE NEWSPAPER OR WATCHING TELEVISION: NOT AT ALL
4. FEELING TIRED OR HAVING LITTLE ENERGY: NOT AT ALL
SUM OF ALL RESPONSES TO PHQ QUESTIONS 1-9: 0
2. FEELING DOWN, DEPRESSED OR HOPELESS: NOT AT ALL
3. TROUBLE FALLING OR STAYING ASLEEP: NOT AT ALL
SUM OF ALL RESPONSES TO PHQ9 QUESTIONS 1 & 2: 0
SUM OF ALL RESPONSES TO PHQ QUESTIONS 1-9: 0
6. FEELING BAD ABOUT YOURSELF - OR THAT YOU ARE A FAILURE OR HAVE LET YOURSELF OR YOUR FAMILY DOWN: NOT AT ALL
SUM OF ALL RESPONSES TO PHQ QUESTIONS 1-9: 0
5. POOR APPETITE OR OVEREATING: NOT AT ALL
SUM OF ALL RESPONSES TO PHQ QUESTIONS 1-9: 0
1. LITTLE INTEREST OR PLEASURE IN DOING THINGS: NOT AT ALL

## 2025-02-26 ASSESSMENT — ENCOUNTER SYMPTOMS
SHORTNESS OF BREATH: 0
CHEST TIGHTNESS: 0
BLOOD IN STOOL: 0

## 2025-02-26 NOTE — PROGRESS NOTES
[facility]  _______________________________________  MD Adrianne Dwyer, DO Zarina Fu, MD Kati Gray MD    [address] [place] [address]  Phone: [phone]  Fax: [phone]    Iona Salinas (:  1965) is a 60 y.o. female,Established patient, here for evaluation of the following chief complaint(s):  Gastroesophageal Reflux, Depression, Thyroid Problem, and Allergies      Assessment & Plan   ASSESSMENT/PLAN:    1. Avitaminosis D  Will check her levels next visit, she just gave a lot of blood yesterday.   - vitamin D (ERGOCALCIFEROL) 1.25 MG (92676 UT) CAPS capsule; Take 1 capsule by mouth every 14 days  Dispense: 12 capsule; Refill: 1    2. Gastroesophageal reflux disease without esophagitis  Worse, but being followed closely by GI, there is a CT tomorrow. Will follow.   - esomeprazole (NEXIUM) 40 MG delayed release capsule; Take 1 capsule by mouth in the morning and at bedtime  Dispense: 180 capsule; Refill: 1    3. Allergic rhinitis, unspecified seasonality, unspecified trigger  Stable, continue current regimen.   FU with allergy as planned.   - montelukast (SINGULAIR) 10 MG tablet; TAKE 1 TABLET BY MOUTH NIGHTLY  Dispense: 90 tablet; Refill: 1    4. Acquired hypothyroidism  Stable, continue current regimen.   Check thyroid levels next OV.   - levothyroxine (SYNTHROID) 100 MCG tablet; Take 1 tablet by mouth every morning (before breakfast)  Dispense: 90 tablet; Refill: 1    5. MDD (major depressive disorder), recurrent, in partial remission  Stable, continue current regimen.   She asked about going to 20mg but not yet. I told her all she has to do is ask and I will call in 20mg caps to se how she does this.   - FLUoxetine (PROZAC) 40 MG capsule; Take 1 capsule by mouth daily  Dispense: 90 capsule; Refill: 1        Return in about 3 months (around 2025) for Recheck thyroid.      Subjective   SUBJECTIVE/OBJECTIVE:

## 2025-03-05 ENCOUNTER — OFFICE VISIT (OUTPATIENT)
Dept: NEUROLOGY | Age: 60
End: 2025-03-05
Payer: COMMERCIAL

## 2025-03-05 VITALS
OXYGEN SATURATION: 98 % | HEART RATE: 85 BPM | BODY MASS INDEX: 20.66 KG/M2 | DIASTOLIC BLOOD PRESSURE: 67 MMHG | SYSTOLIC BLOOD PRESSURE: 125 MMHG | WEIGHT: 128 LBS

## 2025-03-05 DIAGNOSIS — G43.009 MIGRAINE WITHOUT AURA AND WITHOUT STATUS MIGRAINOSUS, NOT INTRACTABLE: Primary | ICD-10-CM

## 2025-03-05 PROBLEM — G43.809 CERVICAL MIGRAINE SYNDROME: Status: RESOLVED | Noted: 2018-08-06 | Resolved: 2025-03-05

## 2025-03-05 PROBLEM — R51.9 UNILATERAL HEADACHE: Status: RESOLVED | Noted: 2018-03-27 | Resolved: 2025-03-05

## 2025-03-05 PROCEDURE — 99214 OFFICE O/P EST MOD 30 MIN: CPT | Performed by: NURSE PRACTITIONER

## 2025-03-05 RX ORDER — FREMANEZUMAB-VFRM 225 MG/1.5ML
225 INJECTION SUBCUTANEOUS
Qty: 225 ADJUSTABLE DOSE PRE-FILLED PEN SYRINGE | Refills: 5 | Status: SHIPPED | OUTPATIENT
Start: 2025-03-05

## 2025-03-05 RX ORDER — ONDANSETRON 8 MG/1
8 TABLET, ORALLY DISINTEGRATING ORAL EVERY 8 HOURS PRN
Qty: 15 TABLET | Refills: 5 | Status: SHIPPED | OUTPATIENT
Start: 2025-03-05

## 2025-03-05 RX ORDER — FREMANEZUMAB-VFRM 225 MG/1.5ML
225 INJECTION SUBCUTANEOUS
COMMUNITY
Start: 2024-12-06 | End: 2025-03-05 | Stop reason: SDUPTHER

## 2025-03-05 RX ORDER — RIMEGEPANT SULFATE 75 MG/75MG
TABLET, ORALLY DISINTEGRATING ORAL
Qty: 9 TABLET | Refills: 5 | Status: SHIPPED | OUTPATIENT
Start: 2025-03-05

## 2025-03-05 ASSESSMENT — ENCOUNTER SYMPTOMS
NAUSEA: 1
PHOTOPHOBIA: 1
VOMITING: 0

## 2025-03-05 NOTE — ASSESSMENT & PLAN NOTE
Patient with greater than 75% reduction in migraine frequency with the addition of Ajovy.  Her injection site reactions have improved.  Regarding her reported symptoms of feeling poorly, advised we can monitor this or we could switch to Vypeti.  Also discussed that this can be discontinued if she chooses not to proceed. She would like to continue on Ajovy      Keep migraine diary.  Avoid known triggers. Continue Nurtec at onset of migraine.     Zofran as needed for nausea/vomiting associate with migraine.

## 2025-03-05 NOTE — PROGRESS NOTES
migraine.     Zofran as needed for nausea/vomiting associate with migraine.                Headache Education:     To avoid a pain medication overuse headache trying not to take pain medicines more than 2-3 doses a week. To help relieve headache symptoms without taking pain medicine lie down in a darkroom and drink glass of water.  Consider lifestyle modification including good sleep hygiene, routine medial schedules, regular exercise and managing triggers.  Keep a headache diary  to reveal triggers and possible patterns. Migraine buddy is a great resource for this. It can be found on the pam store.   Triggers may be: Food, stress, perfumes, alcohol, or even chocolate.  Drink plenty of water and try to get 8 hours of sleep each night to reduce risk factors that may cause headaches.       The patient (or guardian, if applicable) and other individuals in attendance with the patient were advised that Artificial Intelligence will be utilized during this visit to record, process the conversation to generate a clinical note, and support improvement of the AI technology. The patient (or guardian, if applicable) and other individuals in attendance at the appointment consented to the use of AI, including the recording.      [facility]

## 2025-05-22 ENCOUNTER — OFFICE VISIT (OUTPATIENT)
Dept: FAMILY MEDICINE CLINIC | Facility: CLINIC | Age: 60
End: 2025-05-22
Payer: COMMERCIAL

## 2025-05-22 ENCOUNTER — LAB (OUTPATIENT)
Dept: FAMILY MEDICINE CLINIC | Facility: CLINIC | Age: 60
End: 2025-05-22

## 2025-05-22 VITALS
WEIGHT: 120 LBS | HEIGHT: 66 IN | BODY MASS INDEX: 19.29 KG/M2 | HEART RATE: 79 BPM | DIASTOLIC BLOOD PRESSURE: 67 MMHG | SYSTOLIC BLOOD PRESSURE: 112 MMHG

## 2025-05-22 DIAGNOSIS — J30.9 ALLERGIC RHINITIS, UNSPECIFIED SEASONALITY, UNSPECIFIED TRIGGER: ICD-10-CM

## 2025-05-22 DIAGNOSIS — K21.9 GASTROESOPHAGEAL REFLUX DISEASE WITHOUT ESOPHAGITIS: ICD-10-CM

## 2025-05-22 DIAGNOSIS — F33.41 MDD (MAJOR DEPRESSIVE DISORDER), RECURRENT, IN PARTIAL REMISSION: ICD-10-CM

## 2025-05-22 DIAGNOSIS — E03.9 ACQUIRED HYPOTHYROIDISM: ICD-10-CM

## 2025-05-22 DIAGNOSIS — E55.9 AVITAMINOSIS D: Primary | ICD-10-CM

## 2025-05-22 DIAGNOSIS — E55.9 AVITAMINOSIS D: ICD-10-CM

## 2025-05-22 LAB
25(OH)D3 SERPL-MCNC: 51.9 NG/ML (ref 30–100)
ALBUMIN SERPL-MCNC: 4.1 G/DL (ref 3.2–4.6)
ALBUMIN/GLOB SERPL: 1.8 (ref 1–1.9)
ALP SERPL-CCNC: 70 U/L (ref 35–104)
ALT SERPL-CCNC: 14 U/L (ref 8–45)
ANION GAP SERPL CALC-SCNC: 10 MMOL/L (ref 7–16)
AST SERPL-CCNC: 19 U/L (ref 15–37)
BASOPHILS # BLD: 0.02 K/UL (ref 0–0.2)
BASOPHILS NFR BLD: 0.3 % (ref 0–2)
BILIRUB SERPL-MCNC: 0.7 MG/DL (ref 0–1.2)
BUN SERPL-MCNC: 8 MG/DL (ref 8–23)
CALCIUM SERPL-MCNC: 9.6 MG/DL (ref 8.8–10.2)
CHLORIDE SERPL-SCNC: 103 MMOL/L (ref 98–107)
CO2 SERPL-SCNC: 27 MMOL/L (ref 20–29)
CREAT SERPL-MCNC: 0.66 MG/DL (ref 0.6–1.1)
DIFFERENTIAL METHOD BLD: ABNORMAL
EOSINOPHIL # BLD: 0.14 K/UL (ref 0–0.8)
EOSINOPHIL NFR BLD: 2 % (ref 0.5–7.8)
ERYTHROCYTE [DISTWIDTH] IN BLOOD BY AUTOMATED COUNT: 12.7 % (ref 11.9–14.6)
GLOBULIN SER CALC-MCNC: 2.3 G/DL (ref 2.3–3.5)
GLUCOSE SERPL-MCNC: 91 MG/DL (ref 70–99)
HCT VFR BLD AUTO: 41.1 % (ref 35.8–46.3)
HGB BLD-MCNC: 14.2 G/DL (ref 11.7–15.4)
IMM GRANULOCYTES # BLD AUTO: 0.02 K/UL (ref 0–0.5)
IMM GRANULOCYTES NFR BLD AUTO: 0.3 % (ref 0–5)
LYMPHOCYTES # BLD: 2.01 K/UL (ref 0.5–4.6)
LYMPHOCYTES NFR BLD: 28.4 % (ref 13–44)
MCH RBC QN AUTO: 33.2 PG (ref 26.1–32.9)
MCHC RBC AUTO-ENTMCNC: 34.5 G/DL (ref 31.4–35)
MCV RBC AUTO: 96 FL (ref 82–102)
MONOCYTES # BLD: 0.68 K/UL (ref 0.1–1.3)
MONOCYTES NFR BLD: 9.6 % (ref 4–12)
NEUTS SEG # BLD: 4.2 K/UL (ref 1.7–8.2)
NEUTS SEG NFR BLD: 59.4 % (ref 43–78)
NRBC # BLD: 0 K/UL (ref 0–0.2)
PLATELET # BLD AUTO: 192 K/UL (ref 150–450)
PMV BLD AUTO: 11.2 FL (ref 9.4–12.3)
POTASSIUM SERPL-SCNC: 4.3 MMOL/L (ref 3.5–5.1)
PROT SERPL-MCNC: 6.3 G/DL (ref 6.3–8.2)
RBC # BLD AUTO: 4.28 M/UL (ref 4.05–5.2)
SODIUM SERPL-SCNC: 140 MMOL/L (ref 136–145)
TSH, 3RD GENERATION: 0.66 UIU/ML (ref 0.27–4.2)
WBC # BLD AUTO: 7.1 K/UL (ref 4.3–11.1)

## 2025-05-22 PROCEDURE — 99214 OFFICE O/P EST MOD 30 MIN: CPT | Performed by: FAMILY MEDICINE

## 2025-05-22 PROCEDURE — G2211 COMPLEX E/M VISIT ADD ON: HCPCS | Performed by: FAMILY MEDICINE

## 2025-05-22 RX ORDER — METHOTREXATE 2.5 MG/1
15 TABLET ORAL WEEKLY
COMMUNITY
Start: 2025-05-14

## 2025-05-22 ASSESSMENT — ENCOUNTER SYMPTOMS
SHORTNESS OF BREATH: 0
BLOOD IN STOOL: 0
CHEST TIGHTNESS: 0

## 2025-05-22 NOTE — PROGRESS NOTES
[facility]  _______________________________________  MD Adrianne Dwyer DO Elizabeth King, MD Kati Gray MD    [address] [place] [address]  Phone: [phone]  Fax: [phone]    Iona Salinas (:  1965) is a 60 y.o. female,Established patient, here for evaluation of the following chief complaint(s):  Thyroid Problem      Assessment & Plan   ASSESSMENT/PLAN:    1. Avitaminosis D  Stable, continue current regimen.   Check levels.   - Vitamin D 25 Hydroxy; Future  - Comprehensive Metabolic Panel; Future    2. Gastroesophageal reflux disease without esophagitis  Asked her to try adding hibiscus tea, be careful titrating down if symptoms worsen, FU with GI as planned.   - CBC with Auto Differential; Future    3. Allergic rhinitis, unspecified seasonality, unspecified trigger  Asked her to try nasal rinse or neti pot at home.   - CBC with Auto Differential; Future    4. Acquired hypothyroidism  Stable, continue current regimen.   Check levels.   - TSH; Future    5. MDD (major depressive disorder), recurrent, in partial remission    - TSH; Future          Return in about 6 months (around 2025).      Subjective   SUBJECTIVE/OBJECTIVE:         CRPS: Managed by PMR, has pain stimulator. Trying to get disabled through the state. Sees PMR through [facility].     HLD: Stopped taking lipitor 10.   The 10-year ASCVD risk score (Kwadwo DK, et al., 2019) is: 3.6%    Values used to calculate the score:      Age: 60 years      Sex: Female      Is Non- : No      Diabetic: No      Tobacco smoker: No      Systolic Blood Pressure: 112 mmHg      Is BP treated: No      HDL Cholesterol: 54 MG/DL      Total Cholesterol: 241 MG/DL    BP Readings from Last 3 Encounters:   25 112/67   25 125/67   25 112/70     Lab Results   Component Value Date/Time     2024 10:25 AM    K 4.2 2024 10:25 AM

## 2025-05-27 ENCOUNTER — RESULTS FOLLOW-UP (OUTPATIENT)
Dept: FAMILY MEDICINE CLINIC | Facility: CLINIC | Age: 60
End: 2025-05-27

## 2025-06-12 ENCOUNTER — PATIENT MESSAGE (OUTPATIENT)
Dept: FAMILY MEDICINE CLINIC | Facility: CLINIC | Age: 60
End: 2025-06-12

## 2025-06-12 DIAGNOSIS — F33.41 MDD (MAJOR DEPRESSIVE DISORDER), RECURRENT, IN PARTIAL REMISSION: ICD-10-CM

## 2025-07-22 ENCOUNTER — OFFICE VISIT (OUTPATIENT)
Dept: OBGYN CLINIC | Age: 60
End: 2025-07-22
Payer: COMMERCIAL

## 2025-07-22 VITALS
HEIGHT: 66 IN | DIASTOLIC BLOOD PRESSURE: 72 MMHG | WEIGHT: 122 LBS | SYSTOLIC BLOOD PRESSURE: 118 MMHG | BODY MASS INDEX: 19.61 KG/M2

## 2025-07-22 DIAGNOSIS — N76.6 SKIN ULCER OF LABIA MAJORA: Primary | ICD-10-CM

## 2025-07-22 DIAGNOSIS — R30.0 DYSURIA: ICD-10-CM

## 2025-07-22 PROCEDURE — 99213 OFFICE O/P EST LOW 20 MIN: CPT | Performed by: OBSTETRICS & GYNECOLOGY

## 2025-07-22 RX ORDER — VALACYCLOVIR HYDROCHLORIDE 1 G/1
1000 TABLET, FILM COATED ORAL 2 TIMES DAILY
Qty: 6 TABLET | Refills: 5 | Status: SHIPPED | OUTPATIENT
Start: 2025-07-22 | End: 2025-07-25

## 2025-07-22 RX ORDER — ACYCLOVIR 50 MG/G
OINTMENT TOPICAL
Qty: 15 G | Refills: 5 | Status: SHIPPED | OUTPATIENT
Start: 2025-07-22

## 2025-07-22 NOTE — PROGRESS NOTES
Iona Salinas presents with a new onset problem of painful \"pimples\" on her labia.  Started a few days ago.  No h/o HSV (oral or genital).  Monogamous for a long time, although had stopped using vERT and thus vaginal intercourse.  Her  has not had any problems or lesions. Recently they have started engaging in sexual activity but not penetration.      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    Blood pressure 118/72, height 1.676 m (5' 6\"), weight 55.3 kg (122 lb).  Body mass index is 19.69 kg/m².     A&Ox3.  NAD  NL thought/judgment  Psych - grossly NL, not anxious  Pelvic: External genitalia: multiple bilateral painful small blisters with erythematous bases.  Exam chaperoned by clinical staff.     Iona was seen today for vaginal pain.    Diagnoses and all orders for this visit:    Skin ulcer of labia majora - suspect HSV 1 or 2.  Unsure of source. However with the h/o orbital HSV it is possible this was transmitted during sexual activity.  Culture and Ab obtained, start emperic tx.  -     HSV by SARINA; Future  -     HSV by SARINA  -     HSV 1 and 2 Specific Ab, IgG; Future  -     acyclovir (ZOVIRAX) 5 % ointment; Apply topically 5 times daily.  -     valACYclovir (VALTREX) 1 g tablet; Take 1 tablet by mouth 2 times daily for 3 days  -     HSV 1 and 2 Specific Ab, IgG    Dysuria  -     Culture, Urine; Future  -     Cancel: AMB POC URINALYSIS DIP STICK MANUAL W/ MICRO  -     Culture, Urine         Return for Annual when avilable.    Aldair Hernández MD

## 2025-07-23 ENCOUNTER — TELEPHONE (OUTPATIENT)
Dept: OBGYN CLINIC | Age: 60
End: 2025-07-23

## 2025-07-23 LAB
HSV1 DNA SPEC QL NAA+PROBE: POSITIVE
HSV2 DNA SPEC QL NAA+PROBE: NEGATIVE
SPECIMEN SOURCE: ABNORMAL

## 2025-07-23 NOTE — TELEPHONE ENCOUNTER
Pharmacy states that the acyclovir ointment and valtrex tablets both contain polyethylene glycol, which is also in the covid vaccine, which the pt is very allergic to, so the pt avoids that ingredient to be safe. Alternative medication requested. Acyclovir tablets recommended as alternative. MD consulted.

## 2025-07-24 DIAGNOSIS — N76.6 SKIN ULCER OF LABIA MAJORA: Primary | ICD-10-CM

## 2025-07-24 LAB
BACTERIA SPEC CULT: NORMAL
HSV1 IGG SER IA-ACNC: NON REACTIVE
HSV2 IGG SER IA-ACNC: NON REACTIVE
SERVICE CMNT-IMP: NORMAL

## 2025-07-24 RX ORDER — ACYCLOVIR 800 MG/1
800 TABLET ORAL 3 TIMES DAILY
Qty: 15 TABLET | Refills: 5 | Status: SHIPPED | OUTPATIENT
Start: 2025-07-24

## 2025-08-27 ENCOUNTER — OFFICE VISIT (OUTPATIENT)
Dept: OBGYN CLINIC | Age: 60
End: 2025-08-27
Payer: COMMERCIAL

## 2025-08-27 VITALS
BODY MASS INDEX: 19.93 KG/M2 | WEIGHT: 124 LBS | SYSTOLIC BLOOD PRESSURE: 120 MMHG | HEIGHT: 66 IN | DIASTOLIC BLOOD PRESSURE: 80 MMHG

## 2025-08-27 DIAGNOSIS — Z12.31 BREAST CANCER SCREENING BY MAMMOGRAM: ICD-10-CM

## 2025-08-27 DIAGNOSIS — A60.04 HERPES SIMPLEX VULVOVAGINITIS: ICD-10-CM

## 2025-08-27 DIAGNOSIS — N95.2 ATROPHIC VAGINITIS: ICD-10-CM

## 2025-08-27 DIAGNOSIS — Z01.419 WELL WOMAN EXAM WITH ROUTINE GYNECOLOGICAL EXAM: Primary | ICD-10-CM

## 2025-08-27 DIAGNOSIS — Z12.4 CERVICAL CANCER SCREENING: ICD-10-CM

## 2025-08-27 PROCEDURE — 99396 PREV VISIT EST AGE 40-64: CPT | Performed by: OBSTETRICS & GYNECOLOGY

## 2025-08-27 PROCEDURE — 99459 PELVIC EXAMINATION: CPT | Performed by: OBSTETRICS & GYNECOLOGY

## 2025-08-27 RX ORDER — ESTRADIOL 0.1 MG/G
1 CREAM VAGINAL
Qty: 42 G | Refills: 5 | Status: SHIPPED | OUTPATIENT
Start: 2025-08-27

## 2025-08-27 RX ORDER — VALACYCLOVIR HYDROCHLORIDE 500 MG/1
500 TABLET, FILM COATED ORAL 2 TIMES DAILY
Qty: 10 TABLET | Refills: 5 | Status: SHIPPED | OUTPATIENT
Start: 2025-08-27 | End: 2025-09-01

## 2025-08-27 ASSESSMENT — ENCOUNTER SYMPTOMS
EYES NEGATIVE: 1
BLOOD IN STOOL: 0
RESPIRATORY NEGATIVE: 1
ALLERGIC/IMMUNOLOGIC NEGATIVE: 1
ABDOMINAL PAIN: 0
GASTROINTESTINAL NEGATIVE: 1
SHORTNESS OF BREATH: 0
COUGH: 0

## 2025-08-30 LAB
COLLECTION METHOD: NORMAL
CYTOLOGIST CVX/VAG CYTO: NORMAL
CYTOLOGY CVX/VAG DOC THIN PREP: NORMAL
HPV REFLEX: NORMAL
Lab: NORMAL
OTHER PT INFO: NORMAL
PAP SOURCE: NORMAL
PATH REPORT.FINAL DX SPEC: NORMAL
PREV CYTO INFO: NEGATIVE
PREV TREATMENT: NORMAL
STAT OF ADQ CVX/VAG CYTO-IMP: NORMAL

## (undated) DEVICE — 3M™ TEGADERM™ TRANSPARENT FILM DRESSING FRAME STYLE, 1626W, 4 IN X 4-3/4 IN (10 CM X 12 CM), 50/CT 4CT/CASE: Brand: 3M™ TEGADERM™

## (undated) DEVICE — DRILL BIT

## (undated) DEVICE — SUTURE VCRL SZ 2-0 L27IN ABSRB UD L26MM CT-2 1/2 CIR J269H

## (undated) DEVICE — CONNECTOR TBNG OD5-7MM O2 END DISP

## (undated) DEVICE — SUTURE VCRL SZ 0 L27IN ABSRB UD L26MM CT-2 1/2 CIR J270H

## (undated) DEVICE — MASTISOL ADHESIVE LIQ 2/3ML

## (undated) DEVICE — DRSG GZ OIL EMUL CURAD 3X8 --

## (undated) DEVICE — SYR 3ML LL TIP 1/10ML GRAD --

## (undated) DEVICE — NDL PRT INJ NSAF BLNT 18GX1.5 --

## (undated) DEVICE — REM POLYHESIVE ADULT PATIENT RETURN ELECTRODE: Brand: VALLEYLAB

## (undated) DEVICE — GOWN,REINF,POLY,ECL,PP SLV,XL: Brand: MEDLINE

## (undated) DEVICE — PRECISION THIN, OFFSET (5.5 X 0.38 X 25.0MM)

## (undated) DEVICE — LAP CHOLE: Brand: MEDLINE INDUSTRIES, INC.

## (undated) DEVICE — GUIDEWIRE ORTH DIA1.4MM FOR MAXTORQUE CANN SCR SYS

## (undated) DEVICE — CARDINAL HEALTH FLEXIBLE LIGHT HANDLE COVER: Brand: CARDINAL HEALTH

## (undated) DEVICE — KENDALL RADIOLUCENT FOAM MONITORING ELECTRODE RECTANGULAR SHAPE: Brand: KENDALL

## (undated) DEVICE — PREP SKN CHLRAPRP APL 26ML STR --

## (undated) DEVICE — JOINT PREP INSTRUMENT KIT: Brand: ORTHOLOC™ 2

## (undated) DEVICE — SOL ANTI-FOG 6ML MEDC -- MEDICHOICE - CONVERT TO 358427

## (undated) DEVICE — SUTURE MCRYL SZ 3-0 L27IN ABSRB UD L19MM PS-2 3/8 CIR PRIM Y427H

## (undated) DEVICE — MEDI-VAC YANKAUER SUCTION HANDLE W/BULBOUS TIP: Brand: CARDINAL HEALTH

## (undated) DEVICE — BANDAGE,GAUZE,CONFORMING,2"X75",STRL,LF: Brand: MEDLINE INDUSTRIES, INC.

## (undated) DEVICE — 2000CC GUARDIAN II: Brand: GUARDIAN

## (undated) DEVICE — BAG SPEC RETRV 275ML 10ML DISPOSABLE RELIACATCH

## (undated) DEVICE — BNDG ELAS ESMARK 4INX12FT LF -- STRL

## (undated) DEVICE — (D)STRIP SKN CLSR 0.5X4IN WHT --

## (undated) DEVICE — SYR LR LCK 1ML GRAD NSAF 30ML --

## (undated) DEVICE — SHEARS ENDOSCP L36CM DIA5MM ULTRASONIC CRV TIP W/ ADV

## (undated) DEVICE — SUTURE VCRL SZ 3-0 L18IN ABSRB UD L26MM SH 1/2 CIR J864D

## (undated) DEVICE — KENDALL SCD EXPRESS SLEEVES, KNEE LENGTH, MEDIUM: Brand: KENDALL SCD

## (undated) DEVICE — [HIGH FLOW INSUFFLATOR,  DO NOT USE IF PACKAGE IS DAMAGED,  KEEP DRY,  KEEP AWAY FROM SUNLIGHT,  PROTECT FROM HEAT AND RADIOACTIVE SOURCES.]: Brand: PNEUMOSURE

## (undated) DEVICE — NEEDLE HYPO 21GA L1.5IN INTRAMUSCULAR S STL LATCH BVL UP

## (undated) DEVICE — APPLIER CLP M/L SHFT DIA5MM 15 LIG LIGAMAX 5

## (undated) DEVICE — BUTTON SWITCH PENCIL BLADE ELECTRODE, HOLSTER: Brand: EDGE

## (undated) DEVICE — AMD ANTIMICROBIAL GAUZE SPONGES,12 PLY USP TYPE VII, 0.2% POLYHEXAMETHYLENE BIGUANIDE HCI (PHMB): Brand: CURITY

## (undated) DEVICE — PADDING CAST W2INXL4YD ST COT COHESIVE HND TEARABLE SPEC

## (undated) DEVICE — FOOT & ANKLE SOFT DR WOMACK: Brand: MEDLINE INDUSTRIES, INC.

## (undated) DEVICE — SUTURE VCRL SZ 4-0 L27IN ABSRB UD L19MM PS-2 3/8 CIR PRIM J426H

## (undated) DEVICE — SUTURE VCRL SZ 4-0 L18IN ABSRB UD L19MM PS-2 3/8 CIR PRIM J496H

## (undated) DEVICE — ZIMMER® STERILE DISPOSABLE TOURNIQUET CUFF WITH PLC, DUAL PORT, SINGLE BLADDER, 18 IN. (46 CM)

## (undated) DEVICE — SOLUTION IV 1000ML 0.9% SOD CHL

## (undated) DEVICE — DRAPE C ARM W54XL84IN MINI FOR OEC 6800

## (undated) DEVICE — INTENDED FOR TISSUE SEPARATION, AND OTHER PROCEDURES THAT REQUIRE A SHARP SURGICAL BLADE TO PUNCTURE OR CUT.: Brand: BARD-PARKER ® STAINLESS STEEL BLADES

## (undated) DEVICE — SURGICAL PROCEDURE PACK BASIC ST FRANCIS

## (undated) DEVICE — CANNULA NSL ORAL AD FOR CAPNOFLEX CO2 O2 AIRLFE

## (undated) DEVICE — Device

## (undated) DEVICE — SOLUTION IRRIG 1000ML 09% SOD CHL USP PIC PLAS CONTAINER

## (undated) DEVICE — SYR 5ML 1/5 GRAD LL NSAF LF --

## (undated) DEVICE — AMD ANTIMICROBIAL NON-ADHERENT PAD,0.2% POLYHEXAMETHYLENE BIGUANIDE HCI (PHMB): Brand: TELFA

## (undated) DEVICE — (D)PREP SKN CHLRAPRP APPL 26ML -- CONVERT TO ITEM 371833

## (undated) DEVICE — SUTURE ETHLN SZ 3-0 L18IN NONABSORBABLE BLK FS-1 L24MM 3/8 663H

## (undated) DEVICE — BNDG ELAS COBAN 2INX5YD NS --

## (undated) DEVICE — SYR 50ML SLIP TIP NSAF LF STRL --

## (undated) DEVICE — SUT ETHLN 3-0 18IN PS1 BLK --

## (undated) DEVICE — CONTAINER SPEC FRMLN 120ML --

## (undated) DEVICE — SHEET, T, LAPAROTOMY, STERILE: Brand: MEDLINE